# Patient Record
Sex: MALE | Race: WHITE | NOT HISPANIC OR LATINO | Employment: OTHER | URBAN - METROPOLITAN AREA
[De-identification: names, ages, dates, MRNs, and addresses within clinical notes are randomized per-mention and may not be internally consistent; named-entity substitution may affect disease eponyms.]

---

## 2017-06-13 ENCOUNTER — TELEPHONE (OUTPATIENT)
Dept: CARDIOLOGY | Facility: CLINIC | Age: 67
End: 2017-06-13

## 2017-06-13 NOTE — TELEPHONE ENCOUNTER
Called and left a message for patient, no need to hold Eliquis prior to dental procedure but could hold for one day if prefers to per Dr. Ariza's instruction/ CHAUNCEY

## 2017-06-13 NOTE — TELEPHONE ENCOUNTER
Patient is scheduled for a tooth extraction tomorrow.  He asks when he should hold Eliquis.  Please advise. / CHAUNCEY

## 2017-09-14 RX ORDER — APIXABAN 5 MG/1
TABLET, FILM COATED ORAL
Qty: 60 TABLET | Refills: 11 | Status: SHIPPED | OUTPATIENT
Start: 2017-09-14 | End: 2017-09-28 | Stop reason: SDUPTHER

## 2017-09-20 RX ORDER — DIGOXIN 250 MCG
TABLET ORAL
Qty: 30 TABLET | Refills: 0 | Status: SHIPPED | OUTPATIENT
Start: 2017-09-20 | End: 2017-09-28 | Stop reason: SDUPTHER

## 2017-09-20 RX ORDER — SOTALOL HYDROCHLORIDE 80 MG/1
TABLET ORAL
Qty: 60 TABLET | Refills: 0 | Status: SHIPPED | OUTPATIENT
Start: 2017-09-20 | End: 2017-10-02 | Stop reason: SINTOL

## 2017-09-20 RX ORDER — LISINOPRIL 2.5 MG/1
TABLET ORAL
Qty: 30 TABLET | Refills: 0 | Status: SHIPPED | OUTPATIENT
Start: 2017-09-20 | End: 2017-09-28 | Stop reason: SDUPTHER

## 2017-09-28 ENCOUNTER — OFFICE VISIT (OUTPATIENT)
Dept: CARDIOLOGY | Facility: CLINIC | Age: 67
End: 2017-09-28

## 2017-09-28 VITALS
WEIGHT: 198 LBS | SYSTOLIC BLOOD PRESSURE: 134 MMHG | HEART RATE: 86 BPM | DIASTOLIC BLOOD PRESSURE: 92 MMHG | BODY MASS INDEX: 30.01 KG/M2 | HEIGHT: 68 IN

## 2017-09-28 DIAGNOSIS — I48.20 CHRONIC ATRIAL FIBRILLATION (HCC): Primary | ICD-10-CM

## 2017-09-28 DIAGNOSIS — I42.9 CARDIOMYOPATHY (HCC): ICD-10-CM

## 2017-09-28 DIAGNOSIS — I10 HTN (HYPERTENSION) WITH GOAL TO BE DETERMINED: ICD-10-CM

## 2017-09-28 PROCEDURE — 99214 OFFICE O/P EST MOD 30 MIN: CPT | Performed by: INTERNAL MEDICINE

## 2017-09-28 PROCEDURE — 93000 ELECTROCARDIOGRAM COMPLETE: CPT | Performed by: INTERNAL MEDICINE

## 2017-09-28 RX ORDER — DIGOXIN 250 MCG
250 TABLET ORAL
Qty: 90 TABLET | Refills: 3 | Status: SHIPPED | OUTPATIENT
Start: 2017-09-28 | End: 2018-09-23 | Stop reason: SDUPTHER

## 2017-09-28 RX ORDER — SOTALOL HYDROCHLORIDE 80 MG/1
80 TABLET ORAL 2 TIMES DAILY
Qty: 180 TABLET | Refills: 3 | Status: CANCELLED | OUTPATIENT
Start: 2017-09-28

## 2017-09-28 RX ORDER — LISINOPRIL 2.5 MG/1
2.5 TABLET ORAL DAILY
Qty: 90 TABLET | Refills: 0 | Status: SHIPPED | OUTPATIENT
Start: 2017-09-28 | End: 2018-01-04 | Stop reason: SDUPTHER

## 2017-09-28 NOTE — PROGRESS NOTES
Date of Office Visit: 17  Encounter Provider: Monty Ariza MD  Place of Service: [facility] CARDIOLOGY  Patient Name: Jered Morejon  :1950      Chief Complaint   Patient presents with   • Cardiomyopathy   • Atrial Fibrillation     History of Present Illness  HPI Comments: Mr. Morejon is a 66-year-old gentleman with a history of atrial fibrillation.  He said he's had this off and on since he was age 20.  In  he presented to the hospital in atrial fibrillation with shortness of breath.  At that time he had cardiac catheterization that was unremarkable except had a left ventricular ejection fraction of 30%.  His coronaries were normal.  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  In regards to his atrial fibrillation he had a transesophageal echocardiogram and then had cardioversion.  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  He subsequently did well.    He now comes in for follow-up.  He denies chest pain and pressure.  Denies any shortness breath, orthopnea, or PND.  Denies any stroke type symptoms.  Denies any bleeding or blood in his stool or black tarry stools he does notice occasional heart fluttering.  No near-syncope or syncope.  And 40 still drinking 4-5 beers a day.    Cardiomyopathy   Pertinent negatives include no abdominal pain, congestion, diaphoresis, fever, headaches, joint swelling, myalgias, nausea, numbness, rash, vertigo, vomiting or weakness.   Atrial Fibrillation   Symptoms are negative for dizziness and weakness. Past medical history includes atrial fibrillation.         Past Medical History:   Diagnosis Date   • Atrial fibrillation    • Hypertension    • Renal infarct          Past Surgical History:   Procedure Laterality Date   • CARDIAC CATHETERIZATION     • CARDIOVERSION           Current Outpatient Prescriptions on File Prior to Visit   Medication Sig Dispense Refill   • buPROPion XL (WELLBUTRIN XL) 300 MG 24 hr tablet  Take 300 mg by mouth daily.       • fluticasone (FLONASE) 50 MCG/ACT nasal spray 1 spray into each nostril daily.       • sotalol (BETAPACE) 80 MG tablet TAKE ONE TABLET BY MOUTH TWICE DAILY 60 tablet 0   • [DISCONTINUED] digoxin (LANOXIN) 250 MCG tablet TAKE ONE TABLET BY MOUTH ONCE DAILY 30 tablet 0   • [DISCONTINUED] ELIQUIS 5 MG tablet tablet TAKE ONE TABLET BY MOUTH TWICE DAILY 60 tablet 11   • [DISCONTINUED] lisinopril (PRINIVIL,ZESTRIL) 2.5 MG tablet TAKE ONE TABLET BY MOUTH ONCE DAILY 30 tablet 0     No current facility-administered medications on file prior to visit.          Social History     Social History   • Marital status: Single     Spouse name: N/A   • Number of children: N/A   • Years of education: N/A     Occupational History   • Not on file.     Social History Main Topics   • Smoking status: Former Smoker     Packs/day: 2.00     Years: 45.00     Quit date: 1/19/2012   • Smokeless tobacco: Not on file   • Alcohol use Yes      Comment: 3 drinks a day   • Drug use: No   • Sexual activity: Not on file     Other Topics Concern   • Not on file     Social History Narrative       Family History   Problem Relation Age of Onset   • No Known Problems Mother    • No Known Problems Father    • No Known Problems Sister    • No Known Problems Brother    • No Known Problems Maternal Aunt    • No Known Problems Maternal Uncle    • No Known Problems Paternal Aunt    • No Known Problems Paternal Uncle    • No Known Problems Maternal Grandmother    • No Known Problems Maternal Grandfather    • No Known Problems Paternal Grandmother    • No Known Problems Paternal Grandfather          Review of Systems   Constitution: Negative for decreased appetite, diaphoresis, fever, weakness, malaise/fatigue, weight gain and weight loss.   HENT: Negative for congestion, hearing loss, nosebleeds and tinnitus.    Eyes: Negative for blurred vision, double vision, vision loss in left eye, vision loss in right eye and visual  "disturbance.   Cardiovascular:        As noted in HPI   Respiratory:        As noted HPI   Endocrine: Negative for cold intolerance and heat intolerance.   Hematologic/Lymphatic: Negative for bleeding problem. Does not bruise/bleed easily.   Skin: Negative for color change, flushing, itching and rash.   Musculoskeletal: Negative for arthritis, back pain, joint pain, joint swelling, muscle weakness and myalgias.   Gastrointestinal: Negative for bloating, abdominal pain, constipation, diarrhea, dysphagia, heartburn, hematemesis, hematochezia, melena, nausea and vomiting.   Genitourinary: Negative for bladder incontinence, dysuria, frequency, nocturia and urgency.   Neurological: Negative for dizziness, focal weakness, headaches, light-headedness, loss of balance, numbness, paresthesias and vertigo.   Psychiatric/Behavioral: Negative for depression, memory loss and substance abuse.       Procedures      ECG 12 Lead  Date/Time: 9/28/2017 3:30 PM  Performed by: LEISA PENG  Authorized by: LEISA PENG   Comparison: compared with previous ECG   Similar to previous ECG  Rhythm: atrial fibrillation  Rate: normal  Conduction: right bundle branch block  QRS axis: indeterminate                 Objective:    /92 (BP Location: Left arm)  Pulse 86  Ht 68\" (172.7 cm)  Wt 198 lb (89.8 kg)  BMI 30.11 kg/m2       Physical Exam  Physical Exam   Constitutional: He is oriented to person, place, and time. He appears well-developed and well-nourished. No distress.   HENT:   Head: Normocephalic.   Eyes: Conjunctivae are normal. Pupils are equal, round, and reactive to light. No scleral icterus.   Neck: Normal carotid pulses, no hepatojugular reflux and no JVD present. Carotid bruit is not present. No tracheal deviation, no edema and no erythema present. No thyromegaly present.   Cardiovascular: Normal rate, S1 normal, S2 normal, normal heart sounds and intact distal pulses.  An irregularly irregular rhythm present.  No " extrasystoles are present. PMI is not displaced.  Exam reveals no gallop, no distant heart sounds and no friction rub.    No murmur heard.  Pulses:       Carotid pulses are 2+ on the right side, and 2+ on the left side.       Radial pulses are 2+ on the right side, and 2+ on the left side.        Femoral pulses are 2+ on the right side, and 2+ on the left side.       Dorsalis pedis pulses are 2+ on the right side, and 2+ on the left side.        Posterior tibial pulses are 2+ on the right side, and 2+ on the left side.   Pulmonary/Chest: Effort normal and breath sounds normal. No respiratory distress. He has no decreased breath sounds. He has no wheezes. He has no rhonchi. He has no rales. He exhibits no tenderness.   Abdominal: Soft. Bowel sounds are normal. He exhibits no distension and no mass. There is no hepatosplenomegaly. There is no tenderness. There is no rebound and no guarding.   Musculoskeletal: He exhibits no edema, tenderness or deformity.   Neurological: He is alert and oriented to person, place, and time.   Skin: Skin is warm and dry. No rash noted. He is not diaphoretic. No cyanosis or erythema. No pallor. Nails show no clubbing.   Psychiatric: He has a normal mood and affect. His speech is normal and behavior is normal. Judgment and thought content normal.           Assessment:   1. 66-year-old gentleman with chronic atrial fibrillation.  Atrial Fibrillation and Atrial Flutter  Assessment  • The patient has permanent atrial fibrillation  • This is non-valvular in etiology  • The patient's CHADS2-VASc score is 2  • A [id number] score of 2 or more is considered a high risk for a thromboembolic event  • Apixaban prescribed    Plan  • Continue in atrial fibrillation with rate control  • Continue apixaban for antithrombotic therapy, bleeding issues discussed  • Continue beta blocker and digoxin for rate control  He is still drinking a lot which is a big concern We've asked him to cut that back or stop  altogether.  In addition his QTC is prolonged could be from the sotalol he's not staying in sinus rhythm were going to switch him to metoprolol.  We'll continue the same see me in follow-up in a year.     2. Cardiomyopathy. Follow-up echocardiogram in January 2016 left ventricular ejection fraction to return to normal at 57% no significant valvular disease. This most likely was arrhythmia induced. He is New York Heart Association class I continue the same.  3. Hypertension. Blood pressure adequately controlled.  4. Alcohol abuse is described.  4. Remote history of renal infarct he says after cardioversion           Plan:

## 2017-10-02 RX ORDER — SOTALOL HYDROCHLORIDE 80 MG/1
TABLET ORAL
Qty: 180 TABLET | Refills: 3 | OUTPATIENT
Start: 2017-10-02

## 2018-01-04 RX ORDER — LISINOPRIL 2.5 MG/1
2.5 TABLET ORAL DAILY
Qty: 90 TABLET | Refills: 1 | Status: SHIPPED | OUTPATIENT
Start: 2018-01-04 | End: 2018-01-28 | Stop reason: SDUPTHER

## 2018-01-29 RX ORDER — LISINOPRIL 2.5 MG/1
TABLET ORAL
Qty: 90 TABLET | Refills: 1 | Status: SHIPPED | OUTPATIENT
Start: 2018-01-29 | End: 2018-08-01 | Stop reason: SDUPTHER

## 2018-08-02 RX ORDER — LISINOPRIL 2.5 MG/1
TABLET ORAL
Qty: 90 TABLET | Refills: 1 | Status: SHIPPED | OUTPATIENT
Start: 2018-08-02 | End: 2019-03-01 | Stop reason: SDUPTHER

## 2018-09-23 ENCOUNTER — TELEPHONE (OUTPATIENT)
Dept: CARDIOLOGY | Facility: CLINIC | Age: 68
End: 2018-09-23

## 2018-09-23 DIAGNOSIS — I48.91 ATRIAL FIBRILLATION, UNSPECIFIED TYPE (HCC): Primary | ICD-10-CM

## 2018-09-24 RX ORDER — DIGOXIN 250 UG/1
TABLET ORAL
Qty: 90 TABLET | Refills: 0 | Status: SHIPPED | OUTPATIENT
Start: 2018-09-24 | End: 2018-09-24 | Stop reason: SDUPTHER

## 2018-09-24 RX ORDER — DIGOXIN 250 MCG
250 TABLET ORAL DAILY
Qty: 30 TABLET | Refills: 0 | Status: SHIPPED | OUTPATIENT
Start: 2018-09-24 | End: 2018-12-24 | Stop reason: SDUPTHER

## 2018-10-01 RX ORDER — APIXABAN 5 MG/1
TABLET, FILM COATED ORAL
Qty: 60 TABLET | Refills: 0 | Status: SHIPPED | OUTPATIENT
Start: 2018-10-01 | End: 2019-02-04 | Stop reason: HOSPADM

## 2018-10-09 ENCOUNTER — TELEPHONE (OUTPATIENT)
Dept: CARDIOLOGY | Facility: CLINIC | Age: 68
End: 2018-10-09

## 2018-10-23 ENCOUNTER — LAB (OUTPATIENT)
Dept: LAB | Facility: HOSPITAL | Age: 68
End: 2018-10-23

## 2018-10-23 DIAGNOSIS — I48.91 ATRIAL FIBRILLATION, UNSPECIFIED TYPE (HCC): ICD-10-CM

## 2018-10-23 LAB — DIGOXIN SERPL-MCNC: 1.2 NG/ML (ref 0.6–1.2)

## 2018-10-23 PROCEDURE — 80162 ASSAY OF DIGOXIN TOTAL: CPT

## 2018-10-23 PROCEDURE — 36415 COLL VENOUS BLD VENIPUNCTURE: CPT

## 2018-10-26 ENCOUNTER — OFFICE VISIT (OUTPATIENT)
Dept: CARDIOLOGY | Facility: CLINIC | Age: 68
End: 2018-10-26

## 2018-10-26 VITALS
BODY MASS INDEX: 30.31 KG/M2 | WEIGHT: 200 LBS | SYSTOLIC BLOOD PRESSURE: 132 MMHG | HEART RATE: 77 BPM | DIASTOLIC BLOOD PRESSURE: 76 MMHG | HEIGHT: 68 IN

## 2018-10-26 DIAGNOSIS — I42.8 OTHER CARDIOMYOPATHY (HCC): ICD-10-CM

## 2018-10-26 DIAGNOSIS — I10 HTN (HYPERTENSION) WITH GOAL TO BE DETERMINED: ICD-10-CM

## 2018-10-26 DIAGNOSIS — I48.20 CHRONIC ATRIAL FIBRILLATION (HCC): Primary | ICD-10-CM

## 2018-10-26 PROCEDURE — 99214 OFFICE O/P EST MOD 30 MIN: CPT | Performed by: INTERNAL MEDICINE

## 2018-10-26 PROCEDURE — 93000 ELECTROCARDIOGRAM COMPLETE: CPT | Performed by: INTERNAL MEDICINE

## 2018-10-26 NOTE — PROGRESS NOTES
Date of Office Visit: 10/26/18  Encounter Provider: Monty Ariza MD  Place of Service: [facility] CARDIOLOGY  Patient Name: Jered Morejon  :1950  Referral Provider:No ref. provider found      Chief Complaint   Patient presents with   • Atrial Fibrillation   • Hypertension     History of Present Illness  Mr. Morejon is a 67-year-old gentleman with a history of atrial fibrillation.  He said he's had this off and on since he was age 20.  In  he presented to the hospital in atrial fibrillation with shortness of breath.  At that time he had cardiac catheterization that was unremarkable except had a left ventricular ejection fraction of 30%.  His coronaries were normal.  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  In regards to his atrial fibrillation he had a transesophageal echocardiogram and then had cardioversion.  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  He subsequently did well.    He now comes in for follow-up. The patient denies chest pain, pressure and heaviness. No shortness of breath, othopnea or PND. No palpitations, near syncope or syncope. No stroke type symptoms like paralysis, paresthesia, abrupt vision loss and dysarthria. No bleeding like blood in the stool or dark stools.  Comes in for follow-up and unfortunately is been having this pain in his back.  He's really complaining of this muscle weakness in his legs and also some tingling in his legs and is convinced it's from the eliquis.  He still drinking about 6 beers a day.      Cardiomyopathy   Pertinent negatives include no abdominal pain, congestion, diaphoresis, fever, headaches, joint swelling, myalgias, nausea, numbness, rash, vertigo, vomiting or weakness.   Atrial Fibrillation   Symptoms are negative for dizziness and weakness. Past medical history includes atrial fibrillation.         Past Medical History:   Diagnosis Date   • Atrial fibrillation (CMS/HCC)    • Hypertension    •  Renal infarct (CMS/HCC)          Past Surgical History:   Procedure Laterality Date   • CARDIAC CATHETERIZATION     • CARDIOVERSION           Current Outpatient Prescriptions on File Prior to Visit   Medication Sig Dispense Refill   • buPROPion XL (WELLBUTRIN XL) 300 MG 24 hr tablet Take 300 mg by mouth daily.       • digoxin (DIGOX) 250 MCG tablet Take 1 tablet by mouth Daily. 30 tablet 0   • ELIQUIS 5 MG tablet tablet TAKE 1 TABLET BY MOUTH TWICE DAILY 60 tablet 0   • fluticasone (FLONASE) 50 MCG/ACT nasal spray 1 spray into each nostril daily.       • lisinopril (PRINIVIL,ZESTRIL) 2.5 MG tablet TAKE 1 TABLET BY MOUTH EVERY DAY 90 tablet 1   • metoprolol tartrate (LOPRESSOR) 25 MG tablet Take 1 tablet by mouth 2 (Two) Times a Day. 180 tablet 3     No current facility-administered medications on file prior to visit.          Social History     Social History   • Marital status: Single     Spouse name: N/A   • Number of children: N/A   • Years of education: N/A     Occupational History   • Not on file.     Social History Main Topics   • Smoking status: Former Smoker     Packs/day: 2.00     Years: 45.00     Quit date: 1/19/2012   • Smokeless tobacco: Not on file   • Alcohol use Yes      Comment: 3 drinks a day   • Drug use: No   • Sexual activity: Not on file     Other Topics Concern   • Not on file     Social History Narrative   • No narrative on file       Family History   Problem Relation Age of Onset   • No Known Problems Mother    • No Known Problems Father    • No Known Problems Sister    • No Known Problems Brother    • No Known Problems Maternal Aunt    • No Known Problems Maternal Uncle    • No Known Problems Paternal Aunt    • No Known Problems Paternal Uncle    • No Known Problems Maternal Grandmother    • No Known Problems Maternal Grandfather    • No Known Problems Paternal Grandmother    • No Known Problems Paternal Grandfather          Review of Systems   Constitution: Negative for decreased appetite,  "diaphoresis, fever, weakness, malaise/fatigue, weight gain and weight loss.   HENT: Negative for congestion, hearing loss, nosebleeds and tinnitus.    Eyes: Negative for blurred vision, double vision, vision loss in left eye, vision loss in right eye and visual disturbance.   Cardiovascular:        As noted in HPI   Respiratory:        As noted HPI   Endocrine: Negative for cold intolerance and heat intolerance.   Hematologic/Lymphatic: Negative for bleeding problem. Does not bruise/bleed easily.   Skin: Negative for color change, flushing, itching and rash.   Musculoskeletal: Positive for back pain and muscle weakness. Negative for arthritis, joint pain, joint swelling and myalgias.   Gastrointestinal: Negative for bloating, abdominal pain, constipation, diarrhea, dysphagia, heartburn, hematemesis, hematochezia, melena, nausea and vomiting.   Genitourinary: Negative for bladder incontinence, dysuria, frequency, nocturia and urgency.   Neurological: Positive for paresthesias. Negative for dizziness, focal weakness, headaches, light-headedness, loss of balance, numbness and vertigo.   Psychiatric/Behavioral: Negative for depression, memory loss and substance abuse.       Procedures      ECG 12 Lead  Date/Time: 10/26/2018 2:42 PM  Performed by: LEISA PENG  Authorized by: LEISA PENG   Comparison: compared with previous ECG   Similar to previous ECG  Rate: normal  Conduction: right bundle branch block  QRS axis: normal                  Objective:    /76 (BP Location: Right arm, Patient Position: Sitting)   Pulse 77   Ht 172.7 cm (68\")   Wt 90.7 kg (200 lb)   BMI 30.41 kg/m²        Physical Exam  Physical Exam   Constitutional: He is oriented to person, place, and time. He appears well-developed and well-nourished. No distress.   HENT:   Head: Normocephalic.   Eyes: Pupils are equal, round, and reactive to light. Conjunctivae are normal. No scleral icterus.   Neck: Normal carotid pulses, no " hepatojugular reflux and no JVD present. Carotid bruit is not present. No tracheal deviation, no edema and no erythema present. No thyromegaly present.   Cardiovascular: Normal rate, S1 normal, S2 normal, normal heart sounds and intact distal pulses.  An irregularly irregular rhythm present.  No extrasystoles are present. PMI is not displaced.  Exam reveals no gallop, no distant heart sounds and no friction rub.    No murmur heard.  Pulses:       Carotid pulses are 2+ on the right side, and 2+ on the left side.       Radial pulses are 2+ on the right side, and 2+ on the left side.        Femoral pulses are 2+ on the right side, and 2+ on the left side.       Dorsalis pedis pulses are 2+ on the right side, and 2+ on the left side.        Posterior tibial pulses are 2+ on the right side, and 2+ on the left side.   Pulmonary/Chest: Effort normal and breath sounds normal. No respiratory distress. He has no decreased breath sounds. He has no wheezes. He has no rhonchi. He has no rales. He exhibits no tenderness.   Abdominal: Soft. Bowel sounds are normal. He exhibits no distension and no mass. There is no hepatosplenomegaly. There is no tenderness. There is no rebound and no guarding.   Musculoskeletal: He exhibits no edema, tenderness or deformity.   Neurological: He is alert and oriented to person, place, and time.   Skin: Skin is warm and dry. No rash noted. He is not diaphoretic. No cyanosis or erythema. No pallor. Nails show no clubbing.   Psychiatric: He has a normal mood and affect. His speech is normal and behavior is normal. Judgment and thought content normal.           Assessment:   1. 67-year-old gentleman with chronic atrial fibrillation.  Atrial Fibrillation and Atrial Flutter  Assessment  • The patient has permanent atrial fibrillation  • This is non-valvular in etiology  • The patient's CHADS2-VASc score is 2  • A [id number] score of 2 or more is considered a high risk for a thromboembolic event  •  Apixaban prescribed    Plan  • Continue in atrial fibrillation with rate control  • Continue apixaban for antithrombotic therapy, bleeding issues discussed  • Continue beta blocker and digoxin for rate control  He's convinced his side effects are from eliquis I'm convinced there from his back.  He is going to stop the eliquis start Xarelto 20 mg a day on Sunday he's to call us in the next week or 2 let us know his symptoms are if they've not resolved he should go back on the eliquis.  There is a chance his symptoms are related to his alcohol.  He will see our nurse practitioner in 4 months we'll see me in a year and call if problems.     2. Cardiomyopathy. Follow-up echocardiogram in January 2016 left ventricular ejection fraction to return to normal at 57% no significant valvular disease. This most likely was arrhythmia induced. He is New York Heart Association class I continue the same.  3. Hypertension. Blood pressure adequately controlled.  4. Alcohol abuse as described.  4. 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

## 2018-10-29 RX ORDER — APIXABAN 5 MG/1
TABLET, FILM COATED ORAL
Qty: 60 TABLET | Refills: 0 | OUTPATIENT
Start: 2018-10-29

## 2018-12-12 ENCOUNTER — TELEPHONE (OUTPATIENT)
Dept: CARDIOLOGY | Facility: CLINIC | Age: 68
End: 2018-12-12

## 2018-12-24 RX ORDER — DIGOXIN 250 UG/1
TABLET ORAL
Qty: 90 TABLET | Refills: 0 | Status: SHIPPED | OUTPATIENT
Start: 2018-12-24 | End: 2019-04-01 | Stop reason: SDUPTHER

## 2019-03-01 ENCOUNTER — OFFICE VISIT (OUTPATIENT)
Dept: CARDIOLOGY | Facility: CLINIC | Age: 69
End: 2019-03-01

## 2019-03-01 VITALS
DIASTOLIC BLOOD PRESSURE: 80 MMHG | HEART RATE: 59 BPM | SYSTOLIC BLOOD PRESSURE: 140 MMHG | BODY MASS INDEX: 31.08 KG/M2 | HEIGHT: 67 IN | WEIGHT: 198 LBS

## 2019-03-01 DIAGNOSIS — N52.9 ERECTILE DYSFUNCTION, UNSPECIFIED ERECTILE DYSFUNCTION TYPE: ICD-10-CM

## 2019-03-01 DIAGNOSIS — Z86.79 HISTORY OF CARDIOMYOPATHY: ICD-10-CM

## 2019-03-01 DIAGNOSIS — Z87.891 HISTORY OF TOBACCO ABUSE: ICD-10-CM

## 2019-03-01 DIAGNOSIS — I42.8 OTHER CARDIOMYOPATHY (HCC): ICD-10-CM

## 2019-03-01 DIAGNOSIS — I10 HTN (HYPERTENSION) WITH GOAL TO BE DETERMINED: ICD-10-CM

## 2019-03-01 DIAGNOSIS — I48.20 CHRONIC ATRIAL FIBRILLATION (HCC): Primary | ICD-10-CM

## 2019-03-01 DIAGNOSIS — Z12.2 ENCOUNTER FOR SCREENING FOR LUNG CANCER: ICD-10-CM

## 2019-03-01 DIAGNOSIS — Z87.891 HISTORY OF NICOTINE DEPENDENCE: ICD-10-CM

## 2019-03-01 PROBLEM — I42.9 MYOCARDIOPATHY: Status: ACTIVE | Noted: 2019-03-01

## 2019-03-01 PROCEDURE — 93000 ELECTROCARDIOGRAM COMPLETE: CPT | Performed by: NURSE PRACTITIONER

## 2019-03-01 PROCEDURE — 99214 OFFICE O/P EST MOD 30 MIN: CPT | Performed by: NURSE PRACTITIONER

## 2019-03-01 RX ORDER — NEBIVOLOL 5 MG/1
5 TABLET ORAL DAILY
Qty: 30 TABLET | Refills: 11 | Status: SHIPPED | OUTPATIENT
Start: 2019-03-01 | End: 2020-03-23

## 2019-03-01 RX ORDER — LISINOPRIL 2.5 MG/1
2.5 TABLET ORAL DAILY
Qty: 90 TABLET | Refills: 3 | Status: SHIPPED | OUTPATIENT
Start: 2019-03-01 | End: 2020-01-24

## 2019-03-01 NOTE — PROGRESS NOTES
Date of Office Visit: 2019  Encounter Provider: TALHA Garsia  Place of Service: [facility] CARDIOLOGY  Patient Name: Jered Morejon  :1950    Chief Complaint   Patient presents with   • Atrial Fibrillation   • Follow-up   :     HPI: Jered Morejon is a 68 y.o. male is a patient of Dr. Ariza.  I am seeing him today for the first time and have reviewed his record.    He has a history of atrial fibrillation.  He has had irregular heart rhythm since the age of 20. He quit smoking in approximately .    In , he presented in atrial fibrillation with shortness of breath.  He had a cardiac catheterization which was unremarkable despite decreased left ventricular systolic function with an ejection fraction of 30%.  The coronary arteries were normal.  There was some question at the cardiomyopathy could be alcohol induced.  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   Follow-up echocardiogram in 2016 showed normal left ventricular systolic function.  The cardiomyopathy was likely arrhythmia induced.  In 2017 he was changed from sotalol to metoprolol tartrate due to QTC prolongation and recurrent A. fib.  He was treated with Eliquis and then complained of muscle weakness and some tingling in the legs which he was convinced was from that.  He was switched to Xarelto 20 mg as he continued to be in atrial fibrillation.    Patient presents today for four-month follow-up.  He believes that the muscle weakness has improved since being on Xarelto.  His main complaint today is erectile dysfunction and having decreased stamina with sexual activity.  He questions if his medication could be contributing to this.  He denies chest pain tightness pressure, palpitation, shortness of breath, near-syncope, or syncope.  He checks his blood pressure at home sparingly which is usually  "130s systolic.  He does have occasional 140 values.  He does not perform any structured exercise.  He continues to drink 3 drinks daily.  He tells me that he did not have the erectile dysfunction when he was on sotalol question can go back to that.  He has never had abdominal aortic aneurysm screening or lung cancer screening with history of tobacco use.      No Known Allergies    Past Medical History:   Diagnosis Date   • Atrial fibrillation (CMS/HCC)    • Hypertension    • Renal infarct (CMS/HCC)        Past Surgical History:   Procedure Laterality Date   • CARDIAC CATHETERIZATION     • CARDIOVERSION           Family and social history reviewed.     Review of Systems   Musculoskeletal: Positive for muscle weakness.   Genitourinary: Positive for decreased libido.        Erectile dysfunction     All other systems were reviewed and are negative          Objective:     Vitals:    03/01/19 1421   BP: 140/80   BP Location: Left arm   Patient Position: Sitting   Pulse: 59   Weight: 89.8 kg (198 lb)   Height: 170.2 cm (67\")     Body mass index is 31.01 kg/m².    PHYSICAL EXAM:  Physical Exam   Constitutional: He is oriented to person, place, and time. He appears well-developed and well-nourished. No distress.   HENT:   Head: Normocephalic.   Eyes: Conjunctivae are normal.   Neck: Normal range of motion. No JVD present.   Cardiovascular: Normal rate, normal heart sounds and intact distal pulses. An irregular rhythm present.   No murmur heard.  Pulses:       Carotid pulses are 2+ on the right side, and 2+ on the left side.       Radial pulses are 2+ on the right side, and 2+ on the left side.        Posterior tibial pulses are 2+ on the right side, and 2+ on the left side.   Pulmonary/Chest: Effort normal and breath sounds normal. No respiratory distress. He has no wheezes. He has no rhonchi. He has no rales. He exhibits no tenderness.   Abdominal: Soft. Bowel sounds are normal. He exhibits no distension.   Musculoskeletal: " Normal range of motion. He exhibits no edema.   Neurological: He is alert and oriented to person, place, and time.   Skin: Skin is warm, dry and intact. No rash noted. He is not diaphoretic. No cyanosis.   Psychiatric: He has a normal mood and affect. His behavior is normal. Judgment and thought content normal.         ECG 12 Lead  Date/Time: 3/1/2019 2:32 PM  Performed by: Carlie Smith APRN  Authorized by: Carlie Smith APRN   Comparison: compared with previous ECG from 10/26/2018  Rhythm: atrial fibrillation  Conduction: right bundle branch block  QRS axis: left    Clinical impression: abnormal EKG            Current Outpatient Medications   Medication Sig Dispense Refill   • buPROPion XL (WELLBUTRIN XL) 300 MG 24 hr tablet Take 300 mg by mouth daily.       • DIGOX 250 MCG tablet TAKE 1 TABLET BY MOUTH EVERY DAY 90 tablet 0   • fluticasone (FLONASE) 50 MCG/ACT nasal spray 1 spray into each nostril daily.       • lisinopril (PRINIVIL,ZESTRIL) 2.5 MG tablet Take 1 tablet by mouth Daily. 90 tablet 3   • rivaroxaban (XARELTO) 20 MG tablet Take 1 tablet by mouth Daily. 30 tablet 3   • nebivolol (BYSTOLIC) 5 MG tablet Take 1 tablet by mouth Daily. 30 tablet 11     No current facility-administered medications for this visit.      Assessment:       Diagnosis Plan   1. Chronic atrial fibrillation (CMS/HCC)  ECG 12 Lead   2. HTN (hypertension) with goal to be determined     3. History of cardiomyopathy     4. Other cardiomyopathy (CMS/HCC)     5. Erectile dysfunction, unspecified erectile dysfunction type     6. History of tobacco abuse  US AAA Screen Limited    CT chest low dose wo   7. Encounter for screening for lung cancer     8. History of nicotine dependence          Orders Placed This Encounter   Procedures   • US AAA Screen Limited     Standing Status:   Future     Standing Expiration Date:   3/1/2020     Order Specific Question:   Is the patient a male between 65-75 years old and have smoked at least 100  cigarettes in their lifetime OR a male or female Medicare patient who has a family history of abdominal aoritc aneurysm?     Answer:   Yes     Order Specific Question:   Reason for Exam:     Answer:   history of smoking. Quit in 2012   • CT chest low dose wo     Standing Status:   Future     Standing Expiration Date:   3/1/2020     Order Specific Question:   The patient is age 55-77:     Answer:   68     Order Specific Question:   The patient is a current smoker?     Answer:   No     Order Specific Question:   The patient is a former smoker who has quit within the last 15 years?     Answer:   Yes     Order Specific Question:   The number of years since quitting smoking.     Answer:   7     Order Specific Question:   The patient has a smoking history of 30 pack-years or greater:     Answer:   No     Order Specific Question:   Has the Patient had a Chest CT scan within the past 12 months?     Answer:   No     Order Specific Question:   Does the patient have any clinical signs/symptoms of lung cancer?     Answer:   No     Order Specific Question:   The patient was engaged in shared decision-making for this test:     Answer:   Yes   • ECG 12 Lead     This order was created via procedure documentation         Plan:         1.  Chronic atrial fibrillation with history of cardioversion intolerant to Eliquis doing better on Xarelto 20 mg.  He failed sotalol due to QTC prolongation and recurrent atrial fibrillation.  In September 2017 he was switched from sotalol to metoprolol tartrate.  His main complaint today is erectile dysfunction and he is adamant to come off any medication that could be contributing to that.  We agreed to start Bystolic 5 mg daily. He will follow up in 4 weeks with me.  Atrial Fibrillation and Atrial Flutter  Assessment  • The patient has permanent atrial fibrillation  • This is non-valvular in etiology  • The patient's CHADS2-VASc score is 2  • A ZIB5EN4-ECRb score of 2 or more is considered a high  risk for a thromboembolic event  • Apixaban prescribed    Plan  • Continue in atrial fibrillation with rate control  • Continue apixaban for antithrombotic therapy, bleeding issues discussed  • Continue beta blocker and digoxin for rate control  • Dig level normal in 10/2018      2.  History of cardiomyopathy with normalization of ejection fraction by January 2016 with no significant valvular disease.  Felt to be arrhythmia induced.  He is [place] heart association class I continue the same  3.  Hypertension slightly elevated today normally 130 s systolic. He is to follow more closely at home  4.  Alcohol abuse as described above  5.  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 Normal coronary arteries on cath in 2013   7.Erectile dysfunction will see if Bystolic in place of metoprolol tartrate improve him symptoms. Hopefully, we do not have issues with rapid a fib.  8. History of smoking he has never had a low dose CT screening and request that we do that. Will also do AAA one time screening.      Follow up in one month or call with concerns      It has been a pleasure to participate in this patient's care.      Thank you,  TALHA Garsia      **Toshia Disclaimer:**  Much of this encounter note is an electronic transcription/translation of spoken language to printed text. The electronic translation of spoken language may permit erroneous, or at times, nonsensical words or phrases to be inadvertently transcribed. Although I have reviewed the note for such errors, some may still exist.

## 2019-04-01 RX ORDER — DIGOXIN 250 MCG
250 TABLET ORAL DAILY
Qty: 90 TABLET | Refills: 0 | Status: SHIPPED | OUTPATIENT
Start: 2019-04-01 | End: 2019-05-11 | Stop reason: SDUPTHER

## 2019-04-05 ENCOUNTER — OFFICE VISIT (OUTPATIENT)
Dept: CARDIOLOGY | Facility: CLINIC | Age: 69
End: 2019-04-05

## 2019-04-05 VITALS
SYSTOLIC BLOOD PRESSURE: 132 MMHG | WEIGHT: 196 LBS | HEIGHT: 68 IN | DIASTOLIC BLOOD PRESSURE: 76 MMHG | BODY MASS INDEX: 29.7 KG/M2 | HEART RATE: 73 BPM

## 2019-04-05 DIAGNOSIS — I48.20 CHRONIC ATRIAL FIBRILLATION (HCC): Primary | ICD-10-CM

## 2019-04-05 DIAGNOSIS — N52.9 ERECTILE DYSFUNCTION, UNSPECIFIED ERECTILE DYSFUNCTION TYPE: ICD-10-CM

## 2019-04-05 DIAGNOSIS — Z12.2 ENCOUNTER FOR SCREENING FOR LUNG CANCER: ICD-10-CM

## 2019-04-05 DIAGNOSIS — Z09 FOLLOW-UP EXAMINATION: ICD-10-CM

## 2019-04-05 DIAGNOSIS — I10 HTN (HYPERTENSION) WITH GOAL TO BE DETERMINED: ICD-10-CM

## 2019-04-05 PROCEDURE — 93000 ELECTROCARDIOGRAM COMPLETE: CPT | Performed by: NURSE PRACTITIONER

## 2019-04-05 PROCEDURE — 99213 OFFICE O/P EST LOW 20 MIN: CPT | Performed by: NURSE PRACTITIONER

## 2019-04-05 NOTE — PROGRESS NOTES
Date of Office Visit: 2019  Encounter Provider: TALHA Garsia  Place of Service: [facility] CARDIOLOGY  Patient Name: Jered Morejon  :1950    Chief Complaint   Patient presents with   • Atrial Fibrillation   • Follow-up   :     HPI: Jered Morejon is a 68 y.o. male is a patient of Dr. Ariza.  I am seeing him today and have reviewed his record.    He has a history of atrial fibrillation.  He has had irregular heart rhythm since the age of 20. He quit smoking in approximately .     In , he presented in atrial fibrillation with shortness of breath.  He had a cardiac catheterization which was unremarkable despite decreased left ventricular systolic function with an ejection fraction of 30%.  The coronary arteries were normal.  There was some question at the cardiomyopathy could be alcohol induced.  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   Follow-up echocardiogram in 2016 showed normal left ventricular systolic function.  The cardiomyopathy was likely arrhythmia induced.  In 2017 he was changed from sotalol to metoprolol tartrate due to QTC prolongation and recurrent A. fib.  He was treated with Eliquis and then complained of muscle weakness and some tingling in the legs which he was convinced was from that.  He was switched to Xarelto 20 mg as he continued to be in atrial fibrillation.    In 2019 he presented for 4-month follow-up.  His muscle weakness had improved on Xarelto.  He complained of erectile dysfunction and decreased stamina so we took him off metoprolol and switch him to Bystolic 5 mg daily.    Patient presents today for one-month follow-up.  His heart rate is adequately controlled.  He believes that his erectile dysfunction is slightly improved.  He denies chest pain tightness pressure, palpitation, shortness of breath, edema,  "lightheadedness, fatigue.  He was to have a screening for abdominal aortic aneurysm as well as low-dose CT for history of smoking however when they called to schedule these it did not work with his schedule.      No Known Allergies    Past Medical History:   Diagnosis Date   • Atrial fibrillation (CMS/HCC)    • Hypertension    • Renal infarct (CMS/HCC)        Past Surgical History:   Procedure Laterality Date   • CARDIAC CATHETERIZATION     • CARDIOVERSION           Family and social history reviewed.     ROS  All other systems were reviewed and are negative          Objective:     Vitals:    04/05/19 1459   BP: 132/76   BP Location: Left arm   Patient Position: Sitting   Pulse: 73   Weight: 88.9 kg (196 lb)   Height: 172.7 cm (68\")     Body mass index is 29.8 kg/m².    PHYSICAL EXAM:  Physical Exam   Constitutional: He is oriented to person, place, and time. He appears well-developed and well-nourished. No distress.   HENT:   Head: Normocephalic.   Eyes: Conjunctivae are normal.   Neck: Normal range of motion. No JVD present.   Cardiovascular: Normal rate, normal heart sounds and intact distal pulses. An irregular rhythm present.   No murmur heard.  Pulses:       Carotid pulses are 2+ on the right side, and 2+ on the left side.       Radial pulses are 2+ on the right side, and 2+ on the left side.        Posterior tibial pulses are 2+ on the right side, and 2+ on the left side.   Pulmonary/Chest: Effort normal and breath sounds normal. No respiratory distress. He has no wheezes. He has no rhonchi. He has no rales. He exhibits no tenderness.   Abdominal: Soft. Bowel sounds are normal. He exhibits no distension.   Musculoskeletal: Normal range of motion. He exhibits no edema.   Neurological: He is alert and oriented to person, place, and time.   Skin: Skin is warm, dry and intact. No rash noted. He is not diaphoretic. No cyanosis.   Psychiatric: He has a normal mood and affect. His behavior is normal. Judgment and " thought content normal.         ECG 12 Lead  Date/Time: 4/5/2019 4:55 PM  Performed by: Carlie Smith APRN  Authorized by: Carlie Smith APRN   Comparison: compared with previous ECG from 3/1/2019  Similar to previous ECG  Rhythm: atrial fibrillation  Rate: normal  Conduction: right bundle branch block  QRS axis: left    Clinical impression: abnormal EKG            Current Outpatient Medications   Medication Sig Dispense Refill   • buPROPion XL (WELLBUTRIN XL) 300 MG 24 hr tablet Take 300 mg by mouth daily.       • digoxin (DIGOX) 250 MCG tablet Take 1 tablet by mouth Daily. 90 tablet 0   • fluticasone (FLONASE) 50 MCG/ACT nasal spray 1 spray into each nostril daily.       • lisinopril (PRINIVIL,ZESTRIL) 2.5 MG tablet Take 1 tablet by mouth Daily. 90 tablet 3   • nebivolol (BYSTOLIC) 5 MG tablet Take 1 tablet by mouth Daily. 30 tablet 11   • rivaroxaban (XARELTO) 20 MG tablet Take 1 tablet by mouth Daily. 30 tablet 3     No current facility-administered medications for this visit.      Assessment:       Diagnosis Plan   1. Chronic atrial fibrillation (CMS/HCC)     2. HTN (hypertension) with goal to be determined     3. Erectile dysfunction, unspecified erectile dysfunction type     4. Encounter for screening for lung cancer          No orders of the defined types were placed in this encounter.        Plan:         2.  History of cardiomyopathy with normalization of ejection fraction by January 2016 with no significant valvular disease.  Felt to be arrhythmia induced.  He is [place] heart association class I continue the same  3.  Hypertension stable today continue the same  4.  Alcohol abuse as described above  5.  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 Normal coronary arteries on cath in 2013   7.Erectile dysfunction-this has improved since stopping metoprolol tartrate and starting bystolic 5 mg daily  8. History of smoking he has never had a low dose CT screening and request that we do that.  Reminded him that he can call to have this scheduled as well as AAA one time screening.      Follow up in one year call with issues.             It has been a pleasure to participate in this patient's care.      Thank you,  TALHA Garsia      **Toshia Disclaimer:**  Much of this encounter note is an electronic transcription/translation of spoken language to printed text. The electronic translation of spoken language may permit erroneous, or at times, nonsensical words or phrases to be inadvertently transcribed. Although I have reviewed the note for such errors, some may still exist.

## 2019-04-19 ENCOUNTER — TELEPHONE (OUTPATIENT)
Dept: CARDIOLOGY | Facility: CLINIC | Age: 69
End: 2019-04-19

## 2019-05-13 RX ORDER — DIGOXIN 250 UG/1
TABLET ORAL
Qty: 90 TABLET | Refills: 3 | Status: SHIPPED | OUTPATIENT
Start: 2019-05-13 | End: 2020-07-21

## 2019-07-02 ENCOUNTER — TELEPHONE (OUTPATIENT)
Dept: CARDIOLOGY | Facility: CLINIC | Age: 69
End: 2019-07-02

## 2019-07-02 NOTE — TELEPHONE ENCOUNTER
Called and s/w pt. Re: overdue CT and AAA one-time screening and getting them done. He stated he had not done them yet, but wrote down the names and stated he would call office as soon as they were sx and/or completed.  /Cami

## 2019-08-29 ENCOUNTER — TELEPHONE (OUTPATIENT)
Dept: CARDIOLOGY | Facility: CLINIC | Age: 69
End: 2019-08-29

## 2019-11-27 ENCOUNTER — TELEPHONE (OUTPATIENT)
Dept: CARDIOLOGY | Facility: CLINIC | Age: 69
End: 2019-11-27

## 2019-11-27 NOTE — TELEPHONE ENCOUNTER
Lab slip signed by Dr Ariza.   Faxed to Dr Alston's office.   Copy of lab slip /fax confirmation will be scanned into pt's chart.   Anirudh MARLEY

## 2019-11-27 NOTE — TELEPHONE ENCOUNTER
11/27/19@ 1:18PM   Pt called wanting to go to change Lul back to Eliquis.   He states he has done this once before. ( noted clinic note 10/16/18)   He states he is having issues with his gums hurting.   Please Advise   Pt's call back # [phone]   Thanks Anirudh MARLEY

## 2019-11-27 NOTE — TELEPHONE ENCOUNTER
I spoke with pt let him know he needs labs to check renal function then once labs are reviewed he will decide if ok to switch back to Eliquis.   Pt request we send lab slip to his PCP to have them drawn there.   Juan Cyr MD PCP - General Family Medicine       Phone: [phone]; Fax: [phone]

## 2020-01-24 RX ORDER — LISINOPRIL 2.5 MG/1
2.5 TABLET ORAL DAILY
Qty: 90 TABLET | Refills: 0 | Status: SHIPPED | OUTPATIENT
Start: 2020-01-24 | End: 2020-04-22

## 2020-02-18 RX ORDER — RIVAROXABAN 20 MG/1
TABLET, FILM COATED ORAL
Qty: 30 TABLET | Refills: 1 | Status: SHIPPED | OUTPATIENT
Start: 2020-02-18 | End: 2020-04-17

## 2020-03-23 RX ORDER — NEBIVOLOL HYDROCHLORIDE 5 MG/1
TABLET ORAL
Qty: 30 TABLET | Refills: 11 | Status: SHIPPED | OUTPATIENT
Start: 2020-03-23 | End: 2021-03-29

## 2020-03-26 ENCOUNTER — TELEPHONE (OUTPATIENT)
Dept: CARDIOLOGY | Facility: CLINIC | Age: 70
End: 2020-03-26

## 2020-03-26 NOTE — TELEPHONE ENCOUNTER
S/w patient offered telehealth visit. He has been feeling well no new cardiac complaints. He prefers to be rescheduled in 3 months with MI or AL

## 2020-04-17 RX ORDER — RIVAROXABAN 20 MG/1
TABLET, FILM COATED ORAL
Qty: 30 TABLET | Refills: 1 | Status: SHIPPED | OUTPATIENT
Start: 2020-04-17 | End: 2020-06-26

## 2020-04-22 RX ORDER — LISINOPRIL 2.5 MG/1
2.5 TABLET ORAL DAILY
Qty: 90 TABLET | Refills: 0 | Status: SHIPPED | OUTPATIENT
Start: 2020-04-22 | End: 2020-07-21

## 2020-04-22 NOTE — TELEPHONE ENCOUNTER
Per the cancel notes for the last appt that was scheduled we are to schedule in 2-3 months. He is on the recall list.

## 2020-04-22 NOTE — TELEPHONE ENCOUNTER
I have approved refill. It's ok or him to be rescheduled within 3 months as I did not give any refills past 90 days

## 2020-06-26 RX ORDER — RIVAROXABAN 20 MG/1
TABLET, FILM COATED ORAL
Qty: 90 TABLET | Refills: 1 | Status: SHIPPED | OUTPATIENT
Start: 2020-06-26 | End: 2020-12-24

## 2020-07-21 RX ORDER — LISINOPRIL 2.5 MG/1
2.5 TABLET ORAL DAILY
Qty: 90 TABLET | Refills: 0 | Status: SHIPPED | OUTPATIENT
Start: 2020-07-21 | End: 2020-10-19

## 2020-07-21 RX ORDER — DIGOXIN 250 MCG
TABLET ORAL
Qty: 90 TABLET | Refills: 0 | Status: SHIPPED | OUTPATIENT
Start: 2020-07-21 | End: 2020-10-19

## 2020-10-19 RX ORDER — LISINOPRIL 2.5 MG/1
2.5 TABLET ORAL DAILY
Qty: 90 TABLET | Refills: 0 | Status: SHIPPED | OUTPATIENT
Start: 2020-10-19 | End: 2021-02-01

## 2020-10-19 RX ORDER — DIGOXIN 250 MCG
TABLET ORAL
Qty: 90 TABLET | Refills: 0 | Status: SHIPPED | OUTPATIENT
Start: 2020-10-19 | End: 2021-02-01

## 2020-12-24 RX ORDER — RIVAROXABAN 20 MG/1
TABLET, FILM COATED ORAL
Qty: 90 TABLET | Refills: 0 | Status: SHIPPED | OUTPATIENT
Start: 2020-12-24 | End: 2021-03-29

## 2021-02-01 RX ORDER — LISINOPRIL 2.5 MG/1
2.5 TABLET ORAL DAILY
Qty: 90 TABLET | Refills: 0 | Status: SHIPPED | OUTPATIENT
Start: 2021-02-01 | End: 2021-04-26

## 2021-02-01 RX ORDER — DIGOXIN 250 MCG
TABLET ORAL
Qty: 90 TABLET | Refills: 0 | Status: SHIPPED | OUTPATIENT
Start: 2021-02-01 | End: 2021-05-03

## 2021-03-29 RX ORDER — NEBIVOLOL HYDROCHLORIDE 5 MG/1
TABLET ORAL
Qty: 30 TABLET | Refills: 11 | Status: SHIPPED | OUTPATIENT
Start: 2021-03-29 | End: 2021-10-05 | Stop reason: SDUPTHER

## 2021-03-29 RX ORDER — RIVAROXABAN 20 MG/1
TABLET, FILM COATED ORAL
Qty: 90 TABLET | Refills: 2 | Status: SHIPPED | OUTPATIENT
Start: 2021-03-29 | End: 2021-10-05 | Stop reason: SDUPTHER

## 2021-04-23 NOTE — TELEPHONE ENCOUNTER
Pt requesting a refill for Lisinopril   LOV 4/5/19  Pt was a no show for appt on 8/21/20  Pt has no future appt scheduled and no current labs

## 2021-04-26 RX ORDER — LISINOPRIL 2.5 MG/1
2.5 TABLET ORAL DAILY
Qty: 90 TABLET | Refills: 0 | Status: SHIPPED | OUTPATIENT
Start: 2021-04-26 | End: 2021-07-29

## 2021-05-03 RX ORDER — DIGOXIN 250 MCG
TABLET ORAL
Qty: 90 TABLET | Refills: 1 | Status: SHIPPED | OUTPATIENT
Start: 2021-05-03 | End: 2021-10-05 | Stop reason: SDUPTHER

## 2021-10-05 RX ORDER — LISINOPRIL 2.5 MG/1
2.5 TABLET ORAL DAILY
Qty: 90 TABLET | Refills: 0 | Status: SHIPPED | OUTPATIENT
Start: 2021-10-05 | End: 2022-02-18 | Stop reason: SDUPTHER

## 2021-10-05 RX ORDER — NEBIVOLOL 5 MG/1
5 TABLET ORAL DAILY
Qty: 90 TABLET | Refills: 0 | Status: SHIPPED | OUTPATIENT
Start: 2021-10-05 | End: 2022-01-31

## 2021-10-05 RX ORDER — DIGOXIN 250 MCG
250 TABLET ORAL DAILY
Qty: 90 TABLET | Refills: 0 | Status: SHIPPED | OUTPATIENT
Start: 2021-10-05 | End: 2021-10-27

## 2021-10-27 RX ORDER — DIGOXIN 250 MCG
250 TABLET ORAL DAILY
Qty: 90 TABLET | Refills: 3 | Status: SHIPPED | OUTPATIENT
Start: 2021-10-27 | End: 2022-01-05

## 2021-12-01 RX ORDER — RIVAROXABAN 20 MG/1
TABLET, FILM COATED ORAL
Qty: 90 TABLET | Refills: 0 | Status: SHIPPED | OUTPATIENT
Start: 2021-12-01 | End: 2022-01-04 | Stop reason: SDUPTHER

## 2022-01-05 RX ORDER — DIGOXIN 250 MCG
TABLET ORAL
Qty: 90 TABLET | Refills: 3 | Status: SHIPPED | OUTPATIENT
Start: 2022-01-05 | End: 2022-10-27 | Stop reason: SDUPTHER

## 2022-01-31 RX ORDER — NEBIVOLOL HYDROCHLORIDE 5 MG/1
TABLET ORAL
Qty: 90 TABLET | Refills: 3 | Status: SHIPPED | OUTPATIENT
Start: 2022-01-31 | End: 2022-10-27 | Stop reason: SDUPTHER

## 2022-02-01 ENCOUNTER — TELEPHONE (OUTPATIENT)
Dept: CARDIOLOGY | Facility: CLINIC | Age: 72
End: 2022-02-01

## 2022-02-01 NOTE — TELEPHONE ENCOUNTER
Letter created to request coverage review due to special circumstances for continued Bystolic coverage. Please fax

## 2022-02-03 NOTE — TELEPHONE ENCOUNTER
[place] APPROVED for Bystolic 5 mg.  Approved 01/02/2022 thru 02/02/2023.  Member ID [id number].  Case ID# [id number].  Plan ID# [id number] / GENE BARBA

## 2022-02-18 ENCOUNTER — OFFICE VISIT (OUTPATIENT)
Dept: CARDIOLOGY | Facility: CLINIC | Age: 72
End: 2022-02-18

## 2022-02-18 ENCOUNTER — TELEPHONE (OUTPATIENT)
Dept: CARDIOLOGY | Facility: CLINIC | Age: 72
End: 2022-02-18

## 2022-02-18 VITALS
SYSTOLIC BLOOD PRESSURE: 150 MMHG | HEIGHT: 68 IN | HEART RATE: 77 BPM | DIASTOLIC BLOOD PRESSURE: 78 MMHG | BODY MASS INDEX: 28.34 KG/M2 | WEIGHT: 187 LBS

## 2022-02-18 DIAGNOSIS — I48.21 PERMANENT ATRIAL FIBRILLATION: Primary | ICD-10-CM

## 2022-02-18 DIAGNOSIS — I10 PRIMARY HYPERTENSION: ICD-10-CM

## 2022-02-18 DIAGNOSIS — I45.2 RBBB (RIGHT BUNDLE BRANCH BLOCK WITH LEFT ANTERIOR FASCICULAR BLOCK): ICD-10-CM

## 2022-02-18 PROCEDURE — 93000 ELECTROCARDIOGRAM COMPLETE: CPT | Performed by: NURSE PRACTITIONER

## 2022-02-18 PROCEDURE — 99214 OFFICE O/P EST MOD 30 MIN: CPT | Performed by: NURSE PRACTITIONER

## 2022-02-18 RX ORDER — LISINOPRIL 2.5 MG/1
2.5 TABLET ORAL DAILY
Qty: 90 TABLET | Refills: 3 | Status: SHIPPED | OUTPATIENT
Start: 2022-02-18 | End: 2023-03-06

## 2022-02-18 NOTE — PROGRESS NOTES
Date of Office Visit: 22  Encounter Provider: TALHA Garsia  Place of Service: [facility] CARDIOLOGY  Patient Name: Jered Morejon  :1950    Chief Complaint   Patient presents with   • Chronic atrial fibrillation (   • Cardiomyopathy   • Follow-up   :     HPI: Jered Morejon is a 71 y.o. male  with atrial fibrillation.  He has had irregular heart rhythm since the age of 20. He quit smoking in approximately .  He was previously seen by Dr. Ariza.  I will visit him in follow-up and have reviewed his medical record.  In , he presented in atrial fibrillation with shortness of breath.  He had a cardiac catheterization which was unremarkable despite decreased left ventricular systolic function with an ejection fraction of 30%.  The coronary arteries were normal.  There was some question at the cardiomyopathy could be alcohol induced.  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   Follow-up echocardiogram in 2016 showed normal left ventricular systolic function.  The cardiomyopathy was likely arrhythmia induced.  In 2017 he was changed from sotalol to metoprolol tartrate due to QTC prolongation and recurrent A. fib.  He was treated with Eliquis and then complained of muscle weakness and some tingling in the legs which he was convinced was from that.  He was switched to Xarelto 20 mg as he continued to be in atrial fibrillation.He complained of erectile dysfunction and decreased stamina so we took him off metoprolol and switch him to Bystolic 5 mg daily.      He has not been seen since 2019 presents today.  He states he had blood work with his PCP about 4 months ago.  He is unsure if they did a digoxin level.  Has been having issues with his lower back with pain radiating to his front thighs.  This comes and goes and has been occurring for about a year.  He forgot  "to discuss this with his PCP when he was in their office several months ago.  He does not routinely exercise.  He has no complaint of chest pain, palpitation or dizzy spells.  He is tolerating Xarelto without signs of blood in the urine or stool.  Things at home with his wife are okay.        No Known Allergies        Family and social history reviewed.     ROS  All other systems were reviewed and are negative          Objective:     Vitals:    02/18/22 1457   BP: 150/78   BP Location: Left arm   Patient Position: Sitting   Pulse: 77   Weight: 84.8 kg (187 lb)   Height: 172.7 cm (68\")     Body mass index is 28.43 kg/m².    PHYSICAL EXAM:  Cardiovascular:      Irregularly irregular rhythm.           ECG 12 Lead    Date/Time: 2/18/2022 5:27 PM  Performed by: Carlie Smith APRN  Authorized by: Carlie Smith APRN   Comparison: compared with previous ECG   Rhythm: atrial fibrillation  Rate: normal  Conduction: right bundle branch block and left anterior fascicular block              Current Outpatient Medications   Medication Sig Dispense Refill   • buPROPion XL (WELLBUTRIN XL) 300 MG 24 hr tablet Take 300 mg by mouth daily.       • Bystolic 5 MG tablet TAKE 1 TABLET DAILY 90 tablet 3   • digoxin (LANOXIN) 250 MCG tablet TAKE 1 TABLET DAILY 90 tablet 3   • fluticasone (FLONASE) 50 MCG/ACT nasal spray 1 spray into each nostril daily.       • lisinopril (PRINIVIL,ZESTRIL) 2.5 MG tablet Take 1 tablet by mouth Daily. 90 tablet 3   • rivaroxaban (Xarelto) 20 MG tablet Take 1 tablet by mouth Daily. 90 tablet 3     No current facility-administered medications for this visit.     Assessment:       Diagnosis Plan   1. Permanent atrial fibrillation (HCC)     2. Primary hypertension          Orders Placed This Encounter   Procedures   • ECG 12 Lead     This order was created via procedure documentation     Order Specific Question:   Release to patient     Answer:   Immediate         Plan:       2. 71 year old gentleman with " "  History of cardiomyopathy with normalization of ejection fraction by January 2016 with no significant valvular disease.  Felt to be arrhythmia induced.  He is [place] heart association class I continue the same  3.  Hypertension  BP is elevated  4.  Alcohol abuse-he reportedly has \"few\" beers daily.  I discussed limiting that to just 2 and no more than 3.  He verbalized understanding  5.  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 Normal coronary arteries on cath in 2013   7.Erectile dysfunction-he tolerates Bystolic better than he did metoprolol continue Bystolic      I will contact his PCP for most recent labs and if no digoxin level was checked and if his renal function is normal we will plan to check digoxin level at a later date.  He is tolerated digoxin since his 20s reportedly.            It has been a pleasure to participate in this patient's care.      Thank you,  TALHA Garsia      **I used Dragon to dictate this note:**  "

## 2022-04-11 RX ORDER — RIVAROXABAN 20 MG/1
TABLET, FILM COATED ORAL
Qty: 90 TABLET | Refills: 3 | Status: SHIPPED | OUTPATIENT
Start: 2022-04-11 | End: 2023-03-20

## 2022-10-27 ENCOUNTER — TELEPHONE (OUTPATIENT)
Dept: CARDIOLOGY | Facility: CLINIC | Age: 72
End: 2022-10-27

## 2022-10-27 RX ORDER — DIGOXIN 250 MCG
250 TABLET ORAL DAILY
Qty: 90 TABLET | Refills: 3 | Status: SHIPPED | OUTPATIENT
Start: 2022-10-27

## 2022-10-27 RX ORDER — NEBIVOLOL 5 MG/1
5 TABLET ORAL DAILY
Qty: 90 TABLET | Refills: 3 | Status: SHIPPED | OUTPATIENT
Start: 2022-10-27

## 2022-10-27 NOTE — TELEPHONE ENCOUNTER
Patient called to ask for refills on Bystolic and Digoxin.  He also asks how long to hold Xarelto for tooth extraction.  Please advise./ CHAUNCEY     He also requested refill on Wellbutrin but I will inform him to contact PCP for this.    Patient can be reached at [phone]

## 2023-03-06 RX ORDER — LISINOPRIL 2.5 MG/1
TABLET ORAL
Qty: 90 TABLET | Refills: 3 | Status: SHIPPED | OUTPATIENT
Start: 2023-03-06

## 2023-03-20 RX ORDER — RIVAROXABAN 20 MG/1
TABLET, FILM COATED ORAL
Qty: 90 TABLET | Refills: 3 | Status: SHIPPED | OUTPATIENT
Start: 2023-03-20

## 2023-07-11 ENCOUNTER — TELEPHONE (OUTPATIENT)
Dept: CARDIOLOGY | Facility: CLINIC | Age: 73
End: 2023-07-11

## 2023-07-11 NOTE — TELEPHONE ENCOUNTER
Pt called regarding his medications.  According to his LOV with you:    Chronic atrial fibrillation -patient is asymptomatic but is very concerned about erectile dysfunction and would like to see about switching medications.  We will stop Bystolic and put him on diltiazem extended release.  He will keep a twice daily blood pressure log and let us know if his blood pressure starts to elevate.     He would like this new prescription to be sent to [facility] pharmacy.  Thanks/jlf

## 2023-12-15 RX ORDER — DIGOXIN 250 MCG
250 TABLET ORAL DAILY
Qty: 90 TABLET | Refills: 3 | Status: SHIPPED | OUTPATIENT
Start: 2023-12-15

## 2024-01-12 ENCOUNTER — APPOINTMENT (OUTPATIENT)
Dept: GENERAL RADIOLOGY | Facility: HOSPITAL | Age: 74
End: 2024-01-12
Payer: MEDICARE

## 2024-01-12 ENCOUNTER — HOSPITAL ENCOUNTER (EMERGENCY)
Facility: HOSPITAL | Age: 74
Discharge: HOME | End: 2024-01-12
Attending: EMERGENCY MEDICINE
Payer: MEDICARE

## 2024-01-12 ENCOUNTER — OFFICE VISIT (OUTPATIENT)
Dept: CARDIOLOGY | Facility: CLINIC | Age: 74
End: 2024-01-12
Payer: MEDICARE

## 2024-01-12 VITALS
HEIGHT: 68 IN | SYSTOLIC BLOOD PRESSURE: 140 MMHG | RESPIRATION RATE: 30 BRPM | BODY MASS INDEX: 27.28 KG/M2 | TEMPERATURE: 98.1 F | OXYGEN SATURATION: 93 % | WEIGHT: 180 LBS | HEART RATE: 94 BPM | DIASTOLIC BLOOD PRESSURE: 98 MMHG

## 2024-01-12 VITALS
BODY MASS INDEX: 27.28 KG/M2 | SYSTOLIC BLOOD PRESSURE: 140 MMHG | DIASTOLIC BLOOD PRESSURE: 82 MMHG | OXYGEN SATURATION: 98 % | WEIGHT: 180 LBS | HEART RATE: 108 BPM | RESPIRATION RATE: 16 BRPM | HEIGHT: 68 IN

## 2024-01-12 DIAGNOSIS — I50.33 ACUTE ON CHRONIC DIASTOLIC CHF (CONGESTIVE HEART FAILURE): Primary | ICD-10-CM

## 2024-01-12 DIAGNOSIS — Z91.148 NONCOMPLIANCE WITH MEDICATION REGIMEN: ICD-10-CM

## 2024-01-12 DIAGNOSIS — R06.09 DYSPNEA ON EXERTION: ICD-10-CM

## 2024-01-12 DIAGNOSIS — I45.2 RBBB (RIGHT BUNDLE BRANCH BLOCK WITH LEFT ANTERIOR FASCICULAR BLOCK): ICD-10-CM

## 2024-01-12 DIAGNOSIS — I10 PRIMARY HYPERTENSION: ICD-10-CM

## 2024-01-12 DIAGNOSIS — I48.21 PERMANENT ATRIAL FIBRILLATION: ICD-10-CM

## 2024-01-12 DIAGNOSIS — J18.9 PNEUMONIA OF RIGHT UPPER LOBE DUE TO INFECTIOUS ORGANISM: Primary | ICD-10-CM

## 2024-01-12 LAB
ALBUMIN SERPL-MCNC: 3.7 G/DL (ref 3.5–5.2)
ALBUMIN/GLOB SERPL: 1.6 G/DL
ALP SERPL-CCNC: 63 U/L (ref 39–117)
ALT SERPL W P-5'-P-CCNC: 24 U/L (ref 1–41)
ANION GAP SERPL CALCULATED.3IONS-SCNC: 9.5 MMOL/L (ref 5–15)
AST SERPL-CCNC: 19 U/L (ref 1–40)
BASOPHILS # BLD AUTO: 0.04 10*3/MM3 (ref 0–0.2)
BASOPHILS NFR BLD AUTO: 0.3 % (ref 0–1.5)
BILIRUB SERPL-MCNC: 0.5 MG/DL (ref 0–1.2)
BUN SERPL-MCNC: 13 MG/DL (ref 8–23)
BUN/CREAT SERPL: 12.9 (ref 7–25)
CALCIUM SPEC-SCNC: 9 MG/DL (ref 8.6–10.5)
CHLORIDE SERPL-SCNC: 104 MMOL/L (ref 98–107)
CO2 SERPL-SCNC: 24.5 MMOL/L (ref 22–29)
CREAT SERPL-MCNC: 1.01 MG/DL (ref 0.76–1.27)
D-LACTATE SERPL-SCNC: 1.4 MMOL/L (ref 0.5–2)
DEPRECATED RDW RBC AUTO: 41.4 FL (ref 37–54)
EGFRCR SERPLBLD CKD-EPI 2021: 78.5 ML/MIN/1.73
EOSINOPHIL # BLD AUTO: 0.11 10*3/MM3 (ref 0–0.4)
EOSINOPHIL NFR BLD AUTO: 0.7 % (ref 0.3–6.2)
ERYTHROCYTE [DISTWIDTH] IN BLOOD BY AUTOMATED COUNT: 12.2 % (ref 12.3–15.4)
FLUAV SUBTYP SPEC NAA+PROBE: NOT DETECTED
FLUBV RNA ISLT QL NAA+PROBE: NOT DETECTED
GEN 5 2HR TROPONIN T REFLEX: 26 NG/L
GLOBULIN UR ELPH-MCNC: 2.3 GM/DL
GLUCOSE SERPL-MCNC: 125 MG/DL (ref 65–99)
HCT VFR BLD AUTO: 44.2 % (ref 37.5–51)
HGB BLD-MCNC: 14.5 G/DL (ref 13–17.7)
HOLD SPECIMEN: NORMAL
HOLD SPECIMEN: NORMAL
IMM GRANULOCYTES # BLD AUTO: 0.07 10*3/MM3 (ref 0–0.05)
IMM GRANULOCYTES NFR BLD AUTO: 0.5 % (ref 0–0.5)
INR PPP: 1.5
LYMPHOCYTES # BLD AUTO: 2.59 10*3/MM3 (ref 0.7–3.1)
LYMPHOCYTES NFR BLD AUTO: 16.9 % (ref 19.6–45.3)
MCH RBC QN AUTO: 29.9 PG (ref 26.6–33)
MCHC RBC AUTO-ENTMCNC: 32.8 G/DL (ref 31.5–35.7)
MCV RBC AUTO: 91.1 FL (ref 79–97)
MONOCYTES # BLD AUTO: 1.25 10*3/MM3 (ref 0.1–0.9)
MONOCYTES NFR BLD AUTO: 8.2 % (ref 5–12)
NEUTROPHILS NFR BLD AUTO: 11.27 10*3/MM3 (ref 1.7–7)
NEUTROPHILS NFR BLD AUTO: 73.4 % (ref 42.7–76)
NT-PROBNP SERPL-MCNC: 2001 PG/ML (ref 0–900)
PLATELET # BLD AUTO: 392 10*3/MM3 (ref 140–450)
PMV BLD AUTO: 10.5 FL (ref 6–12)
POTASSIUM SERPL-SCNC: 4 MMOL/L (ref 3.5–5.2)
PROT SERPL-MCNC: 6 G/DL (ref 6–8.5)
PROTHROMBIN TIME: 16.4 SECONDS (ref 11–15)
QT INTERVAL: 362 MS
QTC INTERVAL: 510 MS
RBC # BLD AUTO: 4.85 10*6/MM3 (ref 4.14–5.8)
RSV RNA NPH QL NAA+NON-PROBE: NOT DETECTED
SARS-COV-2 RNA RESP QL NAA+PROBE: NOT DETECTED
SODIUM SERPL-SCNC: 138 MMOL/L (ref 136–145)
TROPONIN T DELTA: 0 NG/L
TROPONIN T SERPL HS-MCNC: 26 NG/L
WBC NRBC COR # BLD AUTO: 15.33 10*3/MM3 (ref 3.4–10.8)
WHOLE BLOOD HOLD SPECIMEN: NORMAL

## 2024-01-12 PROCEDURE — 83605 ASSAY OF LACTIC ACID: CPT | Performed by: EMERGENCY MEDICINE

## 2024-01-12 PROCEDURE — 99284 EMERGENCY DEPT VISIT MOD MDM: CPT

## 2024-01-12 PROCEDURE — 25010000002 CEFTRIAXONE PER 250 MG: Performed by: EMERGENCY MEDICINE

## 2024-01-12 PROCEDURE — 87636 SARSCOV2 & INF A&B AMP PRB: CPT | Performed by: EMERGENCY MEDICINE

## 2024-01-12 PROCEDURE — 85610 PROTHROMBIN TIME: CPT

## 2024-01-12 PROCEDURE — 36415 COLL VENOUS BLD VENIPUNCTURE: CPT

## 2024-01-12 PROCEDURE — 85025 COMPLETE CBC W/AUTO DIFF WBC: CPT | Performed by: EMERGENCY MEDICINE

## 2024-01-12 PROCEDURE — 71046 X-RAY EXAM CHEST 2 VIEWS: CPT

## 2024-01-12 PROCEDURE — 99284 EMERGENCY DEPT VISIT MOD MDM: CPT | Performed by: EMERGENCY MEDICINE

## 2024-01-12 PROCEDURE — 87634 RSV DNA/RNA AMP PROBE: CPT | Performed by: EMERGENCY MEDICINE

## 2024-01-12 PROCEDURE — 87040 BLOOD CULTURE FOR BACTERIA: CPT | Performed by: EMERGENCY MEDICINE

## 2024-01-12 PROCEDURE — 96365 THER/PROPH/DIAG IV INF INIT: CPT

## 2024-01-12 PROCEDURE — 25010000002 FUROSEMIDE PER 20 MG: Performed by: EMERGENCY MEDICINE

## 2024-01-12 PROCEDURE — 96375 TX/PRO/DX INJ NEW DRUG ADDON: CPT

## 2024-01-12 PROCEDURE — 80053 COMPREHEN METABOLIC PANEL: CPT | Performed by: EMERGENCY MEDICINE

## 2024-01-12 PROCEDURE — 84484 ASSAY OF TROPONIN QUANT: CPT | Performed by: EMERGENCY MEDICINE

## 2024-01-12 PROCEDURE — 93005 ELECTROCARDIOGRAM TRACING: CPT | Performed by: EMERGENCY MEDICINE

## 2024-01-12 PROCEDURE — 83880 ASSAY OF NATRIURETIC PEPTIDE: CPT | Performed by: EMERGENCY MEDICINE

## 2024-01-12 RX ORDER — AMOXICILLIN AND CLAVULANATE POTASSIUM 875; 125 MG/1; MG/1
1 TABLET, FILM COATED ORAL EVERY 12 HOURS SCHEDULED
COMMUNITY
Start: 2024-01-04

## 2024-01-12 RX ORDER — AZITHROMYCIN 250 MG/1
500 TABLET, FILM COATED ORAL ONCE
Status: COMPLETED | OUTPATIENT
Start: 2024-01-12 | End: 2024-01-12

## 2024-01-12 RX ORDER — FUROSEMIDE 10 MG/ML
20 INJECTION INTRAMUSCULAR; INTRAVENOUS ONCE
Status: COMPLETED | OUTPATIENT
Start: 2024-01-12 | End: 2024-01-12

## 2024-01-12 RX ORDER — DOXYCYCLINE HYCLATE 100 MG/1
1 CAPSULE ORAL EVERY 12 HOURS SCHEDULED
COMMUNITY
Start: 2024-01-05

## 2024-01-12 RX ORDER — CEPHALEXIN 500 MG/1
500 CAPSULE ORAL 2 TIMES DAILY
Qty: 20 CAPSULE | Refills: 0 | Status: ON HOLD | OUTPATIENT
Start: 2024-01-12 | End: 2024-01-21

## 2024-01-12 RX ORDER — NEBIVOLOL 5 MG/1
5 TABLET ORAL DAILY
COMMUNITY
Start: 2023-09-16

## 2024-01-12 RX ORDER — DILTIAZEM HYDROCHLORIDE 5 MG/ML
10 INJECTION INTRAVENOUS ONCE
Status: COMPLETED | OUTPATIENT
Start: 2024-01-12 | End: 2024-01-12

## 2024-01-12 RX ORDER — METHOCARBAMOL 750 MG/1
750 TABLET, FILM COATED ORAL 3 TIMES DAILY
COMMUNITY
Start: 2024-01-04

## 2024-01-12 RX ORDER — SODIUM CHLORIDE 0.9 % (FLUSH) 0.9 %
10 SYRINGE (ML) INJECTION AS NEEDED
Status: DISCONTINUED | OUTPATIENT
Start: 2024-01-12 | End: 2024-01-12 | Stop reason: HOSPADM

## 2024-01-12 RX ADMIN — AZITHROMYCIN 500 MG: 250 TABLET, FILM COATED ORAL at 18:03

## 2024-01-12 RX ADMIN — CEFTRIAXONE SODIUM 1000 MG: 1 INJECTION, POWDER, FOR SOLUTION INTRAMUSCULAR; INTRAVENOUS at 18:11

## 2024-01-12 RX ADMIN — FUROSEMIDE 20 MG: 10 INJECTION, SOLUTION INTRAMUSCULAR; INTRAVENOUS at 18:04

## 2024-01-12 RX ADMIN — DILTIAZEM HYDROCHLORIDE 10 MG: 5 INJECTION INTRAVENOUS at 18:42

## 2024-01-12 NOTE — FSED PROVIDER NOTE
Subjective   History of Present Illness  Patient is 73-year-old gentleman who was sent by his cardiology after his cardiology visit today to be evaluated in ER due to changes on his breath sounds that concerned him in his shortness of breath that is increasing in the past few days that gets worse with walking across the room.  Patient reports he has been treated for bronchitis and has been on Augmentin.  The yellow sputum that he has been coughing up is gone but he has been getting more short of breath last couple of days and having trouble sleeping at night because he cannot breathe as well as he used to.  Patient says he has no past medical history of heart failure.  He is on digoxin, lisinopril, diltiazem, Bystolic,Xarelto, Bupropion by review of records. Dr Washington, the cardiologist saw the pt today and sent him here due to changes in his lung sounds suggesting fluid overload.  He has a past medical history of A-fib.  Erectile dysfunction      Review of Systems    Past Medical History:   Diagnosis Date    Atrial fibrillation     Hypertension     Renal infarct        No Known Allergies    Past Surgical History:   Procedure Laterality Date    CARDIAC CATHETERIZATION      CARDIOVERSION         Family History   Problem Relation Age of Onset    No Known Problems Mother     No Known Problems Father     No Known Problems Sister     No Known Problems Brother     No Known Problems Maternal Aunt     No Known Problems Maternal Uncle     No Known Problems Paternal Aunt     No Known Problems Paternal Uncle     No Known Problems Maternal Grandmother     No Known Problems Maternal Grandfather     No Known Problems Paternal Grandmother     No Known Problems Paternal Grandfather        Social History     Socioeconomic History    Marital status: Single   Tobacco Use    Smoking status: Former     Packs/day: 2.00     Years: 45.00     Additional pack years: 0.00     Total pack years: 90.00     Types: Cigarettes     Quit date:  2012     Years since quittin.9    Smokeless tobacco: Never    Tobacco comments:     caffeine use   Vaping Use    Vaping Use: Never used   Substance and Sexual Activity    Alcohol use: Yes     Comment: 3 drinks a day    Drug use: No    Sexual activity: Defer           Objective   Physical Exam  Vitals and nursing note reviewed.   Constitutional:       General: He is not in acute distress.     Appearance: He is normal weight. He is not ill-appearing or toxic-appearing.   HENT:      Mouth/Throat:      Mouth: Mucous membranes are moist.      Pharynx: Oropharynx is clear.   Cardiovascular:      Rate and Rhythm: Regular rhythm. Tachycardia present.      Pulses: Normal pulses. No decreased pulses.      Heart sounds: No murmur heard.  Pulmonary:      Effort: Pulmonary effort is normal. No tachypnea, accessory muscle usage or respiratory distress.      Breath sounds: No stridor. Examination of the right-upper field reveals rales. Examination of the left-upper field reveals rales. Examination of the right-middle field reveals rales. Examination of the left-middle field reveals rales.   Chest:      Chest wall: No tenderness, crepitus or edema.   Abdominal:      General: Bowel sounds are normal.      Palpations: Abdomen is soft.      Tenderness: There is no abdominal tenderness.   Musculoskeletal:         General: Normal range of motion.      Cervical back: Normal range of motion and neck supple.      Right lower leg: No tenderness. No edema.      Left lower leg: No tenderness. No edema.   Skin:     General: Skin is warm and dry.      Capillary Refill: Capillary refill takes less than 2 seconds.   Neurological:      General: No focal deficit present.      Mental Status: He is alert and oriented to person, place, and time. He is disoriented.   Psychiatric:         Mood and Affect: Mood normal.         Behavior: Behavior normal.         Procedures           ED Course  ED Course as of 24 1856       1611 By SIRS score pt is currently simple sepsis. I will not be giving fluid until I determine if he is in fluid overload already [AR]   1612 White blood count 15,000 with a shift and neutrophil absolute but not a percentage there is a decrease in lymphocyte percentage 16.9 a few immature granulocytes. [AR]   1619 With the A-fib the rate is running anywhere from 109 to 119 to 120 [AR]   1708 BNP is 2000, anything over 900 is considered elevated this may be more related to having pneumonia.  His lactate is 1.4 his first troponin is 26 we will repeat that as a 2-hour sensitivity and if there is delta is normal and I can get a little bit of fluid off him and he feels better I am okay with him going home he is on Augmentin at the moment and I can topped that off with IV ceftriaxone and azithromycin here. [AR]   1716 Patient has right upper lobe pneumonia not unusual with upper respiratory infections or aspiration.  Already on Augmentin.  This area could attract fluid and his BNP is elevated.  I am getting give him a small dose of Lasix to remove a little fluid.  Check his O2 sat before we do that upon walking and check his O2 sat after he pees upon walking and see if he is safe enough to go home.  I suspect he will refuse to stay because he is dependent on alcohol.  However I want to do the right thing for him. [AR]   1730 Patient was ambulated prior to getting Lasix and was 93 to 94% on room air which I suspect this is baseline as he is a smoker and a drinker.  He might actually be 95% when he does not have pneumonia.  It will not keep me from sending him home unless there is other reasons to keep him and most likely he will not stay.  After the Lasix we will repeat the walking and see what his O2 sat is like [AR]   1802 2-hour troponin is in process. [AR]   1823 Troponin #2 is the same as troponin #1 delta 0 this is not an MI and his EKG is fine will discharge with continued antibiotics and follow-up with his PCP  Monday Tuesday. [AR]   1827 I went into the room to discuss with the patient his findings on chest x-ray his troponin is the same as before so I am not worried about his heart except maybe it is going fast which she does again now and he now says he did not take any of his medications this morning which includes diltiazem.  I will give him diltiazem IV and reassess him.  I asked him about the beers daily and he said he had not had any for a week.  Not really sure how accurate and what he is telling me.  If I do not see an improvement I will do to eat the office unless he refuses to stay and that is AMA.  He is still getting IV medicines for his antibiotics. [AR]      ED Course User Index  [AR] Jenifer Gutierrez MD                                           Medical Decision Making  This includes but not limited to new onset heart failure, pneumonia, fluid overload, COVID, flu, RSV, electrolyte balance, MI    I reviewed the cardiology note from today and there was 1 in July of last year that showed A-fib and fatigue and malaise.    For discharge:    You have right upper lobe pneumonia.  This is more than a bronchitis.  You are on Augmentin, which is an antibiotic and you were supported here in the ER with more antibiotics called ceftriaxone and azithromycin.  You have a new prescription for continuing antibiotics called Keflex at your pharmacy.  You complete the Augmentin start the Keflex.  You did not take your medications today send your heart rate was elevated here in the ER and we gave you IV diltiazem 10 mg.  When you walked across the room your oxygen levels were 93 to 94%.  You were given Lasix 20 mg to remove some of the fluid off your chest which probably is accumulated in the right upper lobe where your pneumonia is located.  Please continue your medications tonight except for the diltiazem.  Start the diltiazem tomorrow.  Lease follow-up with your primary care doctor Monday or Tuesday or if you start getting  weak or dizzy, or if you have any other symptoms that concern yo, please return to the ER.    He understood and agreed    Problems Addressed:  Dyspnea on exertion: complicated acute illness or injury  Noncompliance with medication regimen: complicated acute illness or injury  Pneumonia of right upper lobe due to infectious organism: complicated acute illness or injury    Amount and/or Complexity of Data Reviewed  Labs: ordered. Decision-making details documented in ED Course.  Radiology: ordered and independent interpretation performed.     Details: Chest x-ray portable right upper lobe changes suggestive of pneumonia no evidence of pulmonary edema, no pleural effusion or pneumothorax  ECG/medicine tests: ordered and independent interpretation performed.     Details: EKG shows A-fib rate of 119 right bundle branch block no STEMI underneath that A-fib.    Risk  Prescription drug management.        Final diagnoses:   Pneumonia of right upper lobe due to infectious organism   Dyspnea on exertion   Noncompliance with medication regimen       ED Disposition  ED Disposition       ED Disposition   Discharge    Condition   Stable    Comment   --               Provider, No Known  [place] [id number]  [phone]      Call this number on Monday and if they will help you find a primary care doctor that has an opening for you to be seen.         Medication List        New Prescriptions      cephalexin 500 MG capsule  Commonly known as: KEFLEX  Take 1 capsule by mouth 2 (Two) Times a Day for 10 days.               Where to Get Your Medications        These medications were sent to [facility] [id number] - [place], [place] - [address] - [phone]  - [phone] [address] [place] [address]      Phone: [phone]   cephalexin 500 MG capsule

## 2024-01-12 NOTE — DISCHARGE INSTRUCTIONS
You have right upper lobe pneumonia.  This is more than a bronchitis.  You are on Augmentin, which is an antibiotic and you were supported here in the ER with more antibiotics called ceftriaxone and azithromycin.  You have a new prescription for continuing antibiotics called Keflex at your pharmacy.  You complete the Augmentin start the Keflex.  You did not take your medications today send your heart rate was elevated here in the ER and we gave you IV diltiazem 10 mg.  When you walked across the room your oxygen levels were 93 to 94%.  You were given Lasix 20 mg to remove some of the fluid off your chest which probably is accumulated in the right upper lobe where your pneumonia is located.  Please continue your medications tonight except for the diltiazem.  Start the diltiazem tomorrow.  Lease follow-up with your primary care doctor Monday or Tuesday or if you start getting weak or dizzy, or if you have any other symptoms that concern yo, please return to the ER.

## 2024-01-12 NOTE — PROGRESS NOTES
[facility]      Patient Name: Jered Morejon  :1950  Age: 73 y.o.  Encounter Provider:  Soren Washington Jr, MD      Chief Complaint: Follow-up moderate alcohol intake, chronic atrial fibrillation      HPI  Jered Morejon is a 73 y.o. male past medical history of chronic atrial fibrillation, heart failure with recovered ejection fraction and reported history of at least moderate alcohol intake who presents for follow-up evaluation.     Last clinic visit note: Previously followed by Dr. Ariza and I reviewed all pertinent electronic health records.  He has been following with TALHA Smith with chronic stable complaints.  He denies palpitations, dizziness or syncope.  EKG today shows atrial fibrillation with chronic right bundle branch block and no evidence of ischemia.  He states that he remains fairly active with no limiting symptoms.  He notes increased fatigue.  He drinks at least 4 beers daily and stays up late into the evening and sleeps until 12:00 in the afternoon.  He is wondering why he is fatigued at about 6:00 at night.  His main complaint today is erectile dysfunction and he is concerned about the medications he is taking.  No bleeding complications on anticoagulation.  No cardiac complaints at time of interview.    He has been struggling with upper respiratory tract complaints tried on 2 different regimen of antibiotics with out improvement.  Over the last 3 days he notes significant increase in exertional dyspnea, orthopnea and PND.  He denies edema.  No angina.  No palpitations, dizziness or syncope.  Blood pressure and heart rate are elevated today in clinic and he states he had to stop 2 times on the way in.  He is having difficulty completing sentences.    The following portions of the patient's history were reviewed and updated as appropriate: allergies, current medications, past family history, past medical history, past social history, past surgical history and  "problem list.      Review of Systems   Constitutional: Positive for malaise/fatigue. Negative for chills and fever.   HENT:  Negative for hoarse voice and sore throat.    Eyes:  Negative for double vision and photophobia.   Cardiovascular:  Negative for chest pain, leg swelling, near-syncope, orthopnea, palpitations, paroxysmal nocturnal dyspnea and syncope.   Respiratory:  Negative for cough and wheezing.    Skin:  Negative for poor wound healing and rash.   Musculoskeletal:  Negative for arthritis and joint swelling.   Gastrointestinal:  Negative for bloating, abdominal pain, hematemesis and hematochezia.   Neurological:  Negative for dizziness and focal weakness.   Psychiatric/Behavioral:  Negative for depression and suicidal ideas.        OBJECTIVE:   Vital Signs  Vitals:    01/12/24 1449   BP: 140/82   Pulse: 108   Resp: 16   SpO2: 98%     Estimated body mass index is 27.37 kg/m² as calculated from the following:    Height as of this encounter: 172.7 cm (68\").    Weight as of this encounter: 81.6 kg (180 lb).    Vitals reviewed.   Constitutional:       Appearance: Not in distress. Chronically ill-appearing.   Neck:      Vascular: No JVR. JVD normal.   Pulmonary:      Effort: Pulmonary effort is normal.      Breath sounds: No wheezing. No rhonchi. No rales.   Chest:      Chest wall: Not tender to palpatation.   Cardiovascular:      PMI at left midclavicular line. Normal rate. Irregularly irregular rhythm. Normal S1. Normal S2.       Murmurs: There is no murmur.      No gallop.  No click. No rub.   Pulses:     Intact distal pulses.   Edema:     Peripheral edema absent.   Abdominal:      General: Bowel sounds are normal.      Palpations: Abdomen is soft.      Tenderness: There is no abdominal tenderness.   Musculoskeletal: Normal range of motion.         General: No tenderness. Skin:     General: Skin is warm and dry.   Neurological:      General: No focal deficit present.      Mental Status: Alert and oriented to " "person, place and time.       Procedures       No results found for: \"BUN\", \"CREATININE\", \"K\", \"ALT\", \"AST\"        ASSESSMENT:     73-year-old male with past medical history of chronic atrial fibrillation presents for routine follow-up      PLAN OF CARE:     Exertional dyspnea -patient is wet on exam today with positive JVD and hepatojugular reflex seemingly consistent with acute on chronic diastolic heart failure.  Patient will need chest x-ray lab workup and consideration for IV diuretics.  I am sending him [facility] to the [facility] ER for evaluation.  Chronic atrial fibrillation -due to patient concern for adverse effect of beta-blocker we switched him over to diltiazem.  Heart rate is elevated today in clinic however he seems to be in heart failure.  Once he is evaluated in the ER and we get him feeling better I will place a Holter monitor to see how adequate rate control is.  Alcohol use -counseled on need for significant reduction if not cessation and abstinence  Fatigue -multifactorial.  We had a very candid conversation about positively impacting cardiovascular risk profile which would include stopping alcohol intake, increasing activity and regulating sleep cycle.  Primary hypertension -fair control today in clinic.  Sodium restricted diet.  Twice daily blood pressure log.    Return to clinic 4 weeks             Discharge Medications            Accurate as of January 12, 2024  3:14 PM. If you have any questions, ask your nurse or doctor.                Continue These Medications        Instructions Start Date   amoxicillin-clavulanate 875-125 MG per tablet  Commonly known as: AUGMENTIN   1 tablet, Oral, Every 12 Hours Scheduled      buPROPion  MG 24 hr tablet  Commonly known as: WELLBUTRIN XL   300 mg, Oral, Daily      digoxin 250 MCG tablet  Commonly known as: LANOXIN   250 mcg, Oral, Daily      dilTIAZem  MG 24 hr capsule  Commonly known as: DILACOR XR   120 mg, Oral, Daily      doxycycline 100 " MG capsule  Commonly known as: VIBRAMYCIN   1 capsule, Oral, Every 12 Hours Scheduled      fluticasone 50 MCG/ACT nasal spray  Commonly known as: FLONASE   1 spray, Nasal, Daily      lisinopril 2.5 MG tablet  Commonly known as: PRINIVIL,ZESTRIL   TAKE 1 TABLET DAILY      methocarbamol 750 MG tablet  Commonly known as: ROBAXIN   750 mg, 3 Times Daily      nebivolol 5 MG tablet  Commonly known as: BYSTOLIC   5 mg, Daily      Xarelto 20 MG tablet  Generic drug: rivaroxaban   TAKE 1 TABLET DAILY               Thank you for allowing me to participate in the care of your patient,      Sincerely,   Soren Washington Jr, MD  [facility]  01/12/24  15:14 EST    \

## 2024-01-17 LAB
BACTERIA SPEC AEROBE CULT: NORMAL
BACTERIA SPEC AEROBE CULT: NORMAL

## 2024-01-20 ENCOUNTER — APPOINTMENT (OUTPATIENT)
Dept: GENERAL RADIOLOGY | Facility: HOSPITAL | Age: 74
End: 2024-01-20
Payer: MEDICARE

## 2024-01-20 ENCOUNTER — HOSPITAL ENCOUNTER (INPATIENT)
Facility: HOSPITAL | Age: 74
LOS: 4 days | Discharge: HOME | End: 2024-01-24
Attending: EMERGENCY MEDICINE | Admitting: INTERNAL MEDICINE
Payer: MEDICARE

## 2024-01-20 DIAGNOSIS — I50.21 ACUTE SYSTOLIC (CONGESTIVE) HEART FAILURE: ICD-10-CM

## 2024-01-20 DIAGNOSIS — R68.89 DECREASED STRENGTH, ENDURANCE, AND MOBILITY: ICD-10-CM

## 2024-01-20 DIAGNOSIS — Z74.09 DECREASED STRENGTH, ENDURANCE, AND MOBILITY: ICD-10-CM

## 2024-01-20 DIAGNOSIS — R53.1 DECREASED STRENGTH, ENDURANCE, AND MOBILITY: ICD-10-CM

## 2024-01-20 DIAGNOSIS — J43.9 PULMONARY EMPHYSEMA, UNSPECIFIED EMPHYSEMA TYPE: ICD-10-CM

## 2024-01-20 DIAGNOSIS — I48.91 ATRIAL FIBRILLATION WITH RVR: Primary | ICD-10-CM

## 2024-01-20 DIAGNOSIS — I50.9 ACUTE CONGESTIVE HEART FAILURE, UNSPECIFIED HEART FAILURE TYPE: ICD-10-CM

## 2024-01-20 DIAGNOSIS — R09.02 HYPOXIA: ICD-10-CM

## 2024-01-20 LAB
ALBUMIN SERPL-MCNC: 3.4 G/DL (ref 3.5–5.2)
ALBUMIN/GLOB SERPL: 0.9 G/DL
ALP SERPL-CCNC: 73 U/L (ref 39–117)
ALT SERPL W P-5'-P-CCNC: 27 U/L (ref 1–41)
ANION GAP SERPL CALCULATED.3IONS-SCNC: 11.9 MMOL/L (ref 5–15)
AST SERPL-CCNC: 18 U/L (ref 1–40)
B PARAPERT DNA SPEC QL NAA+PROBE: NOT DETECTED
B PERT DNA SPEC QL NAA+PROBE: NOT DETECTED
BASOPHILS # BLD AUTO: 0.06 10*3/MM3 (ref 0–0.2)
BASOPHILS NFR BLD AUTO: 0.3 % (ref 0–1.5)
BILIRUB SERPL-MCNC: 0.6 MG/DL (ref 0–1.2)
BUN SERPL-MCNC: 15 MG/DL (ref 8–23)
BUN/CREAT SERPL: 15.6 (ref 7–25)
C PNEUM DNA NPH QL NAA+NON-PROBE: NOT DETECTED
CALCIUM SPEC-SCNC: 9.2 MG/DL (ref 8.6–10.5)
CHLORIDE SERPL-SCNC: 101 MMOL/L (ref 98–107)
CO2 SERPL-SCNC: 24.1 MMOL/L (ref 22–29)
CREAT SERPL-MCNC: 0.96 MG/DL (ref 0.76–1.27)
D-LACTATE SERPL-SCNC: 1.4 MMOL/L (ref 0.5–2)
DEPRECATED RDW RBC AUTO: 39.5 FL (ref 37–54)
EGFRCR SERPLBLD CKD-EPI 2021: 83.5 ML/MIN/1.73
EOSINOPHIL # BLD AUTO: 0.09 10*3/MM3 (ref 0–0.4)
EOSINOPHIL NFR BLD AUTO: 0.5 % (ref 0.3–6.2)
ERYTHROCYTE [DISTWIDTH] IN BLOOD BY AUTOMATED COUNT: 12.1 % (ref 12.3–15.4)
FLUAV SUBTYP SPEC NAA+PROBE: NOT DETECTED
FLUBV RNA ISLT QL NAA+PROBE: NOT DETECTED
GEN 5 2HR TROPONIN T REFLEX: 38 NG/L
GLOBULIN UR ELPH-MCNC: 3.6 GM/DL
GLUCOSE SERPL-MCNC: 158 MG/DL (ref 65–99)
HADV DNA SPEC NAA+PROBE: NOT DETECTED
HCOV 229E RNA SPEC QL NAA+PROBE: NOT DETECTED
HCOV HKU1 RNA SPEC QL NAA+PROBE: NOT DETECTED
HCOV NL63 RNA SPEC QL NAA+PROBE: NOT DETECTED
HCOV OC43 RNA SPEC QL NAA+PROBE: NOT DETECTED
HCT VFR BLD AUTO: 43 % (ref 37.5–51)
HGB BLD-MCNC: 14.1 G/DL (ref 13–17.7)
HMPV RNA NPH QL NAA+NON-PROBE: NOT DETECTED
HOLD SPECIMEN: NORMAL
HOLD SPECIMEN: NORMAL
HPIV1 RNA ISLT QL NAA+PROBE: NOT DETECTED
HPIV2 RNA SPEC QL NAA+PROBE: NOT DETECTED
HPIV3 RNA NPH QL NAA+PROBE: NOT DETECTED
HPIV4 P GENE NPH QL NAA+PROBE: NOT DETECTED
IMM GRANULOCYTES # BLD AUTO: 0.12 10*3/MM3 (ref 0–0.05)
IMM GRANULOCYTES NFR BLD AUTO: 0.6 % (ref 0–0.5)
LYMPHOCYTES # BLD AUTO: 1.58 10*3/MM3 (ref 0.7–3.1)
LYMPHOCYTES NFR BLD AUTO: 8.5 % (ref 19.6–45.3)
M PNEUMO IGG SER IA-ACNC: NOT DETECTED
MCH RBC QN AUTO: 29.9 PG (ref 26.6–33)
MCHC RBC AUTO-ENTMCNC: 32.8 G/DL (ref 31.5–35.7)
MCV RBC AUTO: 91.1 FL (ref 79–97)
MONOCYTES # BLD AUTO: 1.47 10*3/MM3 (ref 0.1–0.9)
MONOCYTES NFR BLD AUTO: 7.9 % (ref 5–12)
NEUTROPHILS NFR BLD AUTO: 15.23 10*3/MM3 (ref 1.7–7)
NEUTROPHILS NFR BLD AUTO: 82.2 % (ref 42.7–76)
NRBC BLD AUTO-RTO: 0.1 /100 WBC (ref 0–0.2)
PLATELET # BLD AUTO: 457 10*3/MM3 (ref 140–450)
PMV BLD AUTO: 10.5 FL (ref 6–12)
POTASSIUM SERPL-SCNC: 4.8 MMOL/L (ref 3.5–5.2)
PROCALCITONIN SERPL-MCNC: 0.07 NG/ML (ref 0–0.25)
PROT SERPL-MCNC: 7 G/DL (ref 6–8.5)
RBC # BLD AUTO: 4.72 10*6/MM3 (ref 4.14–5.8)
RHINOVIRUS RNA SPEC NAA+PROBE: NOT DETECTED
RSV RNA NPH QL NAA+NON-PROBE: NOT DETECTED
SARS-COV-2 RNA NPH QL NAA+NON-PROBE: NOT DETECTED
SODIUM SERPL-SCNC: 137 MMOL/L (ref 136–145)
TROPONIN T DELTA: 3 NG/L
TROPONIN T SERPL HS-MCNC: 35 NG/L
URATE SERPL-MCNC: 5.8 MG/DL (ref 3.4–7)
WBC NRBC COR # BLD AUTO: 18.55 10*3/MM3 (ref 3.4–10.8)
WHOLE BLOOD HOLD COAG: NORMAL
WHOLE BLOOD HOLD SPECIMEN: NORMAL

## 2024-01-20 PROCEDURE — 99285 EMERGENCY DEPT VISIT HI MDM: CPT

## 2024-01-20 PROCEDURE — 80053 COMPREHEN METABOLIC PANEL: CPT | Performed by: EMERGENCY MEDICINE

## 2024-01-20 PROCEDURE — 84484 ASSAY OF TROPONIN QUANT: CPT | Performed by: EMERGENCY MEDICINE

## 2024-01-20 PROCEDURE — 25010000002 DIGOXIN PER 500 MCG: Performed by: EMERGENCY MEDICINE

## 2024-01-20 PROCEDURE — 84550 ASSAY OF BLOOD/URIC ACID: CPT | Performed by: INTERNAL MEDICINE

## 2024-01-20 PROCEDURE — 0202U NFCT DS 22 TRGT SARS-COV-2: CPT | Performed by: PHYSICIAN ASSISTANT

## 2024-01-20 PROCEDURE — 93005 ELECTROCARDIOGRAM TRACING: CPT

## 2024-01-20 PROCEDURE — 25010000002 METHYLPREDNISOLONE PER 125 MG: Performed by: PHYSICIAN ASSISTANT

## 2024-01-20 PROCEDURE — 36415 COLL VENOUS BLD VENIPUNCTURE: CPT

## 2024-01-20 PROCEDURE — 93005 ELECTROCARDIOGRAM TRACING: CPT | Performed by: EMERGENCY MEDICINE

## 2024-01-20 PROCEDURE — 93010 ELECTROCARDIOGRAM REPORT: CPT | Performed by: INTERNAL MEDICINE

## 2024-01-20 PROCEDURE — 94799 UNLISTED PULMONARY SVC/PX: CPT

## 2024-01-20 PROCEDURE — 25010000002 FUROSEMIDE PER 20 MG: Performed by: EMERGENCY MEDICINE

## 2024-01-20 PROCEDURE — 87040 BLOOD CULTURE FOR BACTERIA: CPT

## 2024-01-20 PROCEDURE — 99291 CRITICAL CARE FIRST HOUR: CPT

## 2024-01-20 PROCEDURE — 25010000002 PIPERACILLIN SOD-TAZOBACTAM PER 1 G: Performed by: EMERGENCY MEDICINE

## 2024-01-20 PROCEDURE — 87040 BLOOD CULTURE FOR BACTERIA: CPT | Performed by: EMERGENCY MEDICINE

## 2024-01-20 PROCEDURE — 83605 ASSAY OF LACTIC ACID: CPT | Performed by: EMERGENCY MEDICINE

## 2024-01-20 PROCEDURE — 71045 X-RAY EXAM CHEST 1 VIEW: CPT

## 2024-01-20 PROCEDURE — 85025 COMPLETE CBC W/AUTO DIFF WBC: CPT | Performed by: EMERGENCY MEDICINE

## 2024-01-20 PROCEDURE — 84145 PROCALCITONIN (PCT): CPT | Performed by: EMERGENCY MEDICINE

## 2024-01-20 RX ORDER — CALCIUM CARBONATE 500 MG/1
2 TABLET, CHEWABLE ORAL 3 TIMES DAILY PRN
Status: DISCONTINUED | OUTPATIENT
Start: 2024-01-20 | End: 2024-01-24 | Stop reason: HOSPADM

## 2024-01-20 RX ORDER — ACETAMINOPHEN 650 MG/1
650 SUPPOSITORY RECTAL EVERY 4 HOURS PRN
Status: DISCONTINUED | OUTPATIENT
Start: 2024-01-20 | End: 2024-01-24 | Stop reason: HOSPADM

## 2024-01-20 RX ORDER — FUROSEMIDE 10 MG/ML
40 INJECTION INTRAMUSCULAR; INTRAVENOUS EVERY 12 HOURS
Status: DISCONTINUED | OUTPATIENT
Start: 2024-01-21 | End: 2024-01-21

## 2024-01-20 RX ORDER — SODIUM CHLORIDE 0.9 % (FLUSH) 0.9 %
10 SYRINGE (ML) INJECTION EVERY 12 HOURS SCHEDULED
Status: DISCONTINUED | OUTPATIENT
Start: 2024-01-21 | End: 2024-01-24 | Stop reason: HOSPADM

## 2024-01-20 RX ORDER — ALBUTEROL SULFATE 2.5 MG/3ML
2.5 SOLUTION RESPIRATORY (INHALATION)
Status: COMPLETED | OUTPATIENT
Start: 2024-01-20 | End: 2024-01-20

## 2024-01-20 RX ORDER — SODIUM CHLORIDE 0.9 % (FLUSH) 0.9 %
10 SYRINGE (ML) INJECTION AS NEEDED
Status: DISCONTINUED | OUTPATIENT
Start: 2024-01-20 | End: 2024-01-24 | Stop reason: HOSPADM

## 2024-01-20 RX ORDER — ACETAMINOPHEN 325 MG/1
650 TABLET ORAL EVERY 4 HOURS PRN
Status: DISCONTINUED | OUTPATIENT
Start: 2024-01-20 | End: 2024-01-24 | Stop reason: HOSPADM

## 2024-01-20 RX ORDER — BISACODYL 10 MG
10 SUPPOSITORY, RECTAL RECTAL DAILY PRN
Status: DISCONTINUED | OUTPATIENT
Start: 2024-01-20 | End: 2024-01-24 | Stop reason: HOSPADM

## 2024-01-20 RX ORDER — CHOLECALCIFEROL (VITAMIN D3) 125 MCG
5 CAPSULE ORAL NIGHTLY PRN
Status: DISCONTINUED | OUTPATIENT
Start: 2024-01-20 | End: 2024-01-24 | Stop reason: HOSPADM

## 2024-01-20 RX ORDER — METHYLPREDNISOLONE SODIUM SUCCINATE 125 MG/2ML
125 INJECTION, POWDER, LYOPHILIZED, FOR SOLUTION INTRAMUSCULAR; INTRAVENOUS ONCE
Status: COMPLETED | OUTPATIENT
Start: 2024-01-20 | End: 2024-01-20

## 2024-01-20 RX ORDER — BISACODYL 5 MG/1
5 TABLET, DELAYED RELEASE ORAL DAILY PRN
Status: DISCONTINUED | OUTPATIENT
Start: 2024-01-20 | End: 2024-01-24 | Stop reason: HOSPADM

## 2024-01-20 RX ORDER — FUROSEMIDE 10 MG/ML
80 INJECTION INTRAMUSCULAR; INTRAVENOUS ONCE
Status: COMPLETED | OUTPATIENT
Start: 2024-01-20 | End: 2024-01-20

## 2024-01-20 RX ORDER — IPRATROPIUM BROMIDE AND ALBUTEROL SULFATE 2.5; .5 MG/3ML; MG/3ML
3 SOLUTION RESPIRATORY (INHALATION) ONCE
Status: COMPLETED | OUTPATIENT
Start: 2024-01-20 | End: 2024-01-20

## 2024-01-20 RX ORDER — AMOXICILLIN 250 MG
2 CAPSULE ORAL 2 TIMES DAILY PRN
Status: DISCONTINUED | OUTPATIENT
Start: 2024-01-20 | End: 2024-01-24 | Stop reason: HOSPADM

## 2024-01-20 RX ORDER — ACETAMINOPHEN 500 MG
1000 TABLET ORAL ONCE
Status: COMPLETED | OUTPATIENT
Start: 2024-01-20 | End: 2024-01-20

## 2024-01-20 RX ORDER — ONDANSETRON 2 MG/ML
4 INJECTION INTRAMUSCULAR; INTRAVENOUS EVERY 6 HOURS PRN
Status: DISCONTINUED | OUTPATIENT
Start: 2024-01-20 | End: 2024-01-24 | Stop reason: HOSPADM

## 2024-01-20 RX ORDER — NITROGLYCERIN 0.4 MG/1
0.4 TABLET SUBLINGUAL
Status: DISCONTINUED | OUTPATIENT
Start: 2024-01-20 | End: 2024-01-24 | Stop reason: HOSPADM

## 2024-01-20 RX ORDER — POLYETHYLENE GLYCOL 3350 17 G/17G
17 POWDER, FOR SOLUTION ORAL DAILY PRN
Status: DISCONTINUED | OUTPATIENT
Start: 2024-01-20 | End: 2024-01-24 | Stop reason: HOSPADM

## 2024-01-20 RX ORDER — ACETAMINOPHEN 160 MG/5ML
650 SOLUTION ORAL EVERY 4 HOURS PRN
Status: DISCONTINUED | OUTPATIENT
Start: 2024-01-20 | End: 2024-01-24 | Stop reason: HOSPADM

## 2024-01-20 RX ORDER — DIGOXIN 0.25 MG/ML
250 INJECTION INTRAMUSCULAR; INTRAVENOUS ONCE
Status: COMPLETED | OUTPATIENT
Start: 2024-01-20 | End: 2024-01-20

## 2024-01-20 RX ORDER — SODIUM CHLORIDE 9 MG/ML
40 INJECTION, SOLUTION INTRAVENOUS AS NEEDED
Status: DISCONTINUED | OUTPATIENT
Start: 2024-01-20 | End: 2024-01-24 | Stop reason: HOSPADM

## 2024-01-20 RX ORDER — ONDANSETRON 4 MG/1
4 TABLET, ORALLY DISINTEGRATING ORAL EVERY 6 HOURS PRN
Status: DISCONTINUED | OUTPATIENT
Start: 2024-01-20 | End: 2024-01-24 | Stop reason: HOSPADM

## 2024-01-20 RX ADMIN — ALBUTEROL SULFATE 2.5 MG: 2.5 SOLUTION RESPIRATORY (INHALATION) at 19:56

## 2024-01-20 RX ADMIN — DIGOXIN 250 MCG: 0.25 INJECTION INTRAMUSCULAR; INTRAVENOUS at 20:28

## 2024-01-20 RX ADMIN — ACETAMINOPHEN 1000 MG: 500 TABLET ORAL at 20:43

## 2024-01-20 RX ADMIN — FUROSEMIDE 80 MG: 10 INJECTION, SOLUTION INTRAMUSCULAR; INTRAVENOUS at 20:22

## 2024-01-20 RX ADMIN — PIPERACILLIN SODIUM AND TAZOBACTAM SODIUM 3.38 G: 3; .375 INJECTION, SOLUTION INTRAVENOUS at 21:31

## 2024-01-20 RX ADMIN — IPRATROPIUM BROMIDE AND ALBUTEROL SULFATE 3 ML: 2.5; .5 SOLUTION RESPIRATORY (INHALATION) at 19:43

## 2024-01-20 RX ADMIN — ALBUTEROL SULFATE 2.5 MG: 2.5 SOLUTION RESPIRATORY (INHALATION) at 19:43

## 2024-01-20 RX ADMIN — METHYLPREDNISOLONE SODIUM SUCCINATE 125 MG: 125 INJECTION, POWDER, FOR SOLUTION INTRAMUSCULAR; INTRAVENOUS at 20:17

## 2024-01-21 ENCOUNTER — APPOINTMENT (OUTPATIENT)
Dept: CARDIOLOGY | Facility: HOSPITAL | Age: 74
End: 2024-01-21
Payer: MEDICARE

## 2024-01-21 PROBLEM — D72.829 LEUKOCYTOSIS: Status: ACTIVE | Noted: 2024-01-21

## 2024-01-21 PROBLEM — J43.9 PULMONARY EMPHYSEMA: Status: ACTIVE | Noted: 2024-01-21

## 2024-01-21 PROBLEM — I10 ESSENTIAL HYPERTENSION: Status: ACTIVE | Noted: 2024-01-21

## 2024-01-21 PROBLEM — F10.90 ALCOHOL USE: Status: ACTIVE | Noted: 2024-01-21

## 2024-01-21 PROBLEM — Z78.9 ALCOHOL USE: Status: ACTIVE | Noted: 2024-01-21

## 2024-01-21 PROBLEM — I50.21 ACUTE SYSTOLIC (CONGESTIVE) HEART FAILURE: Status: ACTIVE | Noted: 2024-01-21

## 2024-01-21 PROBLEM — I48.11 LONGSTANDING PERSISTENT ATRIAL FIBRILLATION: Status: ACTIVE | Noted: 2024-01-21

## 2024-01-21 PROBLEM — I50.31 ACUTE DIASTOLIC (CONGESTIVE) HEART FAILURE: Status: ACTIVE | Noted: 2024-01-21

## 2024-01-21 LAB
ANION GAP SERPL CALCULATED.3IONS-SCNC: 14.6 MMOL/L (ref 5–15)
AORTIC DIMENSIONLESS INDEX: 0.6 (DI)
ASCENDING AORTA: 3.1 CM
BASOPHILS # BLD AUTO: 0.01 10*3/MM3 (ref 0–0.2)
BASOPHILS NFR BLD AUTO: 0.1 % (ref 0–1.5)
BH CV ECHO LEFT VENTRICLE GLOBAL LONGITUDINAL STRAIN: -7.7 %
BH CV ECHO MEAS - ACS: 1.78 CM
BH CV ECHO MEAS - AO MAX PG: 9.4 MMHG
BH CV ECHO MEAS - AO MEAN PG: 5.2 MMHG
BH CV ECHO MEAS - AO ROOT DIAM: 3.1 CM
BH CV ECHO MEAS - AO V2 MAX: 153 CM/SEC
BH CV ECHO MEAS - AO V2 VTI: 23.8 CM
BH CV ECHO MEAS - AVA(I,D): 1.62 CM2
BH CV ECHO MEAS - EDV(CUBED): 189.8 ML
BH CV ECHO MEAS - EDV(MOD-SP2): 181 ML
BH CV ECHO MEAS - EDV(MOD-SP4): 125 ML
BH CV ECHO MEAS - EF(MOD-BP): 32.1 %
BH CV ECHO MEAS - EF(MOD-SP2): 28.2 %
BH CV ECHO MEAS - EF(MOD-SP4): 35.2 %
BH CV ECHO MEAS - ESV(CUBED): 93.8 ML
BH CV ECHO MEAS - ESV(MOD-SP2): 130 ML
BH CV ECHO MEAS - ESV(MOD-SP4): 81 ML
BH CV ECHO MEAS - FS: 20.9 %
BH CV ECHO MEAS - IVS/LVPW: 1.12 CM
BH CV ECHO MEAS - IVSD: 1.15 CM
BH CV ECHO MEAS - LAT PEAK E' VEL: 6.7 CM/SEC
BH CV ECHO MEAS - LV DIASTOLIC VOL/BSA (35-75): 65.3 CM2
BH CV ECHO MEAS - LV MASS(C)D: 257.5 GRAMS
BH CV ECHO MEAS - LV MAX PG: 3.1 MMHG
BH CV ECHO MEAS - LV MEAN PG: 1.44 MMHG
BH CV ECHO MEAS - LV SYSTOLIC VOL/BSA (12-30): 42.3 CM2
BH CV ECHO MEAS - LV V1 MAX: 88.6 CM/SEC
BH CV ECHO MEAS - LV V1 VTI: 13.4 CM
BH CV ECHO MEAS - LVIDD: 5.7 CM
BH CV ECHO MEAS - LVIDS: 4.5 CM
BH CV ECHO MEAS - LVOT AREA: 2.9 CM2
BH CV ECHO MEAS - LVOT DIAM: 1.91 CM
BH CV ECHO MEAS - LVPWD: 1.03 CM
BH CV ECHO MEAS - MED PEAK E' VEL: 4.3 CM/SEC
BH CV ECHO MEAS - MR MAX PG: 52.2 MMHG
BH CV ECHO MEAS - MR MAX VEL: 361.3 CM/SEC
BH CV ECHO MEAS - MV DEC SLOPE: 625.3 CM/SEC2
BH CV ECHO MEAS - MV DEC TIME: 0.18 SEC
BH CV ECHO MEAS - MV E MAX VEL: 116 CM/SEC
BH CV ECHO MEAS - MV MAX PG: 5.8 MMHG
BH CV ECHO MEAS - MV MEAN PG: 1.72 MMHG
BH CV ECHO MEAS - MV P1/2T: 60.8 MSEC
BH CV ECHO MEAS - MV V2 VTI: 26 CM
BH CV ECHO MEAS - MVA(P1/2T): 3.6 CM2
BH CV ECHO MEAS - MVA(VTI): 1.48 CM2
BH CV ECHO MEAS - PA ACC TIME: 0.09 SEC
BH CV ECHO MEAS - PA V2 MAX: 94.5 CM/SEC
BH CV ECHO MEAS - PI END-D VEL: 105.7 CM/SEC
BH CV ECHO MEAS - PULM DIAS VEL: 58.5 CM/SEC
BH CV ECHO MEAS - PULM S/D: 0.69
BH CV ECHO MEAS - PULM SYS VEL: 40.5 CM/SEC
BH CV ECHO MEAS - RAP SYSTOLE: 3 MMHG
BH CV ECHO MEAS - RV MAX PG: 1.94 MMHG
BH CV ECHO MEAS - RV V1 MAX: 69.7 CM/SEC
BH CV ECHO MEAS - RV V1 VTI: 12.4 CM
BH CV ECHO MEAS - RVSP: 31 MMHG
BH CV ECHO MEAS - SI(MOD-SP2): 26.6 ML/M2
BH CV ECHO MEAS - SI(MOD-SP4): 23 ML/M2
BH CV ECHO MEAS - SV(LVOT): 38.4 ML
BH CV ECHO MEAS - SV(MOD-SP2): 51 ML
BH CV ECHO MEAS - SV(MOD-SP4): 44 ML
BH CV ECHO MEAS - TR MAX PG: 28 MMHG
BH CV ECHO MEAS - TR MAX VEL: 264.7 CM/SEC
BH CV ECHO MEASUREMENTS AVERAGE E/E' RATIO: 21.09
BH CV ECHO SHUNT ASSESSMENT PERFORMED (HIDDEN SCRIPTING): 1
BH CV XLRA - RV BASE: 4.3 CM
BH CV XLRA - RV LENGTH: 7.5 CM
BH CV XLRA - RV MID: 3.1 CM
BH CV XLRA - TDI S': 7.7 CM/SEC
BUN SERPL-MCNC: 17 MG/DL (ref 8–23)
BUN/CREAT SERPL: 16.8 (ref 7–25)
CALCIUM SPEC-SCNC: 9.1 MG/DL (ref 8.6–10.5)
CHLORIDE SERPL-SCNC: 98 MMOL/L (ref 98–107)
CO2 SERPL-SCNC: 26.4 MMOL/L (ref 22–29)
CREAT SERPL-MCNC: 1.01 MG/DL (ref 0.76–1.27)
DEPRECATED RDW RBC AUTO: 37.5 FL (ref 37–54)
DIGOXIN SERPL-MCNC: 1.6 NG/ML (ref 0.6–1.2)
EGFRCR SERPLBLD CKD-EPI 2021: 78.5 ML/MIN/1.73
EOSINOPHIL # BLD AUTO: 0.01 10*3/MM3 (ref 0–0.4)
EOSINOPHIL NFR BLD AUTO: 0.1 % (ref 0.3–6.2)
ERYTHROCYTE [DISTWIDTH] IN BLOOD BY AUTOMATED COUNT: 11.7 % (ref 12.3–15.4)
FOLATE SERPL-MCNC: >20 NG/ML (ref 4.78–24.2)
GLUCOSE SERPL-MCNC: 236 MG/DL (ref 65–99)
HBA1C MFR BLD: 6.1 % (ref 4.8–5.6)
HCT VFR BLD AUTO: 43 % (ref 37.5–51)
HGB BLD-MCNC: 14.5 G/DL (ref 13–17.7)
IMM GRANULOCYTES # BLD AUTO: 0.07 10*3/MM3 (ref 0–0.05)
IMM GRANULOCYTES NFR BLD AUTO: 0.5 % (ref 0–0.5)
LEFT ATRIUM VOLUME INDEX: 40.5 ML/M2
LYMPHOCYTES # BLD AUTO: 0.54 10*3/MM3 (ref 0.7–3.1)
LYMPHOCYTES NFR BLD AUTO: 3.9 % (ref 19.6–45.3)
MCH RBC QN AUTO: 29.8 PG (ref 26.6–33)
MCHC RBC AUTO-ENTMCNC: 33.7 G/DL (ref 31.5–35.7)
MCV RBC AUTO: 88.5 FL (ref 79–97)
MONOCYTES # BLD AUTO: 0.2 10*3/MM3 (ref 0.1–0.9)
MONOCYTES NFR BLD AUTO: 1.4 % (ref 5–12)
NEUTROPHILS NFR BLD AUTO: 13.04 10*3/MM3 (ref 1.7–7)
NEUTROPHILS NFR BLD AUTO: 94 % (ref 42.7–76)
NRBC BLD AUTO-RTO: 0 /100 WBC (ref 0–0.2)
NT-PROBNP SERPL-MCNC: 3524 PG/ML (ref 0–900)
PLATELET # BLD AUTO: 405 10*3/MM3 (ref 140–450)
PMV BLD AUTO: 10.9 FL (ref 6–12)
POTASSIUM SERPL-SCNC: 4.1 MMOL/L (ref 3.5–5.2)
QT INTERVAL: 340 MS
QTC INTERVAL: 487 MS
RBC # BLD AUTO: 4.86 10*6/MM3 (ref 4.14–5.8)
SINUS: 3.1 CM
SODIUM SERPL-SCNC: 139 MMOL/L (ref 136–145)
STJ: 2.7 CM
URATE SERPL-MCNC: 5.8 MG/DL (ref 3.4–7)
WBC NRBC COR # BLD AUTO: 13.87 10*3/MM3 (ref 3.4–10.8)

## 2024-01-21 PROCEDURE — 80162 ASSAY OF DIGOXIN TOTAL: CPT | Performed by: INTERNAL MEDICINE

## 2024-01-21 PROCEDURE — 94761 N-INVAS EAR/PLS OXIMETRY MLT: CPT

## 2024-01-21 PROCEDURE — 83036 HEMOGLOBIN GLYCOSYLATED A1C: CPT | Performed by: INTERNAL MEDICINE

## 2024-01-21 PROCEDURE — 93306 TTE W/DOPPLER COMPLETE: CPT | Performed by: INTERNAL MEDICINE

## 2024-01-21 PROCEDURE — 84550 ASSAY OF BLOOD/URIC ACID: CPT | Performed by: INTERNAL MEDICINE

## 2024-01-21 PROCEDURE — 93356 MYOCRD STRAIN IMG SPCKL TRCK: CPT

## 2024-01-21 PROCEDURE — 25510000001 PERFLUTREN (DEFINITY) 8.476 MG IN SODIUM CHLORIDE (PF) 0.9 % 10 ML INJECTION: Performed by: INTERNAL MEDICINE

## 2024-01-21 PROCEDURE — 25010000002 FUROSEMIDE PER 20 MG: Performed by: INTERNAL MEDICINE

## 2024-01-21 PROCEDURE — 93356 MYOCRD STRAIN IMG SPCKL TRCK: CPT | Performed by: INTERNAL MEDICINE

## 2024-01-21 PROCEDURE — 82746 ASSAY OF FOLIC ACID SERUM: CPT | Performed by: INTERNAL MEDICINE

## 2024-01-21 PROCEDURE — 94799 UNLISTED PULMONARY SVC/PX: CPT

## 2024-01-21 PROCEDURE — 99221 1ST HOSP IP/OBS SF/LOW 40: CPT | Performed by: INTERNAL MEDICINE

## 2024-01-21 PROCEDURE — 80048 BASIC METABOLIC PNL TOTAL CA: CPT | Performed by: INTERNAL MEDICINE

## 2024-01-21 PROCEDURE — 93306 TTE W/DOPPLER COMPLETE: CPT

## 2024-01-21 PROCEDURE — 83880 ASSAY OF NATRIURETIC PEPTIDE: CPT | Performed by: INTERNAL MEDICINE

## 2024-01-21 PROCEDURE — 94760 N-INVAS EAR/PLS OXIMETRY 1: CPT

## 2024-01-21 PROCEDURE — 85025 COMPLETE CBC W/AUTO DIFF WBC: CPT | Performed by: INTERNAL MEDICINE

## 2024-01-21 PROCEDURE — 94640 AIRWAY INHALATION TREATMENT: CPT

## 2024-01-21 RX ORDER — METHOCARBAMOL 750 MG/1
750 TABLET, FILM COATED ORAL 3 TIMES DAILY
Status: DISCONTINUED | OUTPATIENT
Start: 2024-01-21 | End: 2024-01-24 | Stop reason: HOSPADM

## 2024-01-21 RX ORDER — BUDESONIDE AND FORMOTEROL FUMARATE DIHYDRATE 160; 4.5 UG/1; UG/1
2 AEROSOL RESPIRATORY (INHALATION)
Status: DISCONTINUED | OUTPATIENT
Start: 2024-01-21 | End: 2024-01-22

## 2024-01-21 RX ORDER — BUPROPION HYDROCHLORIDE 300 MG/1
300 TABLET ORAL DAILY
Status: DISCONTINUED | OUTPATIENT
Start: 2024-01-21 | End: 2024-01-24 | Stop reason: HOSPADM

## 2024-01-21 RX ORDER — LISINOPRIL 5 MG/1
5 TABLET ORAL
Status: DISCONTINUED | OUTPATIENT
Start: 2024-01-21 | End: 2024-01-22

## 2024-01-21 RX ORDER — IPRATROPIUM BROMIDE AND ALBUTEROL SULFATE 2.5; .5 MG/3ML; MG/3ML
3 SOLUTION RESPIRATORY (INHALATION) EVERY 6 HOURS PRN
Status: DISCONTINUED | OUTPATIENT
Start: 2024-01-21 | End: 2024-01-24 | Stop reason: HOSPADM

## 2024-01-21 RX ORDER — LORAZEPAM 1 MG/1
1 TABLET ORAL EVERY 6 HOURS PRN
Status: DISCONTINUED | OUTPATIENT
Start: 2024-01-21 | End: 2024-01-24 | Stop reason: HOSPADM

## 2024-01-21 RX ORDER — DIGOXIN 250 MCG
250 TABLET ORAL DAILY
Status: DISCONTINUED | OUTPATIENT
Start: 2024-01-21 | End: 2024-01-22

## 2024-01-21 RX ORDER — FUROSEMIDE 40 MG/1
40 TABLET ORAL
Status: DISCONTINUED | OUTPATIENT
Start: 2024-01-21 | End: 2024-01-22

## 2024-01-21 RX ORDER — CARVEDILOL 6.25 MG/1
6.25 TABLET ORAL 2 TIMES DAILY WITH MEALS
Status: DISCONTINUED | OUTPATIENT
Start: 2024-01-21 | End: 2024-01-24 | Stop reason: HOSPADM

## 2024-01-21 RX ORDER — DIPHENOXYLATE HYDROCHLORIDE AND ATROPINE SULFATE 2.5; .025 MG/1; MG/1
1 TABLET ORAL DAILY
Status: DISCONTINUED | OUTPATIENT
Start: 2024-01-21 | End: 2024-01-24 | Stop reason: HOSPADM

## 2024-01-21 RX ORDER — DILTIAZEM HYDROCHLORIDE 120 MG/1
120 CAPSULE, COATED, EXTENDED RELEASE ORAL
Status: DISCONTINUED | OUTPATIENT
Start: 2024-01-21 | End: 2024-01-21

## 2024-01-21 RX ORDER — FLUTICASONE PROPIONATE 50 MCG
1 SPRAY, SUSPENSION (ML) NASAL DAILY
Status: DISCONTINUED | OUTPATIENT
Start: 2024-01-21 | End: 2024-01-24 | Stop reason: HOSPADM

## 2024-01-21 RX ADMIN — PERFLUTREN 2 ML: 6.52 INJECTION, SUSPENSION INTRAVENOUS at 08:58

## 2024-01-21 RX ADMIN — RIVAROXABAN 20 MG: 20 TABLET, FILM COATED ORAL at 09:19

## 2024-01-21 RX ADMIN — Medication 10 ML: at 00:02

## 2024-01-21 RX ADMIN — BUPROPION HYDROCHLORIDE 300 MG: 300 TABLET, EXTENDED RELEASE ORAL at 09:18

## 2024-01-21 RX ADMIN — METHOCARBAMOL TABLETS 750 MG: 750 TABLET, COATED ORAL at 21:07

## 2024-01-21 RX ADMIN — NITROGLYCERIN 1 INCH: 20 OINTMENT TOPICAL at 14:25

## 2024-01-21 RX ADMIN — METHOCARBAMOL TABLETS 750 MG: 750 TABLET, COATED ORAL at 10:23

## 2024-01-21 RX ADMIN — FUROSEMIDE 40 MG: 40 TABLET ORAL at 18:08

## 2024-01-21 RX ADMIN — DIGOXIN 250 MCG: 250 TABLET ORAL at 09:18

## 2024-01-21 RX ADMIN — Medication 5 MG: at 00:03

## 2024-01-21 RX ADMIN — Medication 10 ML: at 21:07

## 2024-01-21 RX ADMIN — Medication 1 TABLET: at 13:39

## 2024-01-21 RX ADMIN — FLUTICASONE PROPIONATE 1 SPRAY: 50 SPRAY, METERED NASAL at 09:18

## 2024-01-21 RX ADMIN — METHOCARBAMOL TABLETS 750 MG: 750 TABLET, COATED ORAL at 16:08

## 2024-01-21 RX ADMIN — FUROSEMIDE 40 MG: 10 INJECTION, SOLUTION INTRAMUSCULAR; INTRAVENOUS at 09:18

## 2024-01-21 RX ADMIN — LISINOPRIL 5 MG: 5 TABLET ORAL at 12:45

## 2024-01-21 RX ADMIN — DILTIAZEM HYDROCHLORIDE 120 MG: 120 CAPSULE, EXTENDED RELEASE ORAL at 10:46

## 2024-01-21 RX ADMIN — Medication 10 ML: at 09:18

## 2024-01-21 RX ADMIN — CARVEDILOL 6.25 MG: 6.25 TABLET, FILM COATED ORAL at 18:08

## 2024-01-21 RX ADMIN — Medication 100 MG: at 13:39

## 2024-01-21 RX ADMIN — BUDESONIDE AND FORMOTEROL FUMARATE DIHYDRATE 2 PUFF: 160; 4.5 AEROSOL RESPIRATORY (INHALATION) at 20:01

## 2024-01-21 NOTE — ED PROVIDER NOTES
EMERGENCY DEPARTMENT ENCOUNTER    Room Number:  E454/1  PCP: Provider, No Known    HPI:  Chief Complaint: Shortness of breath     A complete HPI/ROS/PMH/PSH/SH/FH are unobtainable due to: Nothing  Context: Jered Morejon is a 73 y.o. male with a significant past medical history of alcohol abuse, CHF, A-fib who presents to the ED c/o acute shortness of breath that has been ongoing for the past 8-10 days and progressively getting worse.  Patient states he was recently seen at a [facility] facility and diagnosed with pneumonia.  Patient received IV antibiotics IV diltiazem.  Patient states symptoms have only gotten worse since his visit.  He states his symptoms are exacerbated when lying down and with slight exertion.  He states he has had a cough and has had viral-like symptoms a month prior to the onset of shortness of breath.  He denies nausea, vomiting, diarrhea, fever, chills, chest pain.  Patient is on digoxin and Xarelto chronically.  He is here for further evaluation      Reviewing the patient's chart he was seen by Dr. Soren Gomez with local cardiology on 1/12/2024.  This is apparently a follow-up for chronic A-fib and moderate alcohol intake.  Patient was noted to have positive JVD and hepatojugular reflex consistent with acute on chronic diastolic heart failure.  Chest x-ray was recommended and IV diuretics.  Patient was sent to the [facility] ED for further evaluation.  And discharged with Keflex twice daily    PAST MEDICAL HISTORY  Active Ambulatory Problems     Diagnosis Date Noted    Myocardiopathy 03/01/2019     Resolved Ambulatory Problems     Diagnosis Date Noted    No Resolved Ambulatory Problems     Past Medical History:   Diagnosis Date    Atrial fibrillation     Hypertension     Renal infarct          PAST SURGICAL HISTORY  Past Surgical History:   Procedure Laterality Date    CARDIAC CATHETERIZATION      CARDIOVERSION           FAMILY HISTORY  Family History   Problem  Relation Age of Onset    No Known Problems Mother     No Known Problems Father     No Known Problems Sister     No Known Problems Brother     No Known Problems Maternal Aunt     No Known Problems Maternal Uncle     No Known Problems Paternal Aunt     No Known Problems Paternal Uncle     No Known Problems Maternal Grandmother     No Known Problems Maternal Grandfather     No Known Problems Paternal Grandmother     No Known Problems Paternal Grandfather          SOCIAL HISTORY  Social History     Socioeconomic History    Marital status: Single   Tobacco Use    Smoking status: Former     Packs/day: 2.00     Years: 45.00     Additional pack years: 0.00     Total pack years: 90.00     Types: Cigarettes     Quit date: 2012     Years since quittin.0    Smokeless tobacco: Never    Tobacco comments:     caffeine use   Vaping Use    Vaping Use: Never used   Substance and Sexual Activity    Alcohol use: Yes     Comment: 3 drinks a day    Drug use: No    Sexual activity: Defer         ALLERGIES  Patient has no known allergies.        REVIEW OF SYSTEMS  Review of Systems     Included in HPI  All systems reviewed and negative except for those discussed in HPI.       PHYSICAL EXAM  ED Triage Vitals   Temp Heart Rate Resp BP SpO2   24 1900 24 1900 24 1900 24 1900 24 1900   98.1 °F (36.7 °C) (!) 130 24 138/88 97 %      Temp src Heart Rate Source Patient Position BP Location FiO2 (%)   -- 24 -- -- --    Monitor          Physical Exam      GENERAL: Chronically ill appearance, mild distress   HENT: nares patent  EYES: no scleral icterus  CV: regular rhythm, normal rate, JVD noted, bilateral pedal edema noted  RESPIRATORY: Patient is slightly tachypneic requiring 2 L O2 NC  ABDOMEN: soft nontender, hepatojugular reflex present.  MUSCULOSKELETAL: no deformity  NEURO: alert, moves all extremities, follows commands  PSYCH:  calm, cooperative  SKIN: warm, dry    Vital signs and nursing  notes reviewed.          LAB RESULTS  Recent Results (from the past 24 hour(s))   ECG 12 Lead Dyspnea    Collection Time: 01/20/24  7:19 PM   Result Value Ref Range    QT Interval 340 ms    QTC Interval 487 ms   Comprehensive Metabolic Panel    Collection Time: 01/20/24  7:34 PM    Specimen: Blood   Result Value Ref Range    Glucose 158 (H) 65 - 99 mg/dL    BUN 15 8 - 23 mg/dL    Creatinine 0.96 0.76 - 1.27 mg/dL    Sodium 137 136 - 145 mmol/L    Potassium 4.8 3.5 - 5.2 mmol/L    Chloride 101 98 - 107 mmol/L    CO2 24.1 22.0 - 29.0 mmol/L    Calcium 9.2 8.6 - 10.5 mg/dL    Total Protein 7.0 6.0 - 8.5 g/dL    Albumin 3.4 (L) 3.5 - 5.2 g/dL    ALT (SGPT) 27 1 - 41 U/L    AST (SGOT) 18 1 - 40 U/L    Alkaline Phosphatase 73 39 - 117 U/L    Total Bilirubin 0.6 0.0 - 1.2 mg/dL    Globulin 3.6 gm/dL    A/G Ratio 0.9 g/dL    BUN/Creatinine Ratio 15.6 7.0 - 25.0    Anion Gap 11.9 5.0 - 15.0 mmol/L    eGFR 83.5 >60.0 mL/min/1.73   Lactic Acid, Plasma    Collection Time: 01/20/24  7:34 PM    Specimen: Blood   Result Value Ref Range    Lactate 1.4 0.5 - 2.0 mmol/L   High Sensitivity Troponin T    Collection Time: 01/20/24  7:34 PM    Specimen: Blood   Result Value Ref Range    HS Troponin T 35 (H) <22 ng/L   CBC Auto Differential    Collection Time: 01/20/24  7:34 PM    Specimen: Blood   Result Value Ref Range    WBC 18.55 (H) 3.40 - 10.80 10*3/mm3    RBC 4.72 4.14 - 5.80 10*6/mm3    Hemoglobin 14.1 13.0 - 17.7 g/dL    Hematocrit 43.0 37.5 - 51.0 %    MCV 91.1 79.0 - 97.0 fL    MCH 29.9 26.6 - 33.0 pg    MCHC 32.8 31.5 - 35.7 g/dL    RDW 12.1 (L) 12.3 - 15.4 %    RDW-SD 39.5 37.0 - 54.0 fl    MPV 10.5 6.0 - 12.0 fL    Platelets 457 (H) 140 - 450 10*3/mm3    Neutrophil % 82.2 (H) 42.7 - 76.0 %    Lymphocyte % 8.5 (L) 19.6 - 45.3 %    Monocyte % 7.9 5.0 - 12.0 %    Eosinophil % 0.5 0.3 - 6.2 %    Basophil % 0.3 0.0 - 1.5 %    Immature Grans % 0.6 (H) 0.0 - 0.5 %    Neutrophils, Absolute 15.23 (H) 1.70 - 7.00 10*3/mm3     Lymphocytes, Absolute 1.58 0.70 - 3.10 10*3/mm3    Monocytes, Absolute 1.47 (H) 0.10 - 0.90 10*3/mm3    Eosinophils, Absolute 0.09 0.00 - 0.40 10*3/mm3    Basophils, Absolute 0.06 0.00 - 0.20 10*3/mm3    Immature Grans, Absolute 0.12 (H) 0.00 - 0.05 10*3/mm3    nRBC 0.1 0.0 - 0.2 /100 WBC   Green Top (Gel)    Collection Time: 01/20/24  7:34 PM   Result Value Ref Range    Extra Tube Hold for add-ons.    Lavender Top    Collection Time: 01/20/24  7:34 PM   Result Value Ref Range    Extra Tube hold for add-on    Gold Top - SST    Collection Time: 01/20/24  7:34 PM   Result Value Ref Range    Extra Tube Hold for add-ons.    Light Blue Top    Collection Time: 01/20/24  7:34 PM   Result Value Ref Range    Extra Tube Hold for add-ons.    Procalcitonin    Collection Time: 01/20/24  7:34 PM    Specimen: Blood   Result Value Ref Range    Procalcitonin 0.07 0.00 - 0.25 ng/mL   Respiratory Panel PCR w/COVID-19(SARS-CoV-2) GERONIMO/TRA/MU/PAD/COR/MEGAN In-House, NP Swab in [facility], 2 HR TAT - Swab, Nasopharynx    Collection Time: 01/20/24  7:48 PM    Specimen: Nasopharynx; Swab   Result Value Ref Range    ADENOVIRUS, PCR Not Detected Not Detected    Coronavirus 229E Not Detected Not Detected    Coronavirus HKU1 Not Detected Not Detected    Coronavirus NL63 Not Detected Not Detected    Coronavirus OC43 Not Detected Not Detected    COVID19 Not Detected Not Detected - Ref. Range    Human Metapneumovirus Not Detected Not Detected    Human Rhinovirus/Enterovirus Not Detected Not Detected    Influenza A PCR Not Detected Not Detected    Influenza B PCR Not Detected Not Detected    Parainfluenza Virus 1 Not Detected Not Detected    Parainfluenza Virus 2 Not Detected Not Detected    Parainfluenza Virus 3 Not Detected Not Detected    Parainfluenza Virus 4 Not Detected Not Detected    RSV, PCR Not Detected Not Detected    Bordetella pertussis pcr Not Detected Not Detected    Bordetella parapertussis PCR Not Detected Not Detected    Chlamydophila  pneumoniae PCR Not Detected Not Detected    Mycoplasma pneumo by PCR Not Detected Not Detected   High Sensitivity Troponin T 2Hr    Collection Time: 01/20/24  9:37 PM    Specimen: Blood   Result Value Ref Range    HS Troponin T 38 (H) <22 ng/L    Troponin T Delta 3 >=-4 - <+4 ng/L   Uric Acid    Collection Time: 01/20/24  9:37 PM    Specimen: Blood   Result Value Ref Range    Uric Acid 5.8 3.4 - 7.0 mg/dL       Ordered the above labs and reviewed the results.        RADIOLOGY  XR Chest 1 View    Result Date: 1/20/2024  SINGLE VIEW OF THE CHEST  HISTORY: Shortness of breath  COMPARISON: January 12, 2024  FINDINGS: The heart is enlarged. There are bilateral pulmonary opacities. These have worsened when compared to prior study. No pneumothorax is seen. No large effusion is identified. There is calcification of the aorta.      Worsening bilateral pulmonary opacities.  This report was finalized on 1/20/2024 8:35 PM by Dr. Courtney Gold M.D on Workstation: [id number]       Ordered the above noted radiological studies. Reviewed by me in PACS.        MEDICATIONS GIVEN IN ER  Medications   sodium chloride 0.9 % flush 10 mL (has no administration in time range)   sodium chloride 0.9 % flush 10 mL (10 mL Intravenous Given 1/21/24 0002)   sodium chloride 0.9 % flush 10 mL (has no administration in time range)   sodium chloride 0.9 % infusion 40 mL (has no administration in time range)   sennosides-docusate (PERICOLACE) 8.6-50 MG per tablet 2 tablet (has no administration in time range)     And   polyethylene glycol (MIRALAX) packet 17 g (has no administration in time range)     And   bisacodyl (DULCOLAX) EC tablet 5 mg (has no administration in time range)     And   bisacodyl (DULCOLAX) suppository 10 mg (has no administration in time range)   nitroglycerin (NITROSTAT) SL tablet 0.4 mg (has no administration in time range)   acetaminophen (TYLENOL) tablet 650 mg (has no administration in time range)     Or   acetaminophen  (TYLENOL) 160 MG/5ML oral solution 650 mg (has no administration in time range)     Or   acetaminophen (TYLENOL) suppository 650 mg (has no administration in time range)   melatonin tablet 5 mg (5 mg Oral Given 1/21/24 0003)   ondansetron ODT (ZOFRAN-ODT) disintegrating tablet 4 mg (has no administration in time range)     Or   ondansetron (ZOFRAN) injection 4 mg (has no administration in time range)   calcium carbonate (TUMS) chewable tablet 500 mg (200 mg elemental) (has no administration in time range)   furosemide (LASIX) injection 40 mg (has no administration in time range)   buPROPion XL (WELLBUTRIN XL) 24 hr tablet 300 mg (has no administration in time range)   digoxin (LANOXIN) tablet 250 mcg (has no administration in time range)   dilTIAZem CD (CARDIZEM CD) 24 hr capsule 120 mg (has no administration in time range)   fluticasone (FLONASE) 50 MCG/ACT nasal spray 1 spray (has no administration in time range)   rivaroxaban (XARELTO) tablet 20 mg (has no administration in time range)   methocarbamol (ROBAXIN) tablet 750 mg (has no administration in time range)   ipratropium-albuterol (DUO-NEB) nebulizer solution 3 mL (3 mL Nebulization Given 1/20/24 1943)   albuterol (PROVENTIL) nebulizer solution 0.083% 2.5 mg/3mL (2.5 mg Nebulization Given 1/20/24 1956)   methylPREDNISolone sodium succinate (SOLU-Medrol) injection 125 mg (125 mg Intravenous Given 1/20/24 2017)   furosemide (LASIX) injection 80 mg (80 mg Intravenous Given 1/20/24 2022)   digoxin (LANOXIN) injection 250 mcg (250 mcg Intravenous Given 1/20/24 2028)   acetaminophen (TYLENOL) tablet 1,000 mg (1,000 mg Oral Given 1/20/24 2043)   piperacillin-tazobactam (ZOSYN) 3.375 g in iso-osmotic dextrose 50 ml (premix) (3.375 g Intravenous New Bag 1/20/24 2131)         ORDERS PLACED DURING THIS VISIT:  Orders Placed This Encounter   Procedures    Blood Culture - Blood,    Blood Culture - Blood,    Respiratory Panel PCR w/COVID-19(SARS-CoV-2)  GERONIMO/TRA/MU/PAD/COR/MEGAN In-House, NP Swab in UTM/VTM, 2 HR TAT - Swab, Nasopharynx    XR Chest 1 View    Comprehensive Metabolic Panel    Lactic Acid, Plasma    Point Clear Draw    High Sensitivity Troponin T    CBC Auto Differential    High Sensitivity Troponin T 2Hr    Procalcitonin    Basic Metabolic Panel    CBC Auto Differential    Uric Acid    Uric Acid    Diet: Cardiac Diets; Healthy Heart (2-3 Na+); Texture: Regular Texture (IDDSI 7); Fluid Consistency: Thin (IDDSI 0)    Undress & Gown    Continuous Pulse Oximetry    Vital Signs    Vital Signs    Intake & Output    Weigh Patient    Oral Care    Telemetry - Maintain IV Access    Telemetry - Place Orders & Notify Provider of Results When Patient Experiences Acute Chest Pain, Dysrhythmia or Respiratory Distress    May Be Off Telemetry for Tests    Up With Assistance    Notify Provider (With Default Parameters)    Strict Intake & Output    Daily Weights    Code Status and Medical Interventions:    LHA (on-call MD unless specified) Details    Inpatient Case Management  Consult    OT Consult: Eval & Treat    PT Consult: Eval & Treat As Tolerated; Functional Mobility Below Baseline    Oxygen Therapy- Nasal Cannula; Titrate 1-6 LPM Per SpO2; 90 - 95%    Incentive Spirometry    ECG 12 Lead Dyspnea    Adult Transthoracic Echo Complete W/ Cont if Necessary Per Protocol    Insert Peripheral IV    Insert Peripheral IV    Inpatient Admission    CBC & Differential    Green Top (Gel)    Lavender Top    Gold Top - SST    Light Blue Top    CBC & Differential         OUTPATIENT MEDICATION MANAGEMENT:  Current Facility-Administered Medications Ordered in Epic   Medication Dose Route Frequency Provider Last Rate Last Admin    acetaminophen (TYLENOL) tablet 650 mg  650 mg Oral Q4H PRN Steven Pinzon MD        Or    acetaminophen (TYLENOL) 160 MG/5ML oral solution 650 mg  650 mg Oral Q4H PRN Steven Pinzon MD        Or    acetaminophen (TYLENOL) suppository 650  mg  650 mg Rectal Q4H PRN Steven Pinzon MD        sennosides-docusate (PERICOLACE) 8.6-50 MG per tablet 2 tablet  2 tablet Oral BID PRN Steven Pinzon MD        And    polyethylene glycol (MIRALAX) packet 17 g  17 g Oral Daily PRN Steven Pinzon MD        And    bisacodyl (DULCOLAX) EC tablet 5 mg  5 mg Oral Daily PRN Steven Pinzon MD        And    bisacodyl (DULCOLAX) suppository 10 mg  10 mg Rectal Daily PRN Steven Pinzon MD        buPROPion XL (WELLBUTRIN XL) 24 hr tablet 300 mg  300 mg Oral Daily Steven Pinzon MD        calcium carbonate (TUMS) chewable tablet 500 mg (200 mg elemental)  2 tablet Oral TID PRN Steven Pinzon MD        digoxin (LANOXIN) tablet 250 mcg  250 mcg Oral Daily Steven Pinzon MD        dilTIAZem CD (CARDIZEM CD) 24 hr capsule 120 mg  120 mg Oral Q24H Steven Pinzon MD        fluticasone (FLONASE) 50 MCG/ACT nasal spray 1 spray  1 spray Nasal Daily Steven Pinzon MD        furosemide (LASIX) injection 40 mg  40 mg Intravenous Q12H Steven Pinzon MD        melatonin tablet 5 mg  5 mg Oral Nightly PRN Steven Pinzon MD   5 mg at 01/21/24 0003    methocarbamol (ROBAXIN) tablet 750 mg  750 mg Oral TID Steven Pinzon MD        nitroglycerin (NITROSTAT) SL tablet 0.4 mg  0.4 mg Sublingual Q5 Min PRN Steven Pinzon MD        ondansetron ODT (ZOFRAN-ODT) disintegrating tablet 4 mg  4 mg Oral Q6H PRN Steven Pinzon MD        Or    ondansetron (ZOFRAN) injection 4 mg  4 mg Intravenous Q6H PRN Steven Pinzon MD        rivaroxaban (XARELTO) tablet 20 mg  20 mg Oral Daily Steven Pinzon MD        sodium chloride 0.9 % flush 10 mL  10 mL Intravenous PRN Niranjan Sousa MD        sodium chloride 0.9 % flush 10 mL  10 mL Intravenous Q12H Steven Pinzon MD   10 mL at 01/21/24 0002    sodium chloride 0.9 % flush 10 mL  10 mL Intravenous PRN Steven Pinzon MD        sodium chloride 0.9 % infusion 40 mL  40 mL Intravenous PRN  Steven Pinzon MD         No current [facility]-ordered outpatient medications on file.       PROCEDURES  Procedures          MEDICAL DECISION MAKING, PROGRESS, and CONSULTS    Discussion below represents my analysis of pertinent findings related to patient's condition, differential diagnosis, treatment plan and final disposition.      Additional sources:  - Discussed/obtained information from independent historians: None available  Additional information was obtained to confirm the patient's history.    - External (non-ED) record review: As above    Prescription drug monitoring program review:           Chronic or social conditions impacting care: Patient's poor story        Differential diagnosis:    CHF, acquired PNA, aspiration PNA, COVID-19, other viral bronchitis, ACS.               Independent interpretation of labs, radiology studies, and discussions with consultants:  ED Course as of 01/21/24 0410   Sat Jan 20, 2024 1927 First Look: Patient presents via EMS for shortness of breath.  States he was diagnosed with pneumonia a week ago Friday at the [facility] ED.  He has been progressively getting more short of breath, much worse today, having to sleep upright.  Stopped smoking 12 years ago.  Denies any history of CHF or any known history of COPD or asthma. He denies chest pain. He has wheezes and rhonchi bilaterally. [KA]   2036 XR Chest 1 View  My independent interpretation of the chest x-ray is volume overload, patchy infiltrates in the upper and lower lobes [TR]   2036 EKG          EKG time: 1919  Rhythm/Rate: A-fib with RVR, rate 123  P waves and VT: Absent  QRS, axis: Right bundle branch block  ST and T waves: No acute    Independently Interpreted by me  Not significantly changed compared to prior 1/12/2024   [TR]   2050 Heart Rate: 109 [TR]   2108 WBC(!): 18.55 [TR]   2108 Lactate: 1.4 [TR]   2114 Discussing with Dr. Pinzon with [facility].  He agrees to admit.  The patient does have an elevated  white blood cell count and infiltrates however I think in the end this is going to be related to steroids and volume overload rather than infectious cause.  It is difficult to discern at this time.  He is an alcoholic and so is at risk of atypical infection.  Plan to give him a dose of Zosyn until we can determine whether this is infectious versus volume. [TR]   2115 The patient is requiring supplemental oxygen which is new. [TR]      ED Course User Index  [KA] Krista Schmidt PA-C  [TR] Niranjan Sousa MD                 DIAGNOSIS  Final diagnoses:   Atrial fibrillation with RVR   Acute congestive heart failure, unspecified heart failure type   Hypoxia         DISPOSITION  Admission      Latest Documented Vital Signs:  As of 04:10 EST  BP- 136/65 HR- 84 Temp- 97.5 °F (36.4 °C) (Oral) O2 sat- 95%      --    Please note that portions of this were completed with a voice recognition program.       Note Disclaimer: At [facility], we believe that sharing information builds trust and better relationships. You are receiving this note because you are receiving care at [facility] or recently visited. It is possible you will see health information before a provider has talked with you about it. This kind of information can be easy to misunderstand. To help you fully understand what it means for your health, we urge you to discuss this note with your provider.         Butch Carranza III, PA  01/21/24 3085

## 2024-01-21 NOTE — H&P
Patient Name:  Jered Morejon  YOB: 1950  MRN:  [id number]  Admit Date:  2024  Patient Care Team:  Provider, No Known as PCP - General      Subjective   History Present Illness     Chief Complaint   Patient presents with    Shortness of Breath       Mr. Morejon is a 73 y.o. former smoker with a history of HTN, cardiomyopathy with recovered ejection fraction, longstanding persistent afib, COPD, and alcohol use that presents to [facility] complaining of worsening exertional dyspnea and orthopnea for the past few weeks. He has additionally had a minimally productive cough. He visited his cardiologist on 24 with these complaints and he was thought to have been volume overloaded. He was sent to [facility] and they thought he had pneumonia and discharged him on antibiotics. His symptoms continued to worsen which prompted this visit.     Review of Systems   All other systems reviewed and are negative.       Personal History     Past Medical History:   Diagnosis Date    Atrial fibrillation     Hypertension     Renal infarct      Past Surgical History:   Procedure Laterality Date    CARDIAC CATHETERIZATION      CARDIOVERSION       Family History   Problem Relation Age of Onset    No Known Problems Mother     No Known Problems Father     No Known Problems Sister     No Known Problems Brother     No Known Problems Maternal Aunt     No Known Problems Maternal Uncle     No Known Problems Paternal Aunt     No Known Problems Paternal Uncle     No Known Problems Maternal Grandmother     No Known Problems Maternal Grandfather     No Known Problems Paternal Grandmother     No Known Problems Paternal Grandfather      Social History     Tobacco Use    Smoking status: Former     Packs/day: 2.00     Years: 45.00     Additional pack years: 0.00     Total pack years: 90.00     Types: Cigarettes     Quit date: 2012     Years since quittin.0    Smokeless tobacco: Never     Tobacco comments:     caffeine use   Vaping Use    Vaping Use: Never used   Substance Use Topics    Alcohol use: Yes     Comment: 3 drinks a day    Drug use: No     No current facility-administered medications on file prior to encounter.     Current Outpatient Medications on File Prior to Encounter   Medication Sig Dispense Refill    buPROPion XL (WELLBUTRIN XL) 300 MG 24 hr tablet Take 1 tablet by mouth Daily.      cephalexin (KEFLEX) 500 MG capsule Take 1 capsule by mouth 2 (Two) Times a Day for 10 days. 20 capsule 0    digoxin (LANOXIN) 250 MCG tablet TAKE 1 TABLET DAILY 90 tablet 3    dilTIAZem XR (DILACOR XR) 120 MG 24 hr capsule Take 1 capsule by mouth Daily. 90 capsule 3    fluticasone (FLONASE) 50 MCG/ACT nasal spray 1 spray into the nostril(s) as directed by provider Daily.      lisinopril (PRINIVIL,ZESTRIL) 2.5 MG tablet TAKE 1 TABLET DAILY 90 tablet 3    methocarbamol (ROBAXIN) 750 MG tablet Take 1 tablet by mouth 3 (Three) Times a Day.      Xarelto 20 MG tablet TAKE 1 TABLET DAILY 90 tablet 3    amoxicillin-clavulanate (AUGMENTIN) 875-125 MG per tablet Take 1 tablet by mouth Every 12 (Twelve) Hours. (Patient not taking: Reported on 1/20/2024)      doxycycline (VIBRAMYCIN) 100 MG capsule Take 1 capsule by mouth Every 12 (Twelve) Hours. (Patient not taking: Reported on 1/20/2024)      nebivolol (BYSTOLIC) 5 MG tablet Take 1 tablet by mouth Daily. (Patient not taking: Reported on 1/20/2024)       No Known Allergies    Objective    Objective     Vital Signs  Temp:  [98.1 °F (36.7 °C)] 98.1 °F (36.7 °C)  Heart Rate:  [] 58  Resp:  [20-24] 20  BP: (138-157)/(88-99) 157/88  SpO2:  [95 %-99 %] 98 %  on  Flow (L/min):  [2] 2;   Device (Oxygen Therapy): nasal cannula  Body mass index is 27.44 kg/m².    Physical Exam  Vitals and nursing note reviewed.   Constitutional:       General: He is not in acute distress.     Appearance: He is not toxic-appearing or diaphoretic.   HENT:      Head: Normocephalic and  atraumatic.      Nose: Nose normal.      Mouth/Throat:      Mouth: Mucous membranes are moist.      Pharynx: Oropharynx is clear.   Eyes:      Conjunctiva/sclera: Conjunctivae normal.      Pupils: Pupils are equal, round, and reactive to light.   Cardiovascular:      Rate and Rhythm: Normal rate. Rhythm irregular.      Pulses: Normal pulses.   Pulmonary:      Effort: Pulmonary effort is normal.      Breath sounds: Rales present.   Abdominal:      General: Bowel sounds are normal.      Palpations: Abdomen is soft.      Tenderness: There is no abdominal tenderness.   Musculoskeletal:         General: Swelling (2+ BLE) present. No tenderness.      Cervical back: Neck supple.   Skin:     General: Skin is warm and dry.      Capillary Refill: Capillary refill takes less than 2 seconds.   Neurological:      General: No focal deficit present.      Mental Status: He is alert and oriented to person, place, and time.   Psychiatric:         Mood and Affect: Mood normal.         Behavior: Behavior normal.       Results Review:  I reviewed the patient's new clinical results.  I reviewed the patient's new imaging results and agree with the interpretation.  I reviewed the patient's other test results and agree with the interpretation  I personally viewed and interpreted the patient's EKG/Telemetry data  Discussed with ED provider.    Lab Results (last 24 hours)       Procedure Component Value Units Date/Time    CBC & Differential [[id number]]  (Abnormal) Collected: 01/20/24 1934    Specimen: Blood Updated: 01/20/24 1947    Narrative:      The following orders were created for panel order CBC & Differential.  Procedure                               Abnormality         Status                     ---------                               -----------         ------                     CBC Auto Differential[[id number]]        Abnormal            Final result                 Please view results for these tests on the individual orders.     Comprehensive Metabolic Panel [[id number]]  (Abnormal) Collected: 01/20/24 1934    Specimen: Blood Updated: 01/20/24 2007     Glucose 158 mg/dL      BUN 15 mg/dL      Creatinine 0.96 mg/dL      Sodium 137 mmol/L      Potassium 4.8 mmol/L      Chloride 101 mmol/L      CO2 24.1 mmol/L      Calcium 9.2 mg/dL      Total Protein 7.0 g/dL      Albumin 3.4 g/dL      ALT (SGPT) 27 U/L      AST (SGOT) 18 U/L      Alkaline Phosphatase 73 U/L      Total Bilirubin 0.6 mg/dL      Globulin 3.6 gm/dL      A/G Ratio 0.9 g/dL      BUN/Creatinine Ratio 15.6     Anion Gap 11.9 mmol/L      eGFR 83.5 mL/min/1.73     Narrative:      GFR Normal >60  Chronic Kidney Disease <60  Kidney Failure <15    The GFR formula is only valid for adults with stable renal function between ages 18 and 70.    Lactic Acid, Plasma [[id number]]  (Normal) Collected: 01/20/24 1934    Specimen: Blood Updated: 01/20/24 2003     Lactate 1.4 mmol/L     Blood Culture - Blood, Arm, Left [[id number]] Collected: 01/20/24 1934    Specimen: Blood from Arm, Left Updated: 01/20/24 1941    High Sensitivity Troponin T [[id number]]  (Abnormal) Collected: 01/20/24 1934    Specimen: Blood Updated: 01/20/24 2007     HS Troponin T 35 ng/L     Narrative:      High Sensitive Troponin T Reference Range:  <14.0 ng/L- Negative Female for AMI  <22.0 ng/L- Negative Male for AMI  >=14 - Abnormal Female indicating possible myocardial injury.  >=22 - Abnormal Male indicating possible myocardial injury.   Clinicians would have to utilize clinical acumen, EKG, Troponin, and serial changes to determine if it is an Acute Myocardial Infarction or myocardial injury due to an underlying chronic condition.         CBC Auto Differential [[id number]]  (Abnormal) Collected: 01/20/24 1934    Specimen: Blood Updated: 01/20/24 1947     WBC 18.55 10*3/mm3      RBC 4.72 10*6/mm3      Hemoglobin 14.1 g/dL      Hematocrit 43.0 %      MCV 91.1 fL      MCH 29.9 pg      MCHC 32.8 g/dL      RDW 12.1 %      RDW-SD  "39.5 fl      MPV 10.5 fL      Platelets 457 10*3/mm3      Neutrophil % 82.2 %      Lymphocyte % 8.5 %      Monocyte % 7.9 %      Eosinophil % 0.5 %      Basophil % 0.3 %      Immature Grans % 0.6 %      Neutrophils, Absolute 15.23 10*3/mm3      Lymphocytes, Absolute 1.58 10*3/mm3      Monocytes, Absolute 1.47 10*3/mm3      Eosinophils, Absolute 0.09 10*3/mm3      Basophils, Absolute 0.06 10*3/mm3      Immature Grans, Absolute 0.12 10*3/mm3      nRBC 0.1 /100 WBC     Procalcitonin [[id number]]  (Normal) Collected: 01/20/24 1934    Specimen: Blood Updated: 01/20/24 2152     Procalcitonin 0.07 ng/mL     Narrative:      As a Marker for Sepsis (Non-Neonates):    1. <0.5 ng/mL represents a low risk of severe sepsis and/or septic shock.  2. >2 ng/mL represents a high risk of severe sepsis and/or septic shock.    As a Marker for Lower Respiratory Tract Infections that require antibiotic therapy:    PCT on Admission    Antibiotic Therapy       6-12 Hrs later    >0.5                Strongly Recommended  >0.25 - <0.5        Recommended   0.1 - 0.25          Discouraged              Remeasure/reassess PCT  <0.1                Strongly Discouraged     Remeasure/reassess PCT    As 28 day mortality risk marker: \"Change in Procalcitonin Result\" (>80% or <=80%) if Day 0 (or Day 1) and Day 4 values are available. Refer to http://[email or web address]-pct-calculator.com    Change in PCT <=80%  A decrease of PCT levels below or equal to 80% defines a positive change in PCT test result representing a higher risk for 28-day all-cause mortality of patients diagnosed with severe sepsis for septic shock.    Change in PCT >80%  A decrease of PCT levels of more than 80% defines a negative change in PCT result representing a lower risk for 28-day all-cause mortality of patients diagnosed with severe sepsis or septic shock.       Respiratory Panel PCR w/COVID-19(SARS-CoV-2) GERONIMO/TRA/MU/PAD/[facility]/MEGAN In-House, NP Swab in UTM/VTM, 2 HR TAT - Swab, Nasopharynx " [[id number]]  (Normal) Collected: 01/20/24 1948    Specimen: Swab from Nasopharynx Updated: 01/20/24 2100     ADENOVIRUS, PCR Not Detected     Coronavirus 229E Not Detected     Coronavirus HKU1 Not Detected     Coronavirus NL63 Not Detected     Coronavirus OC43 Not Detected     COVID19 Not Detected     Human Metapneumovirus Not Detected     Human Rhinovirus/Enterovirus Not Detected     Influenza A PCR Not Detected     Influenza B PCR Not Detected     Parainfluenza Virus 1 Not Detected     Parainfluenza Virus 2 Not Detected     Parainfluenza Virus 3 Not Detected     Parainfluenza Virus 4 Not Detected     RSV, PCR Not Detected     Bordetella pertussis pcr Not Detected     Bordetella parapertussis PCR Not Detected     Chlamydophila pneumoniae PCR Not Detected     Mycoplasma pneumo by PCR Not Detected    Narrative:      In the setting of a positive respiratory panel with a viral infection PLUS a negative procalcitonin without other underlying concern for bacterial infection, consider observing off antibiotics or discontinuation of antibiotics and continue supportive care. If the respiratory panel is positive for atypical bacterial infection (Bordetella pertussis, Chlamydophila pneumoniae, or Mycoplasma pneumoniae), consider antibiotic de-escalation to target atypical bacterial infection.    Blood Culture - Blood, Blood, Venous [[id number]] Collected: 01/20/24 2012    Specimen: Blood, Venous Updated: 01/20/24 2207    High Sensitivity Troponin T 2Hr [[id number]]  (Abnormal) Collected: 01/20/24 2137    Specimen: Blood Updated: 01/20/24 2213     HS Troponin T 38 ng/L      Troponin T Delta 3 ng/L     Narrative:      High Sensitive Troponin T Reference Range:  <14.0 ng/L- Negative Female for AMI  <22.0 ng/L- Negative Male for AMI  >=14 - Abnormal Female indicating possible myocardial injury.  >=22 - Abnormal Male indicating possible myocardial injury.   Clinicians would have to utilize clinical acumen, EKG, Troponin, and serial  changes to determine if it is an Acute Myocardial Infarction or myocardial injury due to an underlying chronic condition.                 Imaging Results (Last 24 Hours)       Procedure Component Value Units Date/Time    XR Chest 1 View [[id number]] Collected: 01/20/24 2034     Updated: 01/20/24 2038    Narrative:      SINGLE VIEW OF THE CHEST     HISTORY: Shortness of breath     COMPARISON: January 12, 2024     FINDINGS:  The heart is enlarged. There are bilateral pulmonary opacities. These  have worsened when compared to prior study. No pneumothorax is seen. No  large effusion is identified. There is calcification of the aorta.       Impression:      Worsening bilateral pulmonary opacities.     This report was finalized on 1/20/2024 8:35 PM by Dr. Courtney Gold M.D on Workstation: [id number]               Results for orders placed in visit on 01/26/16    SCANNED - ECHOCARDIOGRAM      ECG 12 Lead Dyspnea   Preliminary Result   HEART RATE= 123  bpm   RR Interval= 488  ms   WI Interval=   ms   P Horizontal Axis=   deg   P Front Axis=   deg   QRSD Interval= 152  ms   QT Interval= 340  ms   QTcB= 487  ms   QRS Axis= 125  deg   T Wave Axis= -36  deg   - ABNORMAL ECG -   Atrial fibrillation   Right bundle branch block   Lateral infarct, recent   Baseline wander in lead(s) II,III,aVF   Electronically Signed By:    Date and Time of Study: 2024-01-20 19:19:06           Assessment/Plan     Active Hospital Problems    Diagnosis  POA    **Acute diastolic (congestive) heart failure [I50.31]  Yes    Pulmonary emphysema [J43.9]  Yes    Essential hypertension [I10]  Yes    Leukocytosis [D72.829]  Yes    Alcohol use [Z78.9]  Yes    Longstanding persistent atrial fibrillation [I48.11]  Yes    Atrial fibrillation with RVR [I48.91]  Yes      Resolved Hospital Problems   No resolved problems to display.   Acute Diastolic CHF  - CXR noted, procalcitonin is low no evidence of pneumonia, no further abx planned  - start on IV lasix  40 mg Q12H  - monitor ins, outs, daily weights, and chemistries  - check echocardiogram  - consult cardiology    Afib/RVR  - likely related to above  - had a dose of digoxin in the ER with improvement, continue oral dosing  - resume diltiazem    EtOH Use  - cover with PRN ativan per [facility] protocol  - vitamin replacement    Leukocytosis/Thrombocytosis  - I suspect this is reactive, no evidence of infection  - monitor    I discussed the patient's findings and my recommendations with patient, nursing staff, and ED provider.    VTE Prophylaxis - Xarelto (home med).  Code Status - Full code.       Steven Pinzon MD  [facility]  01/20/24  23:06 EST

## 2024-01-21 NOTE — PAYOR COMM NOTE
"Katrina Morejon (73 y.o. Male)     ATTN: NURSE REVIEWER  RE: INITIAL INPT AUTH CLINICALS   REF# [id number]   PLS REPLY TO BECKY LINCOLN [phone] OR FAX# [phone]      Date of Birth   1950    Social Security Number       Address   [address] [place] [address] [address] [place] [address]    Home Phone   [phone]    MRN   [id number]       Yarsani   Confucianist    Marital Status   Single                            Admission Date   1/20/24    Admission Type   Emergency    Admitting Provider   Steven Pinzon MD    Attending Provider   Kim Givens MD    Department, Room/Bed   [address], E[address]/1       Discharge Date       Discharge Disposition       Discharge Destination                                 Attending Provider: Kim Givens MD    Allergies: No Known Allergies    Isolation: Enh Drop/Con   Infection: COVID (rule out) (01/20/24)   Code Status: CPR    Ht: 172.7 cm (67.99\")   Wt: 80.1 kg (176 lb 8 oz)    Admission Cmt: None   Principal Problem: None                  Active Insurance as of 1/20/2024       Primary Coverage       Payor Plan Insurance Group Employer/Plan Group    ANTHEM MEDICARE REPLACEMENT ANTHEM MEDICARE ADVANTAGE [id number]       Payor Plan Address Payor Plan Phone Number Payor Plan Fax Number Effective Dates    [address] [phone]  1/1/2024 - None Entered    [facility] [id number]         Subscriber Name Subscriber Birth Date Member ID       KATRINA MOREJON 1950 [id number]                     Emergency Contacts        (Rel.) Home Phone Work Phone Mobile Phone    Levi Somers (Spouse) [phone] -- [phone]                 History & Physical        Steven Pinzon MD at 01/20/24 8954              Patient Name:  Katrina Morejon  YOB: 1950  MRN:  [id number]  Admit Date:  1/20/2024  Patient Care Team:  Provider, No Known as PCP - General      Subjective  History Present Illness     Chief Complaint "   Patient presents with    Shortness of Breath       Mr. Morejon is a 73 y.o. former smoker with a history of HTN, cardiomyopathy with recovered ejection fraction, longstanding persistent afib, COPD, and alcohol use that presents to [facility] complaining of worsening exertional dyspnea and orthopnea for the past few weeks. He has additionally had a minimally productive cough. He visited his cardiologist on 24 with these complaints and he was thought to have been volume overloaded. He was sent to [facility] and they thought he had pneumonia and discharged him on antibiotics. His symptoms continued to worsen which prompted this visit.     Review of Systems   All other systems reviewed and are negative.       Personal History     Past Medical History:   Diagnosis Date    Atrial fibrillation     Hypertension     Renal infarct      Past Surgical History:   Procedure Laterality Date    CARDIAC CATHETERIZATION      CARDIOVERSION       Family History   Problem Relation Age of Onset    No Known Problems Mother     No Known Problems Father     No Known Problems Sister     No Known Problems Brother     No Known Problems Maternal Aunt     No Known Problems Maternal Uncle     No Known Problems Paternal Aunt     No Known Problems Paternal Uncle     No Known Problems Maternal Grandmother     No Known Problems Maternal Grandfather     No Known Problems Paternal Grandmother     No Known Problems Paternal Grandfather      Social History     Tobacco Use    Smoking status: Former     Packs/day: 2.00     Years: 45.00     Additional pack years: 0.00     Total pack years: 90.00     Types: Cigarettes     Quit date: 2012     Years since quittin.0    Smokeless tobacco: Never    Tobacco comments:     caffeine use   Vaping Use    Vaping Use: Never used   Substance Use Topics    Alcohol use: Yes     Comment: 3 drinks a day    Drug use: No     No current facility-administered medications on file  prior to encounter.     Current Outpatient Medications on File Prior to Encounter   Medication Sig Dispense Refill    buPROPion XL (WELLBUTRIN XL) 300 MG 24 hr tablet Take 1 tablet by mouth Daily.      cephalexin (KEFLEX) 500 MG capsule Take 1 capsule by mouth 2 (Two) Times a Day for 10 days. 20 capsule 0    digoxin (LANOXIN) 250 MCG tablet TAKE 1 TABLET DAILY 90 tablet 3    dilTIAZem XR (DILACOR XR) 120 MG 24 hr capsule Take 1 capsule by mouth Daily. 90 capsule 3    fluticasone (FLONASE) 50 MCG/ACT nasal spray 1 spray into the nostril(s) as directed by provider Daily.      lisinopril (PRINIVIL,ZESTRIL) 2.5 MG tablet TAKE 1 TABLET DAILY 90 tablet 3    methocarbamol (ROBAXIN) 750 MG tablet Take 1 tablet by mouth 3 (Three) Times a Day.      Xarelto 20 MG tablet TAKE 1 TABLET DAILY 90 tablet 3    amoxicillin-clavulanate (AUGMENTIN) 875-125 MG per tablet Take 1 tablet by mouth Every 12 (Twelve) Hours. (Patient not taking: Reported on 1/20/2024)      doxycycline (VIBRAMYCIN) 100 MG capsule Take 1 capsule by mouth Every 12 (Twelve) Hours. (Patient not taking: Reported on 1/20/2024)      nebivolol (BYSTOLIC) 5 MG tablet Take 1 tablet by mouth Daily. (Patient not taking: Reported on 1/20/2024)       No Known Allergies    Objective   Objective     Vital Signs  Temp:  [98.1 °F (36.7 °C)] 98.1 °F (36.7 °C)  Heart Rate:  [] 58  Resp:  [20-24] 20  BP: (138-157)/(88-99) 157/88  SpO2:  [95 %-99 %] 98 %  on  Flow (L/min):  [2] 2;   Device (Oxygen Therapy): nasal cannula  Body mass index is 27.44 kg/m².    Physical Exam  Vitals and nursing note reviewed.   Constitutional:       General: He is not in acute distress.     Appearance: He is not toxic-appearing or diaphoretic.   HENT:      Head: Normocephalic and atraumatic.      Nose: Nose normal.      Mouth/Throat:      Mouth: Mucous membranes are moist.      Pharynx: Oropharynx is clear.   Eyes:      Conjunctiva/sclera: Conjunctivae normal.      Pupils: Pupils are equal, round,  and reactive to light.   Cardiovascular:      Rate and Rhythm: Normal rate. Rhythm irregular.      Pulses: Normal pulses.   Pulmonary:      Effort: Pulmonary effort is normal.      Breath sounds: Rales present.   Abdominal:      General: Bowel sounds are normal.      Palpations: Abdomen is soft.      Tenderness: There is no abdominal tenderness.   Musculoskeletal:         General: Swelling (2+ BLE) present. No tenderness.      Cervical back: Neck supple.   Skin:     General: Skin is warm and dry.      Capillary Refill: Capillary refill takes less than 2 seconds.   Neurological:      General: No focal deficit present.      Mental Status: He is alert and oriented to person, place, and time.   Psychiatric:         Mood and Affect: Mood normal.         Behavior: Behavior normal.       Results Review:  I reviewed the patient's new clinical results.  I reviewed the patient's new imaging results and agree with the interpretation.  I reviewed the patient's other test results and agree with the interpretation  I personally viewed and interpreted the patient's EKG/Telemetry data  Discussed with ED provider.    Lab Results (last 24 hours)       Procedure Component Value Units Date/Time    CBC & Differential [[id number]]  (Abnormal) Collected: 01/20/24 1934    Specimen: Blood Updated: 01/20/24 1947    Narrative:      The following orders were created for panel order CBC & Differential.  Procedure                               Abnormality         Status                     ---------                               -----------         ------                     CBC Auto Differential[[id number]]        Abnormal            Final result                 Please view results for these tests on the individual orders.    Comprehensive Metabolic Panel [[id number]]  (Abnormal) Collected: 01/20/24 1934    Specimen: Blood Updated: 01/20/24 2007     Glucose 158 mg/dL      BUN 15 mg/dL      Creatinine 0.96 mg/dL      Sodium 137 mmol/L       Potassium 4.8 mmol/L      Chloride 101 mmol/L      CO2 24.1 mmol/L      Calcium 9.2 mg/dL      Total Protein 7.0 g/dL      Albumin 3.4 g/dL      ALT (SGPT) 27 U/L      AST (SGOT) 18 U/L      Alkaline Phosphatase 73 U/L      Total Bilirubin 0.6 mg/dL      Globulin 3.6 gm/dL      A/G Ratio 0.9 g/dL      BUN/Creatinine Ratio 15.6     Anion Gap 11.9 mmol/L      eGFR 83.5 mL/min/1.73     Narrative:      GFR Normal >60  Chronic Kidney Disease <60  Kidney Failure <15    The GFR formula is only valid for adults with stable renal function between ages 18 and 70.    Lactic Acid, Plasma [[id number]]  (Normal) Collected: 01/20/24 1934    Specimen: Blood Updated: 01/20/24 2003     Lactate 1.4 mmol/L     Blood Culture - Blood, Arm, Left [[id number]] Collected: 01/20/24 1934    Specimen: Blood from Arm, Left Updated: 01/20/24 1941    High Sensitivity Troponin T [[id number]]  (Abnormal) Collected: 01/20/24 1934    Specimen: Blood Updated: 01/20/24 2007     HS Troponin T 35 ng/L     Narrative:      High Sensitive Troponin T Reference Range:  <14.0 ng/L- Negative Female for AMI  <22.0 ng/L- Negative Male for AMI  >=14 - Abnormal Female indicating possible myocardial injury.  >=22 - Abnormal Male indicating possible myocardial injury.   Clinicians would have to utilize clinical acumen, EKG, Troponin, and serial changes to determine if it is an Acute Myocardial Infarction or myocardial injury due to an underlying chronic condition.         CBC Auto Differential [[id number]]  (Abnormal) Collected: 01/20/24 1934    Specimen: Blood Updated: 01/20/24 1947     WBC 18.55 10*3/mm3      RBC 4.72 10*6/mm3      Hemoglobin 14.1 g/dL      Hematocrit 43.0 %      MCV 91.1 fL      MCH 29.9 pg      MCHC 32.8 g/dL      RDW 12.1 %      RDW-SD 39.5 fl      MPV 10.5 fL      Platelets 457 10*3/mm3      Neutrophil % 82.2 %      Lymphocyte % 8.5 %      Monocyte % 7.9 %      Eosinophil % 0.5 %      Basophil % 0.3 %      Immature Grans % 0.6 %      Neutrophils,  "Absolute 15.23 10*3/mm3      Lymphocytes, Absolute 1.58 10*3/mm3      Monocytes, Absolute 1.47 10*3/mm3      Eosinophils, Absolute 0.09 10*3/mm3      Basophils, Absolute 0.06 10*3/mm3      Immature Grans, Absolute 0.12 10*3/mm3      nRBC 0.1 /100 WBC     Procalcitonin [[id number]]  (Normal) Collected: 01/20/24 1934    Specimen: Blood Updated: 01/20/24 2152     Procalcitonin 0.07 ng/mL     Narrative:      As a Marker for Sepsis (Non-Neonates):    1. <0.5 ng/mL represents a low risk of severe sepsis and/or septic shock.  2. >2 ng/mL represents a high risk of severe sepsis and/or septic shock.    As a Marker for Lower Respiratory Tract Infections that require antibiotic therapy:    PCT on Admission    Antibiotic Therapy       6-12 Hrs later    >0.5                Strongly Recommended  >0.25 - <0.5        Recommended   0.1 - 0.25          Discouraged              Remeasure/reassess PCT  <0.1                Strongly Discouraged     Remeasure/reassess PCT    As 28 day mortality risk marker: \"Change in Procalcitonin Result\" (>80% or <=80%) if Day 0 (or Day 1) and Day 4 values are available. Refer to http://www.[facility]-pct-calculator.com    Change in PCT <=80%  A decrease of PCT levels below or equal to 80% defines a positive change in PCT test result representing a higher risk for 28-day all-cause mortality of patients diagnosed with severe sepsis for septic shock.    Change in PCT >80%  A decrease of PCT levels of more than 80% defines a negative change in PCT result representing a lower risk for 28-day all-cause mortality of patients diagnosed with severe sepsis or septic shock.       Respiratory Panel PCR w/COVID-19(SARS-CoV-2) GERONIMO/TRA/MU/PAD/COR/MEGAN In-House, NP Swab in [facility]3], 2 HR TAT - Swab, Nasopharynx [[id number]]  (Normal) Collected: 01/20/24 1948    Specimen: Swab from Nasopharynx Updated: 01/20/24 2100     ADENOVIRUS, PCR Not Detected     Coronavirus 229E Not Detected     Coronavirus HKU1 Not Detected     " Coronavirus NL63 Not Detected     Coronavirus OC43 Not Detected     COVID19 Not Detected     Human Metapneumovirus Not Detected     Human Rhinovirus/Enterovirus Not Detected     Influenza A PCR Not Detected     Influenza B PCR Not Detected     Parainfluenza Virus 1 Not Detected     Parainfluenza Virus 2 Not Detected     Parainfluenza Virus 3 Not Detected     Parainfluenza Virus 4 Not Detected     RSV, PCR Not Detected     Bordetella pertussis pcr Not Detected     Bordetella parapertussis PCR Not Detected     Chlamydophila pneumoniae PCR Not Detected     Mycoplasma pneumo by PCR Not Detected    Narrative:      In the setting of a positive respiratory panel with a viral infection PLUS a negative procalcitonin without other underlying concern for bacterial infection, consider observing off antibiotics or discontinuation of antibiotics and continue supportive care. If the respiratory panel is positive for atypical bacterial infection (Bordetella pertussis, Chlamydophila pneumoniae, or Mycoplasma pneumoniae), consider antibiotic de-escalation to target atypical bacterial infection.    Blood Culture - Blood, Blood, Venous [[id number]] Collected: 01/20/24 2012    Specimen: Blood, Venous Updated: 01/20/24 2207    High Sensitivity Troponin T 2Hr [[id number]]  (Abnormal) Collected: 01/20/24 2137    Specimen: Blood Updated: 01/20/24 2213     HS Troponin T 38 ng/L      Troponin T Delta 3 ng/L     Narrative:      High Sensitive Troponin T Reference Range:  <14.0 ng/L- Negative Female for AMI  <22.0 ng/L- Negative Male for AMI  >=14 - Abnormal Female indicating possible myocardial injury.  >=22 - Abnormal Male indicating possible myocardial injury.   Clinicians would have to utilize clinical acumen, EKG, Troponin, and serial changes to determine if it is an Acute Myocardial Infarction or myocardial injury due to an underlying chronic condition.                 Imaging Results (Last 24 Hours)       Procedure Component Value Units  Date/Time    XR Chest 1 View [[id number]] Collected: 01/20/24 2034     Updated: 01/20/24 2038    Narrative:      SINGLE VIEW OF THE CHEST     HISTORY: Shortness of breath     COMPARISON: January 12, 2024     FINDINGS:  The heart is enlarged. There are bilateral pulmonary opacities. These  have worsened when compared to prior study. No pneumothorax is seen. No  large effusion is identified. There is calcification of the aorta.       Impression:      Worsening bilateral pulmonary opacities.     This report was finalized on 1/20/2024 8:35 PM by Dr. Courtney Gold M.D on Workstation: [id number]               Results for orders placed in visit on 01/26/16    SCANNED - ECHOCARDIOGRAM      ECG 12 Lead Dyspnea   Preliminary Result   HEART RATE= 123  bpm   RR Interval= 488  ms   IA Interval=   ms   P Horizontal Axis=   deg   P Front Axis=   deg   QRSD Interval= 152  ms   QT Interval= 340  ms   QTcB= 487  ms   QRS Axis= 125  deg   T Wave Axis= -36  deg   - ABNORMAL ECG -   Atrial fibrillation   Right bundle branch block   Lateral infarct, recent   Baseline wander in lead(s) II,III,aVF   Electronically Signed By:    Date and Time of Study: 2024-01-20 19:19:06           Assessment/Plan     Active Hospital Problems    Diagnosis  POA    **Acute diastolic (congestive) heart failure [I50.31]  Yes    Pulmonary emphysema [J43.9]  Yes    Essential hypertension [I10]  Yes    Leukocytosis [D72.829]  Yes    Alcohol use [Z78.9]  Yes    Longstanding persistent atrial fibrillation [I48.11]  Yes    Atrial fibrillation with RVR [I48.91]  Yes      Resolved Hospital Problems   No resolved problems to display.   Acute Diastolic CHF  - CXR noted, procalcitonin is low no evidence of pneumonia, no further abx planned  - start on IV lasix 40 mg Q12H  - monitor ins, outs, daily weights, and chemistries  - check echocardiogram  - consult cardiology    Afib/RVR  - likely related to above  - had a dose of digoxin in the ER with improvement,  continue oral dosing  - resume diltiazem    EtOH Use  - cover with PRN ativan per [facility] protocol  - vitamin replacement    Leukocytosis/Thrombocytosis  - I suspect this is reactive, no evidence of infection  - monitor    I discussed the patient's findings and my recommendations with patient, nursing staff, and ED provider.    VTE Prophylaxis - Xarelto (home med).  Code Status - Full code.       Steven Pinzon MD  [facility]  01/20/24  23:06 EST    Electronically signed by Steven Pinzon MD at 01/21/24 0150       Facility-Administered Medications as of 1/21/2024   Medication Dose Route Frequency Provider Last Rate Last Admin    acetaminophen (TYLENOL) tablet 650 mg  650 mg Oral Q4H PRN Steven Pinzon MD        Or    acetaminophen (TYLENOL) 160 MG/5ML oral solution 650 mg  650 mg Oral Q4H PRN Steven Pinzon MD        Or    acetaminophen (TYLENOL) suppository 650 mg  650 mg Rectal Q4H PRN Steven Pinozn MD        [COMPLETED] acetaminophen (TYLENOL) tablet 1,000 mg  1,000 mg Oral Once Butch Carranza III, PA   1,000 mg at 01/20/24 2043    [COMPLETED] albuterol (PROVENTIL) nebulizer solution 0.083% 2.5 mg/3mL  2.5 mg Nebulization Q15 Min Krista Schmidt PA-C   2.5 mg at 01/20/24 1956    sennosides-docusate (PERICOLACE) 8.6-50 MG per tablet 2 tablet  2 tablet Oral BID PRN Steven Pinzon MD        And    polyethylene glycol (MIRALAX) packet 17 g  17 g Oral Daily PRN Steven Pinzon MD        And    bisacodyl (DULCOLAX) EC tablet 5 mg  5 mg Oral Daily PRN Steven Pinzon MD        And    bisacodyl (DULCOLAX) suppository 10 mg  10 mg Rectal Daily PRN Steven Pinzon MD        budesonide-formoterol (SYMBICORT) 160-4.5 MCG/ACT inhaler 2 puff  2 puff Inhalation BID - RT Kim Givens MD        buPROPion XL (WELLBUTRIN XL) 24 hr tablet 300 mg  300 mg Oral Daily Steven Pinzon MD   300 mg at 01/21/24 0918    calcium carbonate (TUMS) chewable tablet 500 mg (200  mg elemental)  2 tablet Oral TID PRN Steven Pinzon MD        Calcium Replacement - Follow Nurse / BPA Driven Protocol   Does not apply PRN Kim Givens MD        carvedilol (COREG) tablet 6.25 mg  6.25 mg Oral BID With Meals Kim Givens MD        [COMPLETED] digoxin (LANOXIN) injection 250 mcg  250 mcg Intravenous Once Niranjan Sousa MD   250 mcg at 01/20/24 2028    digoxin (LANOXIN) tablet 250 mcg  250 mcg Oral Daily Steven Pinzon MD   250 mcg at 01/21/24 0918    fluticasone (FLONASE) 50 MCG/ACT nasal spray 1 spray  1 spray Nasal Daily Steven Pinzon MD   1 spray at 01/21/24 0918    [COMPLETED] furosemide (LASIX) injection 80 mg  80 mg Intravenous Once Niranjan Sousa MD   80 mg at 01/20/24 2022    furosemide (LASIX) tablet 40 mg  40 mg Oral BID Betsy Cohn MD        [COMPLETED] ipratropium-albuterol (DUO-NEB) nebulizer solution 3 mL  3 mL Nebulization Once Krista Schmidt PA-C   3 mL at 01/20/24 1943    ipratropium-albuterol (DUO-NEB) nebulizer solution 3 mL  3 mL Nebulization Q6H PRN Kim Givens MD        lisinopril (PRINIVIL,ZESTRIL) tablet 5 mg  5 mg Oral Q24H Kim Givens MD   5 mg at 01/21/24 1245    LORazepam (ATIVAN) tablet 1 mg  1 mg Oral Q6H PRN Kim Givens MD        Magnesium Cardiology Dose Replacement - Follow Nurse / BPA Driven Protocol   Does not apply PRN Kim Givens MD        melatonin tablet 5 mg  5 mg Oral Nightly PRN Steven Pinzon MD   5 mg at 01/21/24 0003    methocarbamol (ROBAXIN) tablet 750 mg  750 mg Oral TID Steven Pinzon MD   750 mg at 01/21/24 1023    [COMPLETED] methylPREDNISolone sodium succinate (SOLU-Medrol) injection 125 mg  125 mg Intravenous Once Krista Schmidt PA-C   125 mg at 01/20/24 2017    multivitamin (THERAGRAN) tablet 1 tablet  1 tablet Oral Daily Kim Givens MD   1 tablet at 01/21/24 7537    nitroglycerin (NITROSTAT) ointment 1 inch  1 inch Topical TID - Nitrates Betsy Cohn MD         nitroglycerin (NITROSTAT) SL tablet 0.4 mg  0.4 mg Sublingual Q5 Min PRN Steven Pinzon MD        ondansetron ODT (ZOFRAN-ODT) disintegrating tablet 4 mg  4 mg Oral Q6H PRN Steven Pinzon MD        Or    ondansetron (ZOFRAN) injection 4 mg  4 mg Intravenous Q6H PRN Steven Pinzon MD        [COMPLETED] perflutren (DEFINITY) 8.476 mg in Sodium Chloride (PF) 0.9 % 10 mL injection  2 mL Intravenous Once in imaging Steven Pinzon MD   2 mL at 01/21/24 0858    Phosphorus Replacement - Follow Nurse / BPA Driven Protocol   Does not apply PRN Kim Givens MD        [COMPLETED] piperacillin-tazobactam (ZOSYN) 3.375 g in iso-osmotic dextrose 50 ml (premix)  3.375 g Intravenous Once Niranjan Sousa MD   3.375 g at 01/20/24 2131    Potassium Replacement - Follow Nurse / BPA Driven Protocol   Does not apply PRN Kim Givens MD        rivaroxaban (XARELTO) tablet 20 mg  20 mg Oral Daily Steven Pinzon MD   20 mg at 01/21/24 0919    sodium chloride 0.9 % flush 10 mL  10 mL Intravenous PRN Niranjan Sousa MD        sodium chloride 0.9 % flush 10 mL  10 mL Intravenous Q12H Steven Pinzon MD   10 mL at 01/21/24 0918    sodium chloride 0.9 % flush 10 mL  10 mL Intravenous PRN Steven Pinzon MD        sodium chloride 0.9 % infusion 40 mL  40 mL Intravenous PRN Steven Pinzon MD        thiamine (VITAMIN B-1) tablet 100 mg  100 mg Oral Daily Kim Givens MD   100 mg at 01/21/24 1339     Lab Results (last 24 hours)       Procedure Component Value Units Date/Time    Basic Metabolic Panel [[id number]]  (Abnormal) Collected: 01/21/24 1124    Specimen: Blood Updated: 01/21/24 1221     Glucose 236 mg/dL      BUN 17 mg/dL      Creatinine 1.01 mg/dL      Sodium 139 mmol/L      Potassium 4.1 mmol/L      Chloride 98 mmol/L      CO2 26.4 mmol/L      Calcium 9.1 mg/dL      BUN/Creatinine Ratio 16.8     Anion Gap 14.6 mmol/L      eGFR 78.5 mL/min/1.73     Narrative:      GFR Normal >60  Chronic Kidney  Disease <60  Kidney Failure <15    The GFR formula is only valid for adults with stable renal function between ages 18 and 70.    BNP [[id number]]  (Abnormal) Collected: 01/21/24 1124    Specimen: Blood Updated: 01/21/24 1219     proBNP 3,524.0 pg/mL     Narrative:      This assay is used as an aid in the diagnosis of individuals suspected of having heart failure. It can be used as an aid in the diagnosis of acute decompensated heart failure (ADHF) in patients presenting with signs and symptoms of ADHF to the emergency department (ED). In addition, NT-proBNP of <300 pg/mL indicates ADHF is not likely.    Age Range Result Interpretation  NT-proBNP Concentration (pg/mL:      <50             Positive            >450                   Gray                 300-450                    Negative             <300    50-75           Positive            >900                  Gray                300-900                  Negative            <300      >75             Positive            >1800                  Gray                300-1800                  Negative            <300    Uric Acid [[id number]]  (Normal) Collected: 01/21/24 0312    Specimen: Blood Updated: 01/21/24 0456     Uric Acid 5.8 mg/dL     CBC & Differential [[id number]]  (Abnormal) Collected: 01/21/24 0312    Specimen: Blood Updated: 01/21/24 0437    Narrative:      The following orders were created for panel order CBC & Differential.  Procedure                               Abnormality         Status                     ---------                               -----------         ------                     CBC Auto Differential[[id number]]        Abnormal            Final result                 Please view results for these tests on the individual orders.    CBC Auto Differential [[id number]]  (Abnormal) Collected: 01/21/24 0312    Specimen: Blood Updated: 01/21/24 0437     WBC 13.87 10*3/mm3      RBC 4.86 10*6/mm3      Hemoglobin 14.5 g/dL      Hematocrit 43.0 %       MCV 88.5 fL      MCH 29.8 pg      MCHC 33.7 g/dL      RDW 11.7 %      RDW-SD 37.5 fl      MPV 10.9 fL      Platelets 405 10*3/mm3      Neutrophil % 94.0 %      Lymphocyte % 3.9 %      Monocyte % 1.4 %      Eosinophil % 0.1 %      Basophil % 0.1 %      Immature Grans % 0.5 %      Neutrophils, Absolute 13.04 10*3/mm3      Lymphocytes, Absolute 0.54 10*3/mm3      Monocytes, Absolute 0.20 10*3/mm3      Eosinophils, Absolute 0.01 10*3/mm3      Basophils, Absolute 0.01 10*3/mm3      Immature Grans, Absolute 0.07 10*3/mm3      nRBC 0.0 /100 WBC     Uric Acid [[id number]]  (Normal) Collected: 01/20/24 2137    Specimen: Blood Updated: 01/20/24 2326     Uric Acid 5.8 mg/dL     High Sensitivity Troponin T 2Hr [[id number]]  (Abnormal) Collected: 01/20/24 2137    Specimen: Blood Updated: 01/20/24 2213     HS Troponin T 38 ng/L      Troponin T Delta 3 ng/L     Narrative:      High Sensitive Troponin T Reference Range:  <14.0 ng/L- Negative Female for AMI  <22.0 ng/L- Negative Male for AMI  >=14 - Abnormal Female indicating possible myocardial injury.  >=22 - Abnormal Male indicating possible myocardial injury.   Clinicians would have to utilize clinical acumen, EKG, Troponin, and serial changes to determine if it is an Acute Myocardial Infarction or myocardial injury due to an underlying chronic condition.         Blood Culture - Blood, Blood, Venous [[id number]] Collected: 01/20/24 2012    Specimen: Blood, Venous Updated: 01/20/24 2207    Procalcitonin [[id number]]  (Normal) Collected: 01/20/24 1934    Specimen: Blood Updated: 01/20/24 2152     Procalcitonin 0.07 ng/mL     Narrative:      As a Marker for Sepsis (Non-Neonates):    1. <0.5 ng/mL represents a low risk of severe sepsis and/or septic shock.  2. >2 ng/mL represents a high risk of severe sepsis and/or septic shock.    As a Marker for Lower Respiratory Tract Infections that require antibiotic therapy:    PCT on Admission    Antibiotic Therapy       6-12 Hrs later    >0.5    "             Strongly Recommended  >0.25 - <0.5        Recommended   0.1 - 0.25          Discouraged              Remeasure/reassess PCT  <0.1                Strongly Discouraged     Remeasure/reassess PCT    As 28 day mortality risk marker: \"Change in Procalcitonin Result\" (>80% or <=80%) if Day 0 (or Day 1) and Day 4 values are available. Refer to [email or web address]    Change in PCT <=80%  A decrease of PCT levels below or equal to 80% defines a positive change in PCT test result representing a higher risk for 28-day all-cause mortality of patients diagnosed with severe sepsis for septic shock.    Change in PCT >80%  A decrease of PCT levels of more than 80% defines a negative change in PCT result representing a lower risk for 28-day all-cause mortality of patients diagnosed with severe sepsis or septic shock.       Respiratory Panel PCR w/COVID-19(SARS-CoV-2) GERONIMO/TRA/MU/PAD/COR/MEGAN In-House, NP Swab in UTM/[facility], 2 HR TAT - Swab, Nasopharynx [[id number]]  (Normal) Collected: 01/20/24 1948    Specimen: Swab from Nasopharynx Updated: 01/20/24 2100     ADENOVIRUS, PCR Not Detected     Coronavirus 229E Not Detected     Coronavirus HKU1 Not Detected     Coronavirus NL63 Not Detected     Coronavirus OC43 Not Detected     COVID19 Not Detected     Human Metapneumovirus Not Detected     Human Rhinovirus/Enterovirus Not Detected     Influenza A PCR Not Detected     Influenza B PCR Not Detected     Parainfluenza Virus 1 Not Detected     Parainfluenza Virus 2 Not Detected     Parainfluenza Virus 3 Not Detected     Parainfluenza Virus 4 Not Detected     RSV, PCR Not Detected     Bordetella pertussis pcr Not Detected     Bordetella parapertussis PCR Not Detected     Chlamydophila pneumoniae PCR Not Detected     Mycoplasma pneumo by PCR Not Detected    Narrative:      In the setting of a positive respiratory panel with a viral infection PLUS a negative procalcitonin without other underlying concern for bacterial " infection, consider observing off antibiotics or discontinuation of antibiotics and continue supportive care. If the respiratory panel is positive for atypical bacterial infection (Bordetella pertussis, Chlamydophila pneumoniae, or Mycoplasma pneumoniae), consider antibiotic de-escalation to target atypical bacterial infection.    [place] Draw [[id number]] Collected: 01/20/24 1934    Specimen: Blood Updated: 01/20/24 2046    Narrative:      The following orders were created for panel order [place] Draw.  Procedure                               Abnormality         Status                     ---------                               -----------         ------                     Green Top (Gel)[[id number]]                                  Final result               Lavender Top[[id number]]                                     Final result               Gold Top - SST[[id number]]                                   Final result               Light Blue Top[[id number]]                                   Final result                 Please view results for these tests on the individual orders.    Green Top (Gel) [[id number]] Collected: 01/20/24 1934    Specimen: Blood Updated: 01/20/24 2046     Extra Tube Hold for add-ons.     Comment: Auto resulted.       Lavender Top [[id number]] Collected: 01/20/24 1934    Specimen: Blood Updated: 01/20/24 2046     Extra Tube hold for add-on     Comment: Auto resulted       Gold Top - SST [[id number]] Collected: 01/20/24 1934    Specimen: Blood Updated: 01/20/24 2046     Extra Tube Hold for add-ons.     Comment: Auto resulted.       Light Blue Top [[id number]] Collected: 01/20/24 1934    Specimen: Blood Updated: 01/20/24 2046     Extra Tube Hold for add-ons.     Comment: Auto resulted       Comprehensive Metabolic Panel [[id number]]  (Abnormal) Collected: 01/20/24 1934    Specimen: Blood Updated: 01/20/24 2007     Glucose 158 mg/dL      BUN 15 mg/dL      Creatinine 0.96 mg/dL      Sodium 137 mmol/L       Potassium 4.8 mmol/L      Chloride 101 mmol/L      CO2 24.1 mmol/L      Calcium 9.2 mg/dL      Total Protein 7.0 g/dL      Albumin 3.4 g/dL      ALT (SGPT) 27 U/L      AST (SGOT) 18 U/L      Alkaline Phosphatase 73 U/L      Total Bilirubin 0.6 mg/dL      Globulin 3.6 gm/dL      A/G Ratio 0.9 g/dL      BUN/Creatinine Ratio 15.6     Anion Gap 11.9 mmol/L      eGFR 83.5 mL/min/1.73     Narrative:      GFR Normal >60  Chronic Kidney Disease <60  Kidney Failure <15    The GFR formula is only valid for adults with stable renal function between ages 18 and 70.    High Sensitivity Troponin T [[id number]]  (Abnormal) Collected: 01/20/24 1934    Specimen: Blood Updated: 01/20/24 2007     HS Troponin T 35 ng/L     Narrative:      High Sensitive Troponin T Reference Range:  <14.0 ng/L- Negative Female for AMI  <22.0 ng/L- Negative Male for AMI  >=14 - Abnormal Female indicating possible myocardial injury.  >=22 - Abnormal Male indicating possible myocardial injury.   Clinicians would have to utilize clinical acumen, EKG, Troponin, and serial changes to determine if it is an Acute Myocardial Infarction or myocardial injury due to an underlying chronic condition.         Lactic Acid, Plasma [[id number]]  (Normal) Collected: 01/20/24 1934    Specimen: Blood Updated: 01/20/24 2003     Lactate 1.4 mmol/L     CBC & Differential [[id number]]  (Abnormal) Collected: 01/20/24 1934    Specimen: Blood Updated: 01/20/24 1947    Narrative:      The following orders were created for panel order CBC & Differential.  Procedure                               Abnormality         Status                     ---------                               -----------         ------                     CBC Auto Differential[[id number]]        Abnormal            Final result                 Please view results for these tests on the individual orders.    CBC Auto Differential [[id number]]  (Abnormal) Collected: 01/20/24 1934    Specimen: Blood Updated:  01/20/24 1947     WBC 18.55 10*3/mm3      RBC 4.72 10*6/mm3      Hemoglobin 14.1 g/dL      Hematocrit 43.0 %      MCV 91.1 fL      MCH 29.9 pg      MCHC 32.8 g/dL      RDW 12.1 %      RDW-SD 39.5 fl      MPV 10.5 fL      Platelets 457 10*3/mm3      Neutrophil % 82.2 %      Lymphocyte % 8.5 %      Monocyte % 7.9 %      Eosinophil % 0.5 %      Basophil % 0.3 %      Immature Grans % 0.6 %      Neutrophils, Absolute 15.23 10*3/mm3      Lymphocytes, Absolute 1.58 10*3/mm3      Monocytes, Absolute 1.47 10*3/mm3      Eosinophils, Absolute 0.09 10*3/mm3      Basophils, Absolute 0.06 10*3/mm3      Immature Grans, Absolute 0.12 10*3/mm3      nRBC 0.1 /100 WBC     Blood Culture - Blood, Arm, Left [[id number]] Collected: 01/20/24 1934    Specimen: Blood from Arm, Left Updated: 01/20/24 1941          Imaging Results (Last 24 Hours)       Procedure Component Value Units Date/Time    XR Chest 1 View [[id number]] Collected: 01/20/24 2034     Updated: 01/20/24 2038    Narrative:      SINGLE VIEW OF THE CHEST     HISTORY: Shortness of breath     COMPARISON: January 12, 2024     FINDINGS:  The heart is enlarged. There are bilateral pulmonary opacities. These  have worsened when compared to prior study. No pneumothorax is seen. No  large effusion is identified. There is calcification of the aorta.       Impression:      Worsening bilateral pulmonary opacities.     This report was finalized on 1/20/2024 8:35 PM by Dr. Courtney Gold M.D on Workstation: [id number]             ECG/EMG Results (last 24 hours)       Procedure Component Value Units Date/Time    SCANNED - TELEMETRY   [[id number]] Resulted: 01/20/24     Updated: 01/21/24 0623    SCANNED - TELEMETRY   [[id number]] Resulted: 01/20/24     Updated: 01/21/24 0713    SCANNED - TELEMETRY   [[id number]] Resulted: 01/20/24     Updated: 01/21/24 0804    ECG 12 Lead Dyspnea [[id number]] Collected: 01/20/24 1919     Updated: 01/21/24 0857     QT Interval 340 ms      QTC Interval 487 ms      Narrative:      HEART RATE= 123  bpm  RR Interval= 488  ms  AR Interval=   ms  P Horizontal Axis=   deg  P Front Axis=   deg  QRSD Interval= 152  ms  QT Interval= 340  ms  QTcB= 487  ms  QRS Axis= 125  deg  T Wave Axis= -36  deg  - ABNORMAL ECG -  Atrial fibrillation  Right bundle branch block  Lateral infarct, recent  No change from previous tracing  Electronically Signed By: Betsy Cohn ([facility]) 21-Jan-2024 08:56:46  Date and Time of Study: 2024-01-20 19:19:06    Adult Transthoracic Echo Complete W/ Cont if Necessary Per Protocol [[id number]] Resulted: 01/21/24 0903     Updated: 01/21/24 0908     EF(MOD-bp) 32.1 %      LV GLOBAL STRAIN  -7.7 %      LVIDd 5.7 cm      LVIDs 4.5 cm      IVSd 1.15 cm      LVPWd 1.03 cm      FS 20.9 %      IVS/LVPW 1.12 cm      ESV(cubed) 93.8 ml      LV Sys Vol (BSA corrected) 42.3 cm2      EDV(cubed) 189.8 ml      LV Ortiz Vol (BSA corrected) 65.3 cm2      LV mass(C)d 257.5 grams      LVOT area 2.9 cm2      LVOT diam 1.91 cm      EDV(MOD-sp2) 181.0 ml      EDV(MOD-sp4) 125.0 ml      ESV(MOD-sp2) 130.0 ml      ESV(MOD-sp4) 81.0 ml      SV(MOD-sp2) 51.0 ml      SV(MOD-sp4) 44.0 ml      SI(MOD-sp2) 26.6 ml/m2      SI(MOD-sp4) 23.0 ml/m2      EF(MOD-sp2) 28.2 %      EF(MOD-sp4) 35.2 %      MV E max leighton 116.0 cm/sec      MV dec time 0.18 sec      LA ESV Index (BP) 40.5 ml/m2      Med Peak E' Leighton 4.3 cm/sec      Lat Peak E' Leighton 6.7 cm/sec      TR max leighton 264.7 cm/sec      Avg E/e' ratio 21.09     SV(LVOT) 38.4 ml      RV Base 4.3 cm      RV Mid 3.1 cm      RV Length 7.5 cm      RV S' 7.7 cm/sec      Pulm Sys Leighton 40.5 cm/sec      Pulm Ortiz Leighotn 58.5 cm/sec      Pulm S/D 0.69     LV V1 max 88.6 cm/sec      LV V1 max PG 3.1 mmHg      LV V1 mean PG 1.44 mmHg      LV V1 VTI 13.4 cm      Ao pk leighton 153.0 cm/sec      Ao max PG 9.4 mmHg      Ao mean PG 5.2 mmHg      Ao V2 VTI 23.8 cm      KIRBY(I,D) 1.62 cm2      MV max PG 5.8 mmHg      MV mean PG 1.72 mmHg      MV V2 VTI 26.0 cm      MV P1/2t  60.8 msec      MVA(P1/2t) 3.6 cm2      MVA(VTI) 1.48 cm2      MV dec slope 625.3 cm/sec2      MR max dimas 361.3 cm/sec      MR max PG 52.2 mmHg      TR max PG 28.0 mmHg      RV V1 max PG 1.94 mmHg      RV V1 max 69.7 cm/sec      RV V1 VTI 12.4 cm      PA V2 max 94.5 cm/sec      PA acc time 0.09 sec      PI end-d dimas 105.7 cm/sec      Ao root diam 3.1 cm      ACS 1.78 cm      Sinus 3.1 cm      STJ 2.7 cm      BH CV ECHO SHUNT ASSESSMENT PERFORMED (HIDDEN SCRIPTING) 1     Dimensionless Index 0.60 (DI)      RVSP(TR) 31 mmHg      RAP systole 3 mmHg      Ascending aorta 3.1 cm     Narrative:        Left ventricular systolic function is moderately decreased. Calculated   left ventricular EF = 32.1%    The left ventricular cavity is moderately dilated.    Left ventricular wall thickness is consistent with mild concentric   hypertrophy.    The following left ventricular wall segments are hypokinetic: mid   anterior, apical anterior, basal anterolateral, mid anterolateral, apical   lateral, basal inferolateral, mid inferolateral, apical inferior, mid   inferior, apical septal, basal inferoseptal, mid inferoseptal, apex   hypokinetic, mid anteroseptal, basal anterior, basal inferior and basal   inferoseptal.    Left ventricular diastolic function was indeterminate.    Mildly reduced right ventricular systolic function noted.    The right ventricular cavity is moderately dilated.    The left atrial cavity is moderately dilated.    Left atrial volume is moderately increased.    The right atrial cavity is severely  dilated.    Moderate mitral valve regurgitation is present.    Mild tricuspid valve regurgitation is present    Estimated right ventricular systolic pressure from tricuspid   regurgitation is normal (<35 mmHg). Calculated right ventricular systolic   pressure from tricuspid regurgitation is 31 mmHg.    Saline test results are negative.            Orders (last 24 hrs)        Start     Ordered    01/22/24 0600  Basic  Metabolic Panel  Morning Draw         01/21/24 1336    01/22/24 0000  BNP  Once         01/21/24 1336    01/21/24 1800  carvedilol (COREG) tablet 6.25 mg  2 Times Daily With Meals         01/21/24 1140    01/21/24 1800  furosemide (LASIX) tablet 40 mg  2 Times Daily (Diuretics)         01/21/24 1336    01/21/24 1430  nitroglycerin (NITROSTAT) ointment 1 inch  3 Times Daily (Nitrates)         01/21/24 1336    01/21/24 1245  budesonide-formoterol (SYMBICORT) 160-4.5 MCG/ACT inhaler 2 puff  2 Times Daily - RT         01/21/24 1149    01/21/24 1245  thiamine (VITAMIN B-1) tablet 100 mg  Daily         01/21/24 1149    01/21/24 1245  multivitamin (THERAGRAN) tablet 1 tablet  Daily         01/21/24 1149    01/21/24 1230  lisinopril (PRINIVIL,ZESTRIL) tablet 5 mg  Every 24 Hours Scheduled         01/21/24 1140    01/21/24 1200  Clinical [place] Withdrawal Assessment  Every 4 Hours       01/21/24 1155    01/21/24 1150  Folate  Once         01/21/24 1149    01/21/24 1150  Inpatient Access Center Consult  Once        Provider:  (Not yet assigned)    01/21/24 1149    01/21/24 1149  LORazepam (ATIVAN) tablet 1 mg  Every 6 Hours PRN         01/21/24 1149    01/21/24 1148  Potassium Replacement - Follow Nurse / BPA Driven Protocol  As Needed         01/21/24 1149    01/21/24 1148  Magnesium Cardiology Dose Replacement - Follow Nurse / BPA Driven Protocol  As Needed         01/21/24 1149    01/21/24 1148  Phosphorus Replacement - Follow Nurse / BPA Driven Protocol  As Needed         01/21/24 1149    01/21/24 1148  Calcium Replacement - Follow Nurse / BPA Driven Protocol  As Needed         01/21/24 1149    01/21/24 1148  ipratropium-albuterol (DUO-NEB) nebulizer solution 3 mL  Every 6 Hours PRN         01/21/24 1149    01/21/24 1141  Inpatient Cardiology Consult  Once        Specialty:  Cardiology  Provider:  Vinicio Mckinley MD    01/21/24 1140    01/21/24 1140  BNP  Once         01/21/24 1140    01/21/24 1140  Hemoglobin  A1c  Once         01/21/24 1140    01/21/24 1140  Digoxin Level  Once         01/21/24 1140    01/21/24 0945  perflutren (DEFINITY) 8.476 mg in Sodium Chloride (PF) 0.9 % 10 mL injection  Once in Imaging         01/21/24 0858    01/21/24 0900  furosemide (LASIX) injection 40 mg  Every 12 Hours,   Status:  Discontinued         01/20/24 2306 01/21/24 0900  buPROPion XL (WELLBUTRIN XL) 24 hr tablet 300 mg  Daily         01/21/24 0133 01/21/24 0900  digoxin (LANOXIN) tablet 250 mcg  Daily         01/21/24 0133 01/21/24 0900  dilTIAZem CD (CARDIZEM CD) 24 hr capsule 120 mg  Every 24 Hours Scheduled,   Status:  Discontinued         01/21/24 0133 01/21/24 0900  fluticasone (FLONASE) 50 MCG/ACT nasal spray 1 spray  Daily         01/21/24 0133 01/21/24 0900  rivaroxaban (XARELTO) tablet 20 mg  Daily         01/21/24 0133 01/21/24 0900  methocarbamol (ROBAXIN) tablet 750 mg  3 Times Daily         01/21/24 0133 01/21/24 0800  Oral Care  2 Times Daily       01/20/24 2304 01/21/24 0700  Inpatient Case Management  Consult  Once        Provider:  (Not yet assigned)    01/21/24 0031    01/21/24 0600  Basic Metabolic Panel  Daily       01/20/24 2304 01/21/24 0600  CBC & Differential  Daily       01/20/24 2304 01/21/24 0600  CBC Auto Differential  PROCEDURE ONCE         01/20/24 2304 01/21/24 0600  Uric Acid  Daily       01/20/24 2306 01/21/24 0000  Vital Signs  Every 4 Hours       01/20/24 2304    01/21/24 0000  sodium chloride 0.9 % flush 10 mL  Every 12 Hours Scheduled         01/20/24 2304 01/21/24 0000  Strict Intake & Output  Every Hour       01/20/24 2306 01/20/24 2307  Daily Weights  Daily       01/20/24 2306    01/20/24 2307  Uric Acid  Once         01/20/24 2306 01/20/24 2306  Adult Transthoracic Echo Complete W/ Cont if Necessary Per Protocol  Once         01/20/24 2305 01/20/24 2305  PT Consult: Eval & Treat As Tolerated; Functional Mobility Below Baseline   Once         01/20/24 2304 01/20/24 2305  OT Consult: Eval & Treat  Once         01/20/24 2304    01/20/24 2304  calcium carbonate (TUMS) chewable tablet 500 mg (200 mg elemental)  3 Times Daily PRN         01/20/24 2304    01/20/24 2304  ondansetron ODT (ZOFRAN-ODT) disintegrating tablet 4 mg  Every 6 Hours PRN        See Hyperspace for full Linked Orders Report.    01/20/24 2304 01/20/24 2304  ondansetron (ZOFRAN) injection 4 mg  Every 6 Hours PRN        See Hyperspace for full Linked Orders Report.    01/20/24 2304 01/20/24 2304  sennosides-docusate (PERICOLACE) 8.6-50 MG per tablet 2 tablet  2 Times Daily PRN        See Hyperspace for full Linked Orders Report.    01/20/24 2304 01/20/24 2304  melatonin tablet 5 mg  Nightly PRN         01/20/24 2304    01/20/24 2304  acetaminophen (TYLENOL) tablet 650 mg  Every 4 Hours PRN        See Hyperspace for full Linked Orders Report.    01/20/24 2304 01/20/24 2304  acetaminophen (TYLENOL) 160 MG/5ML oral solution 650 mg  Every 4 Hours PRN        See Hyperspace for full Linked Orders Report.    01/20/24 2304 01/20/24 2304  acetaminophen (TYLENOL) suppository 650 mg  Every 4 Hours PRN        See Hyperspace for full Linked Orders Report.    01/20/24 2304 01/20/24 2304  Intake & Output  Every Shift       01/20/24 2304 01/20/24 2304  Weigh Patient  Once         01/20/24 2304 01/20/24 2304  Insert Peripheral IV  Once         01/20/24 2304 01/20/24 2304  Saline Lock & Maintain IV Access  Continuous,   Status:  Canceled         01/20/24 2304 01/20/24 2304  Code Status and Medical Interventions:  Continuous         01/20/24 2304 01/20/24 2304  VTE Prophylaxis Not Indicated: Other: Patient Currently Anticoagulated / Receiving Prophylaxis  Once         01/20/24 2304 01/20/24 2304  Continuous Cardiac Monitoring  Continuous        Comments: Follow Standing Orders As Outlined in Process Instructions (Open Order Report to View Full Instructions)     01/20/24 2304 01/20/24 2304  Telemetry - Maintain IV Access  Continuous         01/20/24 2304 01/20/24 2304  Telemetry - Place Orders & Notify Provider of Results When Patient Experiences Acute Chest Pain, Dysrhythmia or Respiratory Distress  Until Discontinued         01/20/24 2304 01/20/24 2304  May Be Off Telemetry for Tests  Continuous         01/20/24 2304 01/20/24 2304  Notify Provider (With Default Parameters)  Until Discontinued         01/20/24 2304 01/20/24 2304  Incentive Spirometry  Every Hour While Awake       01/20/24 2304 01/20/24 2304  Diet: Cardiac Diets; Healthy Heart (2-3 Na+); Texture: Regular Texture (IDDSI 7); Fluid Consistency: Thin (IDDSI 0)  Diet Effective Now         01/20/24 2304 01/20/24 2303  nitroglycerin (NITROSTAT) SL tablet 0.4 mg  Every 5 Minutes PRN         01/20/24 2304 01/20/24 2303  polyethylene glycol (MIRALAX) packet 17 g  Daily PRN        See Hyperspace for full Linked Orders Report.    01/20/24 2304 01/20/24 2303  bisacodyl (DULCOLAX) EC tablet 5 mg  Daily PRN        See Hyperspace for full Linked Orders Report.    01/20/24 2304 01/20/24 2303  bisacodyl (DULCOLAX) suppository 10 mg  Daily PRN        See Hyperspace for full Linked Orders Report.    01/20/24 2304 01/20/24 2303  sodium chloride 0.9 % flush 10 mL  As Needed         01/20/24 2304 01/20/24 2303  sodium chloride 0.9 % infusion 40 mL  As Needed         01/20/24 2304 01/20/24 2134  High Sensitivity Troponin T 2Hr  PROCEDURE ONCE         01/20/24 2007 01/20/24 2131  piperacillin-tazobactam (ZOSYN) 3.375 g in iso-osmotic dextrose 50 ml (premix)  Once         01/20/24 2115 01/20/24 2116  Inpatient Admission  Once         01/20/24 2115 01/20/24 2116  Cardiac Monitoring  Continuous,   Status:  Canceled        Comments: Follow Standing Orders As Outlined in Process Instructions (Open Order Report to View Full Instructions)    01/20/24 2116    01/20/24 2102  Procalcitonin   Once         01/20/24 2106 01/20/24 2106  L[facility] (on-call MD unless specified) Details  Once        Specialty:  Hospitalist  Provider:  (Not yet assigned)    01/20/24 2105 01/20/24 2055  acetaminophen (TYLENOL) tablet 1,000 mg  Once         01/20/24 2039 01/20/24 2008  furosemide (LASIX) injection 80 mg  Once         01/20/24 1952 01/20/24 2008  digoxin (LANOXIN) injection 250 mcg  Once         01/20/24 1952 01/20/24 1951  XR Chest 1 View  1 Time Imaging         01/1950    01/20/24 1944  ipratropium-albuterol (DUO-NEB) nebulizer solution 3 mL  Once         01/20/24 1928 01/20/24 1944  albuterol (PROVENTIL) nebulizer solution 0.083% 2.5 mg/3mL  Every 15 Minutes         01/20/24 1928 01/20/24 1944  methylPREDNISolone sodium succinate (SOLU-Medrol) injection 125 mg  Once         01/20/24 1928 01/20/24 1919  Respiratory Panel PCR w/COVID-19(SARS-CoV-2) GERONIMO/TRA/MU/PAD/COR/MEGAN In-House, NP Swab in UTM/VTM, 2 HR TAT - Swab, Nasopharynx  STAT         01/20/24 1920 01/20/24 1903  Undress & Gown  Once         01/20/24 1903    01/20/24 1903  Continuous Pulse Oximetry  Continuous         01/20/24 1903 01/20/24 1903  Vital Signs  Every 30 Minutes,   Status:  Canceled         01/20/24 1903 01/20/24 1903  Insert Peripheral IV  Once         01/20/24 1903    01/20/24 1903  CBC & Differential  Once         01/20/24 1903    01/20/24 1903  Comprehensive Metabolic Panel  Once         01/20/24 1903    01/20/24 1903  Lactic Acid, Plasma  Once         01/20/24 1903    01/20/24 1903  [place] Draw  Once         01/20/24 1903    01/20/24 1903  Blood Culture - Blood, Blood, Venous  Once         01/20/24 1903    01/20/24 1903  Blood Culture - Blood, Arm, Left  Once         01/20/24 1903    01/20/24 1903  ECG 12 Lead Dyspnea  Once         01/20/24 1903    01/20/24 1903  High Sensitivity Troponin T  Once         01/20/24 1903    01/20/24 1903  CBC Auto Differential  PROCEDURE ONCE         01/20/24 1903     24  Green Top (Gel)  PROCEDURE ONCE         24 1903    24  Lavender Top  PROCEDURE ONCE         24 1903    01/20/24 1903  Gold Top - SST  PROCEDURE ONCE         24 1903    01/20/24 1903  Light Blue Top  PROCEDURE ONCE         24 1903    01/20/24 1902  sodium chloride 0.9 % flush 10 mL  As Needed         24    Unscheduled  Oxygen Therapy- Nasal Cannula; Titrate 1-6 LPM Per SpO2; 90 - 95%  Continuous PRN       24    Unscheduled  Up With Assistance  As Needed       24 2304    --  SCANNED - TELEMETRY           24 0000    --  SCANNED - TELEMETRY           24 0000    --  SCANNED - TELEMETRY           24 0000                  Operative/Procedure Notes (last 24 hours)  Notes from 24 1348 through 24 1348   No notes of this type exist for this encounter.          Physician Progress Notes (last 24 hours)        Kim Givens MD at 24 1150              Name: Jered Morejon ADMIT: 2024   : 1950  PCP: Provider, No Known    MRN: [id number] LOS: 1 days   AGE/SEX: 73 y.o. male  ROOM: [facility]/     Subjective   Subjective   Decreased shortness of breath.  No PND since admission.  No chest pain.  No palpitation.  No lower extremity edema.  No cough.  No wheeze.  No hemoptysis.  No fever or chills.    Review of Systems  GI.  No abdominal pain or nausea or vomiting.  No bowel movement since admission  .  No dysuria or hematuria.    Objective   Objective   Vital Signs  Temp:  [97.5 °F (36.4 °C)-98.1 °F (36.7 °C)] 97.5 °F (36.4 °C)  Heart Rate:  [] 71  Resp:  [20-24] 20  BP: (124-157)/(65-99) 124/72  SpO2:  [93 %-99 %] 98 %  on  Flow (L/min):  [2] 2;   Device (Oxygen Therapy): nasal cannula    Intake/Output Summary (Last 24 hours) at 2024 1152  Last data filed at 2024 1143  Gross per 24 hour   Intake 748 ml   Output 2300 ml   Net -1552 ml     Body mass index is 26.84 kg/m².      24  3839  "01/21/24  0517   Weight: 81.8 kg (180 lb 6.4 oz) 80.1 kg (176 lb 8 oz)     Physical Exam  General.  Elderly gentleman.  A bit anxious.  Alert and oriented x 4.  No apparent pain/distress/diaphoresis.  Eyes.  Pupils equal round and reactive.  Intact extraocular musculature.  No pallor or jaundice.  Oral cavity.  Moist mucous membrane.  Neck.  Supple.  No JVD.  No lymphadenopathy or thyromegaly.  Cardiovascular.  Mildly rapid A-fib and grade 2 systolic murmur.  Chest.  Poor but clear to auscultation bilaterally with no added sounds.  Abdomen.  Soft lax.  No tenderness.  No organomegaly.  No guarding or rebound.  Extremities.  No clubbing/cyanosis.  +1 bilateral lower extremity edema.  CNS.  No acute focal neurological deficits.    Results Review:      Results from last 7 days   Lab Units 01/20/24 1934   SODIUM mmol/L 137   POTASSIUM mmol/L 4.8   CHLORIDE mmol/L 101   CO2 mmol/L 24.1   BUN mg/dL 15   CREATININE mg/dL 0.96   GLUCOSE mg/dL 158*   CALCIUM mg/dL 9.2   AST (SGOT) U/L 18   ALT (SGPT) U/L 27     Estimated Creatinine Clearance: 77.6 mL/min (by C-G formula based on SCr of 0.96 mg/dL).          Results from last 7 days   Lab Units 01/20/24 2137 01/20/24 1934   HSTROP T ng/L 38* 35*                       Invalid input(s): \"LDLCALC\"  Results from last 7 days   Lab Units 01/21/24 0312 01/20/24 1934   WBC 10*3/mm3 13.87* 18.55*   HEMOGLOBIN g/dL 14.5 14.1   HEMATOCRIT % 43.0 43.0   PLATELETS 10*3/mm3 405 457*   MCV fL 88.5 91.1   MCH pg 29.8 29.9   MCHC g/dL 33.7 32.8   RDW % 11.7* 12.1*   RDW-SD fl 37.5 39.5   MPV fL 10.9 10.5   NEUTROPHIL % % 94.0* 82.2*   LYMPHOCYTE % % 3.9* 8.5*   MONOCYTES % % 1.4* 7.9   EOSINOPHIL % % 0.1* 0.5   BASOPHIL % % 0.1 0.3   IMM GRAN % % 0.5 0.6*   NEUTROS ABS 10*3/mm3 13.04* 15.23*   LYMPHS ABS 10*3/mm3 0.54* 1.58   MONOS ABS 10*3/mm3 0.20 1.47*   EOS ABS 10*3/mm3 0.01 0.09   BASOS ABS 10*3/mm3 0.01 0.06   IMMATURE GRANS (ABS) 10*3/mm3 0.07* 0.12*   NRBC /100 WBC 0.0 0.1       "       Results from last 7 days   Lab Units 01/20/24  1934   PROCALCITONIN ng/mL 0.07   LACTATE mmol/L 1.4                 Results from last 7 days   Lab Units 01/20/24  1948   ADENOVIRUS DETECTION BY PCR  Not Detected   CORONAVIRUS 229E  Not Detected   CORONAVIRUS HKU1  Not Detected   CORONAVIRUS NL63  Not Detected   CORONAVIRUS OC43  Not Detected   HUMAN METAPNEUMOVIRUS  Not Detected   HUMAN RHINOVIRUS/ENTEROVIRUS  Not Detected   INFLUENZA B PCR  Not Detected   PARAINFLUENZA 1  Not Detected   PARAINFLUENZA VIRUS 2  Not Detected   PARAINFLUENZA VIRUS 3  Not Detected   PARAINFLUENZA VIRUS 4  Not Detected   BORDETELLA PERTUSSIS PCR  Not Detected   CHLAMYDOPHILA PNEUMONIAE PCR  Not Detected   MYCOPLAMA PNEUMO PCR  Not Detected   INFLUENZA A PCR  Not Detected   RSV, PCR  Not Detected         Results from last 7 days   Lab Units 01/21/24  0312   URIC ACID mg/dL 5.8       Imaging:  Imaging Results (Last 24 Hours)       Procedure Component Value Units Date/Time    XR Chest 1 View [[id number]] Collected: 01/20/24 2034     Updated: 01/20/24 2038    Narrative:      SINGLE VIEW OF THE CHEST     HISTORY: Shortness of breath     COMPARISON: January 12, 2024     FINDINGS:  The heart is enlarged. There are bilateral pulmonary opacities. These  have worsened when compared to prior study. No pneumothorax is seen. No  large effusion is identified. There is calcification of the aorta.       Impression:      Worsening bilateral pulmonary opacities.     This report was finalized on 1/20/2024 8:35 PM by Dr. Courtney Gold M.D on Workstation: [id number]                  I reviewed the patient's new clinical results / labs / tests / procedures      Assessment/Plan     Active Hospital Problems    Diagnosis  POA    Pulmonary emphysema [J43.9]  Yes    Essential hypertension [I10]  Yes    Leukocytosis [D72.829]  Yes    Alcohol use [Z78.9]  Yes    Longstanding persistent atrial fibrillation [I48.11]  Yes    Acute systolic (congestive)  heart failure [I50.21]  Yes    Atrial fibrillation with RVR [I48.91]  Yes      Resolved Hospital Problems   No resolved problems to display.           Acute systolic congestive heart failure exacerbation (new onset)/rapid A-fib in a patient with a history of hypertension/chronic diastolic congestive heart failure/A-fib.  2D echo with an ejection fraction of 32%, left ventricular hypertrophy, left atrial enlargement, moderate MR, multiple hypokinetic areas.  Plan rate controlled with Coreg and digoxin.  Stop diltiazem and initiate lisinopril.  Continue anticoagulation with Xarelto.  I believe the patient will need ischemic workup but will leave that to cardiology.  Will check proBNP.  History of COPD/recent pneumonia status posttreatment.  Procalcitonin and lactic acid are normal.  There is no fever.  Respiratory PCR is negative.  White count is elevated.  I do not feel that this is pneumonia.  Repeat chest x-ray after diuresis.  Will initiate Symbicort and as needed DuoNebs.  Alcohol abuse.  No withdrawals clinically.  Will initiate CIWA protocol and detoxification.  Leukocytosis/thrombocytosis.  Leukocytosis improving.  Thrombocytosis resolved.  Will monitor.  Hyperglycemia.  Will check A1c.  VTE prophylaxis.  Patient anticoagulated.      Discussed my findings and plan of treatment with the patient.  Disposition.  To be determined based on clinical course.        Kim Givens MD  [facility]  24  11:52 EST           Electronically signed by Kim Givens MD at 24 1156          Consult Notes (last 24 hours)        Betsy Cohn MD at 24 1328          [facility]  Cardiology Consult Note    Patient Identification:  Name: Jered Morejon  Age: 73 y.o.  Sex: male  :  1950  MRN: [id number]             Requesting Physician: Dr. Sweeney    Reason for Consultation / Chief Complaint: management recommendations congestive heart failure    History  of Present Illness:   73-year-old with chronic atrial fibrillation came in with worsening of shortness of breath echocardiogram revealed EF of 32%    Comorbid cardiac risk factors:     Past Medical History:  Past Medical History:   Diagnosis Date    Atrial fibrillation     Hypertension     Renal infarct      Past Surgical History:  Past Surgical History:   Procedure Laterality Date    CARDIAC CATHETERIZATION      CARDIOVERSION        Allergies:  No Known Allergies  Home Meds:  Medications Prior to Admission   Medication Sig Dispense Refill Last Dose    buPROPion XL (WELLBUTRIN XL) 300 MG 24 hr tablet Take 1 tablet by mouth Daily.   2024    digoxin (LANOXIN) 250 MCG tablet TAKE 1 TABLET DAILY 90 tablet 3 2024    dilTIAZem XR (DILACOR XR) 120 MG 24 hr capsule Take 1 capsule by mouth Daily. 90 capsule 3 2024    fluticasone (FLONASE) 50 MCG/ACT nasal spray 1 spray into the nostril(s) as directed by provider Daily.   2024    lisinopril (PRINIVIL,ZESTRIL) 2.5 MG tablet TAKE 1 TABLET DAILY 90 tablet 3 2024    methocarbamol (ROBAXIN) 750 MG tablet Take 1 tablet by mouth 3 (Three) Times a Day.   Past Week    Xarelto 20 MG tablet TAKE 1 TABLET DAILY 90 tablet 3 2024    nebivolol (BYSTOLIC) 5 MG tablet Take 1 tablet by mouth Daily. (Patient not taking: Reported on 2024)   Not Taking     Current Meds:   [unfilled]  Social History:   Social History     Tobacco Use    Smoking status: Former     Packs/day: 2.00     Years: 45.00     Additional pack years: 0.00     Total pack years: 90.00     Types: Cigarettes     Quit date: 2012     Years since quittin.0    Smokeless tobacco: Never    Tobacco comments:     caffeine use   Substance Use Topics    Alcohol use: Yes     Comment: 3 drinks a day      Family History:  Family History   Problem Relation Age of Onset    No Known Problems Mother     No Known Problems Father     No Known Problems Sister     No Known Problems Brother     No Known  Problems Maternal Aunt     No Known Problems Maternal Uncle     No Known Problems Paternal Aunt     No Known Problems Paternal Uncle     No Known Problems Maternal Grandmother     No Known Problems Maternal Grandfather     No Known Problems Paternal Grandmother     No Known Problems Paternal Grandfather         Review of Systems  Review of Systems  Constitutional: No wt loss, fever   Gastrointestinal: No nausea , abdominal pain  Behavioral/Psych: No insomnia or anxiety   Cardiovascular ----positive for shortness of breath. All other systems reviewed and are negative            Constitutional:  Temp:  [97.5 °F (36.4 °C)-98.1 °F (36.7 °C)] 97.5 °F (36.4 °C)  Heart Rate:  [] 98  Resp:  [20-24] 22  BP: (124-157)/(65-99) 125/81    Physical Exam   General:  Appears in no acute distress  Eyes: PERTL,  HEENT:  No JVD. Thyroid not visibly enlarged. No mucosal pallor or cyanosis  Respiratory: Respirations regular and unlabored at rest. BBS with good air entry in all fields. No crackles, rubs or wheezes auscultated  Cardiovascular: S1S2 Regular rate and rhythm. No murmur, rub or gallop auscultated. No carotid bruits. DP/PT pulses    . No pretibial pitting edema  Gastrointestinal: Abdomen soft, flat, non tender. Bowel sounds present. No hepatosplenomegaly. No ascites  Musculoskeletal: SCHULTE x4. No abnormal movements  Extremities: No digital clubbing or cyanosis  Skin: Color pink. Skin warm and dry to touch. No rashes  No xanthoma  Neuro: AAO x3 CN II-XII grossly intact              Cardiographics  ECG:     Telemetry:    Echocardiogram:     Imaging  Chest X-ray:     Lab Review   Results from last 7 days   Lab Units 01/20/24 2137 01/20/24 1934   HSTROP T ng/L 38* 35*         Results from last 7 days   Lab Units 01/21/24  1124   SODIUM mmol/L 139   POTASSIUM mmol/L 4.1   BUN mg/dL 17   CREATININE mg/dL 1.01   CALCIUM mg/dL 9.1     @LABRCNTIPbnp@  Results from last 7 days   Lab Units 01/21/24  0312 01/20/24  1934   WBC  10*3/mm3 13.87* 18.55*   HEMOGLOBIN g/dL 14.5 14.1   HEMATOCRIT % 43.0 43.0   PLATELETS 10*3/mm3 405 457*             Assessment:  Borderline elevated troponin  Hypertension  Emphysema  Diastolic heart failure  Systolic heart failure EF of 32%  Abnormal echocardiogram with wall motion abnormality  Moderate mitral regurgitation  Persistent atrial fibrillation  Right bundle branch block  Recommendations / Plan:   Continue anticoagulation  Patient should have ischemic workup with a stress test versus heart catheterization because of the abnormal echocardiogram  Blood pressure is fairly controlled  Elevated BNP  Continue Lasix, changed to p.o.  Check the dig level in the morning  We will discontinue lisinopril    Entresto will be started 36 hours after  Add nitro ointment at this stage  Betsy Cohn MD  1/21/2024, 13:29 EST      EMR Dragon/Transcription:   Dictated utilizing Dragon dictation      Electronically signed by Betsy Cohn MD at 01/21/24 3732

## 2024-01-21 NOTE — CONSULTS
[facility]  Cardiology Consult Note    Patient Identification:  Name: Jered Morejon  Age: 73 y.o.  Sex: male  :  1950  MRN: [id number]             Requesting Physician: Dr. Sweeney    Reason for Consultation / Chief Complaint: management recommendations congestive heart failure    History of Present Illness:   73-year-old with chronic atrial fibrillation came in with worsening of shortness of breath echocardiogram revealed EF of 32%    Comorbid cardiac risk factors:     Past Medical History:  Past Medical History:   Diagnosis Date    Atrial fibrillation     Hypertension     Renal infarct      Past Surgical History:  Past Surgical History:   Procedure Laterality Date    CARDIAC CATHETERIZATION      CARDIOVERSION        Allergies:  No Known Allergies  Home Meds:  Medications Prior to Admission   Medication Sig Dispense Refill Last Dose    buPROPion XL (WELLBUTRIN XL) 300 MG 24 hr tablet Take 1 tablet by mouth Daily.   2024    digoxin (LANOXIN) 250 MCG tablet TAKE 1 TABLET DAILY 90 tablet 3 2024    dilTIAZem XR (DILACOR XR) 120 MG 24 hr capsule Take 1 capsule by mouth Daily. 90 capsule 3 2024    fluticasone (FLONASE) 50 MCG/ACT nasal spray 1 spray into the nostril(s) as directed by provider Daily.   2024    lisinopril (PRINIVIL,ZESTRIL) 2.5 MG tablet TAKE 1 TABLET DAILY 90 tablet 3 2024    methocarbamol (ROBAXIN) 750 MG tablet Take 1 tablet by mouth 3 (Three) Times a Day.   Past Week    Xarelto 20 MG tablet TAKE 1 TABLET DAILY 90 tablet 3 2024    nebivolol (BYSTOLIC) 5 MG tablet Take 1 tablet by mouth Daily. (Patient not taking: Reported on 2024)   Not Taking     Current Meds:   [unfilled]  Social History:   Social History     Tobacco Use    Smoking status: Former     Packs/day: 2.00     Years: 45.00     Additional pack years: 0.00     Total pack years: 90.00     Types: Cigarettes     Quit date: 2012     Years since quittin.0    Smokeless tobacco: Never     Tobacco comments:     caffeine use   Substance Use Topics    Alcohol use: Yes     Comment: 3 drinks a day      Family History:  Family History   Problem Relation Age of Onset    No Known Problems Mother     No Known Problems Father     No Known Problems Sister     No Known Problems Brother     No Known Problems Maternal Aunt     No Known Problems Maternal Uncle     No Known Problems Paternal Aunt     No Known Problems Paternal Uncle     No Known Problems Maternal Grandmother     No Known Problems Maternal Grandfather     No Known Problems Paternal Grandmother     No Known Problems Paternal Grandfather         Review of Systems  Review of Systems  Constitutional: No wt loss, fever   Gastrointestinal: No nausea , abdominal pain  Behavioral/Psych: No insomnia or anxiety   Cardiovascular ----positive for shortness of breath. All other systems reviewed and are negative            Constitutional:  Temp:  [97.5 °F (36.4 °C)-98.1 °F (36.7 °C)] 97.5 °F (36.4 °C)  Heart Rate:  [] 98  Resp:  [20-24] 22  BP: (124-157)/(65-99) 125/81    Physical Exam   General:  Appears in no acute distress  Eyes: PERTL,  HEENT:  No JVD. Thyroid not visibly enlarged. No mucosal pallor or cyanosis  Respiratory: Respirations regular and unlabored at rest. BBS with good air entry in all fields. No crackles, rubs or wheezes auscultated  Cardiovascular: S1S2 Regular rate and rhythm. No murmur, rub or gallop auscultated. No carotid bruits. DP/PT pulses    . No pretibial pitting edema  Gastrointestinal: Abdomen soft, flat, non tender. Bowel sounds present. No hepatosplenomegaly. No ascites  Musculoskeletal: SCHULTE x4. No abnormal movements  Extremities: No digital clubbing or cyanosis  Skin: Color pink. Skin warm and dry to touch. No rashes  No xanthoma  Neuro: AAO x3 CN II-XII grossly intact              Cardiographics  ECG:     Telemetry:    Echocardiogram:     Imaging  Chest X-ray:     Lab Review   Results from last 7 days   Lab Units  01/20/24 2137 01/20/24 1934   HSTROP T ng/L 38* 35*         Results from last 7 days   Lab Units 01/21/24  1124   SODIUM mmol/L 139   POTASSIUM mmol/L 4.1   BUN mg/dL 17   CREATININE mg/dL 1.01   CALCIUM mg/dL 9.1     @LABRCNTIPbnp@  Results from last 7 days   Lab Units 01/21/24  0312 01/20/24 1934   WBC 10*3/mm3 13.87* 18.55*   HEMOGLOBIN g/dL 14.5 14.1   HEMATOCRIT % 43.0 43.0   PLATELETS 10*3/mm3 405 457*             Assessment:  Borderline elevated troponin  Hypertension  Emphysema  Diastolic heart failure  Systolic heart failure EF of 32%  Abnormal echocardiogram with wall motion abnormality  Moderate mitral regurgitation  Persistent atrial fibrillation  Right bundle branch block  Recommendations / Plan:   Continue anticoagulation  Patient should have ischemic workup with a stress test versus heart catheterization because of the abnormal echocardiogram  Blood pressure is fairly controlled  Elevated BNP  Continue Lasix, changed to p.o.  Check the dig level in the morning  We will discontinue lisinopril    Entresto will be started 36 hours after  Add nitro ointment at this stage  Betsy Cohn MD  1/21/2024, 13:29 EST      EMR Dragon/Transcription:   Dictated utilizing Dragon dictation

## 2024-01-21 NOTE — PLAN OF CARE
Goal Outcome Evaluation:  Plan of Care Reviewed With: patient        Progress: improving  Outcome Evaluation: VSS afebrile. Pt ambulatory from ER WC to BR upon arrival to room 453, standby assist d/t unsteady gait. Diuresing well from IV lasix, no other complaints. Plan of care ongoing.

## 2024-01-21 NOTE — PROGRESS NOTES
Name: Jered Morejon ADMIT: 2024   : 1950  PCP: Provider, No Known    MRN: [id number] LOS: 1 days   AGE/SEX: 73 y.o. male  ROOM: E454/1     Subjective   Subjective   Decreased shortness of breath.  No PND since admission.  No chest pain.  No palpitation.  No lower extremity edema.  No cough.  No wheeze.  No hemoptysis.  No fever or chills.    Review of Systems  GI.  No abdominal pain or nausea or vomiting.  No bowel movement since admission  .  No dysuria or hematuria.     Objective   Objective   Vital Signs  Temp:  [97.5 °F (36.4 °C)-98.1 °F (36.7 °C)] 97.5 °F (36.4 °C)  Heart Rate:  [] 71  Resp:  [20-24] 20  BP: (124-157)/(65-99) 124/72  SpO2:  [93 %-99 %] 98 %  on  Flow (L/min):  [2] 2;   Device (Oxygen Therapy): nasal cannula    Intake/Output Summary (Last 24 hours) at 2024 1152  Last data filed at 2024 1143  Gross per 24 hour   Intake 748 ml   Output 2300 ml   Net -1552 ml     Body mass index is 26.84 kg/m².      24  2227 24  0517   Weight: 81.8 kg (180 lb 6.4 oz) 80.1 kg (176 lb 8 oz)     Physical Exam  General.  Elderly gentleman.  A bit anxious.  Alert and oriented x 4.  No apparent pain/distress/diaphoresis.  Eyes.  Pupils equal round and reactive.  Intact extraocular musculature.  No pallor or jaundice.  Oral cavity.  Moist mucous membrane.  Neck.  Supple.  No JVD.  No lymphadenopathy or thyromegaly.  Cardiovascular.  Mildly rapid A-fib and grade 2 systolic murmur.  Chest.  Poor but clear to auscultation bilaterally with no added sounds.  Abdomen.  Soft lax.  No tenderness.  No organomegaly.  No guarding or rebound.  Extremities.  No clubbing/cyanosis.  +1 bilateral lower extremity edema.  CNS.  No acute focal neurological deficits.    Results Review:      Results from last 7 days   Lab Units 24  1934   SODIUM mmol/L 137   POTASSIUM mmol/L 4.8   CHLORIDE mmol/L 101   CO2 mmol/L 24.1   BUN mg/dL 15   CREATININE mg/dL 0.96   GLUCOSE mg/dL 158*   CALCIUM  "mg/dL 9.2   AST (SGOT) U/L 18   ALT (SGPT) U/L 27     Estimated Creatinine Clearance: 77.6 mL/min (by C-G formula based on SCr of 0.96 mg/dL).          Results from last 7 days   Lab Units 01/20/24 2137 01/20/24 1934   HSTROP T ng/L 38* 35*                       Invalid input(s): \"LDLCALC\"  Results from last 7 days   Lab Units 01/21/24  0312 01/20/24 1934   WBC 10*3/mm3 13.87* 18.55*   HEMOGLOBIN g/dL 14.5 14.1   HEMATOCRIT % 43.0 43.0   PLATELETS 10*3/mm3 405 457*   MCV fL 88.5 91.1   MCH pg 29.8 29.9   MCHC g/dL 33.7 32.8   RDW % 11.7* 12.1*   RDW-SD fl 37.5 39.5   MPV fL 10.9 10.5   NEUTROPHIL % % 94.0* 82.2*   LYMPHOCYTE % % 3.9* 8.5*   MONOCYTES % % 1.4* 7.9   EOSINOPHIL % % 0.1* 0.5   BASOPHIL % % 0.1 0.3   IMM GRAN % % 0.5 0.6*   NEUTROS ABS 10*3/mm3 13.04* 15.23*   LYMPHS ABS 10*3/mm3 0.54* 1.58   MONOS ABS 10*3/mm3 0.20 1.47*   EOS ABS 10*3/mm3 0.01 0.09   BASOS ABS 10*3/mm3 0.01 0.06   IMMATURE GRANS (ABS) 10*3/mm3 0.07* 0.12*   NRBC /100 WBC 0.0 0.1             Results from last 7 days   Lab Units 01/20/24 1934   PROCALCITONIN ng/mL 0.07   LACTATE mmol/L 1.4                 Results from last 7 days   Lab Units 01/20/24 1948   ADENOVIRUS DETECTION BY PCR  Not Detected   CORONAVIRUS 229E  Not Detected   CORONAVIRUS HKU1  Not Detected   CORONAVIRUS NL63  Not Detected   CORONAVIRUS OC43  Not Detected   HUMAN METAPNEUMOVIRUS  Not Detected   HUMAN RHINOVIRUS/ENTEROVIRUS  Not Detected   INFLUENZA B PCR  Not Detected   PARAINFLUENZA 1  Not Detected   PARAINFLUENZA VIRUS 2  Not Detected   PARAINFLUENZA VIRUS 3  Not Detected   PARAINFLUENZA VIRUS 4  Not Detected   BORDETELLA PERTUSSIS PCR  Not Detected   CHLAMYDOPHILA PNEUMONIAE PCR  Not Detected   MYCOPLAMA PNEUMO PCR  Not Detected   INFLUENZA A PCR  Not Detected   RSV, PCR  Not Detected         Results from last 7 days   Lab Units 01/21/24  0312   URIC ACID mg/dL 5.8       Imaging:  Imaging Results (Last 24 Hours)       Procedure Component Value Units " Date/Time    XR Chest 1 View [[id number]] Collected: 01/20/24 2034     Updated: 01/20/24 2038    Narrative:      SINGLE VIEW OF THE CHEST     HISTORY: Shortness of breath     COMPARISON: January 12, 2024     FINDINGS:  The heart is enlarged. There are bilateral pulmonary opacities. These  have worsened when compared to prior study. No pneumothorax is seen. No  large effusion is identified. There is calcification of the aorta.       Impression:      Worsening bilateral pulmonary opacities.     This report was finalized on 1/20/2024 8:35 PM by Dr. Courtney Gold M.D on Workstation: [id number]                  I reviewed the patient's new clinical results / labs / tests / procedures      Assessment/Plan     Active Hospital Problems    Diagnosis  POA    Pulmonary emphysema [J43.9]  Yes    Essential hypertension [I10]  Yes    Leukocytosis [D72.829]  Yes    Alcohol use [Z78.9]  Yes    Longstanding persistent atrial fibrillation [I48.11]  Yes    Acute systolic (congestive) heart failure [I50.21]  Yes    Atrial fibrillation with RVR [I48.91]  Yes      Resolved Hospital Problems   No resolved problems to display.           Acute systolic congestive heart failure exacerbation (new onset)/rapid A-fib in a patient with a history of hypertension/chronic diastolic congestive heart failure/A-fib.  2D echo with an ejection fraction of 32%, left ventricular hypertrophy, left atrial enlargement, moderate MR, multiple hypokinetic areas.  Plan rate controlled with Coreg and digoxin.  Stop diltiazem and initiate lisinopril.  Continue anticoagulation with Xarelto.  I believe the patient will need ischemic workup but will leave that to cardiology.  Will check proBNP.  History of COPD/recent pneumonia status posttreatment.  Procalcitonin and lactic acid are normal.  There is no fever.  Respiratory PCR is negative.  White count is elevated.  I do not feel that this is pneumonia.  Repeat chest x-ray after diuresis.  Will initiate  Symbicort and as needed DuoNebs.  Alcohol abuse.  No withdrawals clinically.  Will initiate CIWA protocol and detoxification.  Leukocytosis/thrombocytosis.  Leukocytosis improving.  Thrombocytosis resolved.  Will monitor.  Hyperglycemia.  Will check A1c.  VTE prophylaxis.  Patient anticoagulated.      Discussed my findings and plan of treatment with the patient.  Disposition.  To be determined based on clinical course.        Kim Givens MD  [facility]  01/21/24  11:52 EST

## 2024-01-21 NOTE — SIGNIFICANT NOTE
01/21/24 1052   OTHER   Discipline physical therapist   Rehab Time/Intention   Session Not Performed other (see comments)  (attempted to see this am, pt declines at this time. Pt states he is moving well, but limited by his SOA. Pt wishes to rest, but is agreeable for therapy to check back tomorrow.)   Recommendation   PT - Next Appointment 01/22/24

## 2024-01-21 NOTE — ED NOTES
Nursing report ED to floor  Jered Morejon  73 y.o.  male    HPI :   Chief Complaint   Patient presents with    Shortness of Breath       Admitting doctor:   Steven Pinzon MD    Admitting diagnosis:   The primary encounter diagnosis was Atrial fibrillation with RVR. Diagnoses of Acute congestive heart failure, unspecified heart failure type and Hypoxia were also pertinent to this visit.    Code status:   Current Code Status       Date Active Code Status Order ID Comments User Context       Not on file            Allergies:   Patient has no known allergies.    Isolation:   No active isolations    Intake and Output  No intake or output data in the 24 hours ending 01/20/24 2118    Weight:   There were no vitals filed for this visit.    Most recent vitals:   Vitals:    01/20/24 1943 01/20/24 1956 01/20/24 2022 01/20/24 2023   BP:   143/98 143/89   BP Location:    Right arm   Patient Position:    Lying   Pulse: 104 (!) 123 116 109   Resp: 24 24 20   Temp:       SpO2: 95% 99%  95%       Active LDAs/IV Access:   Lines, Drains & Airways       Active LDAs       Name Placement date Placement time Site Days    Peripheral IV 01/20/24 1945 Left Antecubital 01/20/24 1945  Antecubital  less than 1                    Labs (abnormal labs have a star):   Labs Reviewed   COMPREHENSIVE METABOLIC PANEL - Abnormal; Notable for the following components:       Result Value    Glucose 158 (*)     Albumin 3.4 (*)     All other components within normal limits    Narrative:     GFR Normal >60  Chronic Kidney Disease <60  Kidney Failure <15    The GFR formula is only valid for adults with stable renal function between ages 18 and 70.   TROPONIN - Abnormal; Notable for the following components:    HS Troponin T 35 (*)     All other components within normal limits    Narrative:     High Sensitive Troponin T Reference Range:  <14.0 ng/L- Negative Female for AMI  <22.0 ng/L- Negative Male for AMI  >=14 - Abnormal Female indicating possible  myocardial injury.  >=22 - Abnormal Male indicating possible myocardial injury.   Clinicians would have to utilize clinical acumen, EKG, Troponin, and serial changes to determine if it is an Acute Myocardial Infarction or myocardial injury due to an underlying chronic condition.        CBC WITH AUTO DIFFERENTIAL - Abnormal; Notable for the following components:    WBC 18.55 (*)     RDW 12.1 (*)     Platelets 457 (*)     Neutrophil % 82.2 (*)     Lymphocyte % 8.5 (*)     Immature Grans % 0.6 (*)     Neutrophils, Absolute 15.23 (*)     Monocytes, Absolute 1.47 (*)     Immature Grans, Absolute 0.12 (*)     All other components within normal limits   RESPIRATORY PANEL PCR W/ COVID-19 (SARS-COV-2), NP SWAB IN UTM/VTP, 2 HR TAT - Normal    Narrative:     In the setting of a positive respiratory panel with a viral infection PLUS a negative procalcitonin without other underlying concern for bacterial infection, consider observing off antibiotics or discontinuation of antibiotics and continue supportive care. If the respiratory panel is positive for atypical bacterial infection (Bordetella pertussis, Chlamydophila pneumoniae, or Mycoplasma pneumoniae), consider antibiotic de-escalation to target atypical bacterial infection.   LACTIC ACID, PLASMA - Normal   BLOOD CULTURE   BLOOD CULTURE   RAINBOW DRAW    Narrative:     The following orders were created for panel order [place] Draw.  Procedure                               Abnormality         Status                     ---------                               -----------         ------                     Green Top (Gel)[[id number]]                                  Final result               Lavender Top[[id number]]                                     Final result               Gold Top - SST[[id number]]                                   Final result               Light Blue Top[[id number]]                                   Final result                 Please view results for  these tests on the individual orders.   HIGH SENSITIVITIY TROPONIN T 2HR   PROCALCITONIN   CBC AND DIFFERENTIAL    Narrative:     The following orders were created for panel order CBC & Differential.  Procedure                               Abnormality         Status                     ---------                               -----------         ------                     CBC Auto Differential[[id number]]        Abnormal            Final result                 Please view results for these tests on the individual orders.   GREEN TOP   LAVENDER TOP   GOLD TOP - SST   LIGHT BLUE TOP       EKG:   ECG 12 Lead Dyspnea   Preliminary Result   HEART RATE= 123  bpm   RR Interval= 488  ms   RI Interval=   ms   P Horizontal Axis=   deg   P Front Axis=   deg   QRSD Interval= 152  ms   QT Interval= 340  ms   QTcB= 487  ms   QRS Axis= 125  deg   T Wave Axis= -36  deg   - ABNORMAL ECG -   Atrial fibrillation   Right bundle branch block   Lateral infarct, recent   Baseline wander in lead(s) II,III,aVF   Electronically Signed By:    Date and Time of Study: 2024-01-20 19:19:06          Meds given in ED:   Medications   sodium chloride 0.9 % flush 10 mL (has no administration in time range)   piperacillin-tazobactam (ZOSYN) 3.375 g in iso-osmotic dextrose 50 ml (premix) (has no administration in time range)   ipratropium-albuterol (DUO-NEB) nebulizer solution 3 mL (3 mL Nebulization Given 1/20/24 1943)   albuterol (PROVENTIL) nebulizer solution 0.083% 2.5 mg/3mL (2.5 mg Nebulization Given 1/20/24 1956)   methylPREDNISolone sodium succinate (SOLU-Medrol) injection 125 mg (125 mg Intravenous Given 1/20/24 2017)   furosemide (LASIX) injection 80 mg (80 mg Intravenous Given 1/20/24 2022)   digoxin (LANOXIN) injection 250 mcg (250 mcg Intravenous Given 1/20/24 2028)   acetaminophen (TYLENOL) tablet 1,000 mg (1,000 mg Oral Given 1/20/24 2043)       Imaging results:  XR Chest 1 View    Result Date: 1/20/2024  Worsening bilateral pulmonary  opacities.  This report was finalized on 2024 8:35 PM by Dr. Courtney Gold M.D on Workstation: [id number]       Ambulatory status:   - assist x 1     Social issues:   Social History     Socioeconomic History    Marital status: Single   Tobacco Use    Smoking status: Former     Packs/day: 2.00     Years: 45.00     Additional pack years: 0.00     Total pack years: 90.00     Types: Cigarettes     Quit date: 2012     Years since quittin.0    Smokeless tobacco: Never    Tobacco comments:     caffeine use   Vaping Use    Vaping Use: Never used   Substance and Sexual Activity    Alcohol use: Yes     Comment: 3 drinks a day    Drug use: No    Sexual activity: Defer       NIH Stroke Scale:       Jake Sawant RN  24 21:18 EST

## 2024-01-21 NOTE — ED PROVIDER NOTES
EMERGENCY DEPARTMENT MD ATTESTATION NOTE    SHARED VISIT: This visit was performed by BOTH a physician and an APC. The substantive portion of the medical decision making was performed by this attesting physician who made or approved the management plan and takes responsibility for patient management. All studies documented in the APC note (if performed) were independently interpreted by me.    The MAGDALENO and I have discussed this patient's history, physical exam, MDM, and treatment plan.  I have reviewed the documentation and personally had a face to face interaction with the patient. The attached note describes my personal findings.        Room Number:  07/07  PCP: Provider, No Known  Independent Historians: Patient and EMS    HPI:  A complete HPI/ROS/PMH/PSH/SH/FH are unobtainable due to: None    Chronic or social conditions impacting patient care (Social Determinants of Health): None      Context: Jered Morejon is a 73 y.o. male with a medical history of atrial fibrillation, alcohol use who presents to the ED c/o acute shortness of breath.  The patient reports he has had shortness of breath for the last few weeks.  He reports over the last few days it is gotten significantly worse.  He reports he recently went to the ER and was prescribed antibiotics.  He states every time he lays flat he gets short of breath.  He denies any chest pain.        Review of prior external notes (non-ED) -and- Review of prior external test results outside of this encounter:  Cardiology note dated 1/12/2024 noting the patient is volume overloaded and he was sent to the emergency room for management.  ER visit the same day notes pneumonia and was treated with antibiotics.    Prescription drug monitoring program review:     N/A      PHYSICAL EXAM    I have reviewed the triage vital signs and nursing notes.    ED Triage Vitals   Temp Heart Rate Resp BP SpO2   01/20/24 1900 01/20/24 1900 01/20/24 1900 01/20/24 1900 01/20/24 1900   98.1 °F  (36.7 °C) (!) 130 24 138/88 97 %      Temp src Heart Rate Source Patient Position BP Location FiO2 (%)   -- 01/20/24 1943 -- -- --    Monitor          Physical Exam  GENERAL: Awake, alert, acutely and chronically ill-appearing  SKIN: Warm, dry  HENT: Normocephalic, atraumatic  EYES: no scleral icterus  CV: Irregular rhythm, irregular rate  RESPIRATORY: normal effort, lungs clear  ABDOMEN: soft, nontender, nondistended  MUSCULOSKELETAL: no deformity, pitting edema to the lower extremities from the toes to the mid thigh bilaterally.  No asymmetry  NEURO: alert, moves all extremities, follows commands      NIH:                                                             MEDICATIONS GIVEN IN ER  Medications   sodium chloride 0.9 % flush 10 mL (has no administration in time range)   piperacillin-tazobactam (ZOSYN) 3.375 g in iso-osmotic dextrose 50 ml (premix) (has no administration in time range)   ipratropium-albuterol (DUO-NEB) nebulizer solution 3 mL (3 mL Nebulization Given 1/20/24 1943)   albuterol (PROVENTIL) nebulizer solution 0.083% 2.5 mg/3mL (2.5 mg Nebulization Given 1/20/24 1956)   methylPREDNISolone sodium succinate (SOLU-Medrol) injection 125 mg (125 mg Intravenous Given 1/20/24 2017)   furosemide (LASIX) injection 80 mg (80 mg Intravenous Given 1/20/24 2022)   digoxin (LANOXIN) injection 250 mcg (250 mcg Intravenous Given 1/20/24 2028)   acetaminophen (TYLENOL) tablet 1,000 mg (1,000 mg Oral Given 1/20/24 2043)         ORDERS PLACED DURING THIS VISIT:  Orders Placed This Encounter   Procedures    Blood Culture - Blood,    Blood Culture - Blood,    Respiratory Panel PCR w/COVID-19(SARS-CoV-2) GERONIMO/TRA/MU/PAD/COR/MEGAN In-House, NP Swab in UTM/VTM, 2 HR TAT - Swab, Nasopharynx    XR Chest 1 View    Comprehensive Metabolic Panel    Lactic Acid, Plasma    [place] Draw    High Sensitivity Troponin T    CBC Auto Differential    High Sensitivity Troponin T 2Hr    Procalcitonin    Undress & Gown    Continuous Pulse  Oximetry    Vital Signs    LHA (on-call MD unless specified) Details    Oxygen Therapy- Nasal Cannula; Titrate 1-6 LPM Per SpO2; 90 - 95%    ECG 12 Lead Dyspnea    Insert Peripheral IV    Inpatient Admission    CBC & Differential    Green Top (Gel)    Lavender Top    Gold Top - SST    Light Blue Top         PROCEDURES  Procedures      Critical care provider statement:    Critical care time (minutes): 45.   Critical care time was exclusive of:  Separately billable procedures and treating other patients   Critical care was necessary to treat or prevent imminent or life-threatening deterioration of the following conditions:  Cardiac Failure   Critical care was time spent personally by me on the following activities:  Development of treatment plan with patient or surrogate, discussions with consultants, evaluation of patient's response to treatment, examination of patient, obtaining history from patient or surrogate, ordering and performing treatments and interventions, ordering and review of laboratory studies, ordering and review of radiographic studies, pulse oximetry, re-evaluation of patient's condition and review of old charts. Critical Care indicators: Atrial Fibrillation with Tachycardia Anti-arrhythmics: adenocard, adenosine, amiodarone, atropine, bretylium, cardizem, digoxin, inderal, lidocaine, magnesium sulfate, procainamide, verapamil, et al.      PROGRESS, DATA ANALYSIS, CONSULTS, AND MEDICAL DECISION MAKING  All labs have been independently interpreted by me.  All radiology studies have been reviewed by me. All EKG's have been independently viewed and interpreted by me.  Discussion below represents my analysis of pertinent findings related to patient's condition, differential diagnosis, treatment plan and final disposition.    Differential diagnosis includes but is not limited to pneumonia, CHF, volume overload, A-fib with RVR, hypoxia, PE, acute coronary syndrome, acute aortic syndrome.    Clinical  Scores:                  ED Course as of 01/20/24 2115   Sat Jan 20, 2024 1927 First Look: Patient presents via EMS for shortness of breath.  States he was diagnosed with pneumonia a week ago Friday at the [facility] ED.  He has been progressively getting more short of breath, much worse today, having to sleep upright.  Stopped smoking 12 years ago.  Denies any history of CHF or any known history of COPD or asthma. He denies chest pain. He has wheezes and rhonchi bilaterally. [KA]   2036 XR Chest 1 View  My independent interpretation of the chest x-ray is volume overload, patchy infiltrates in the upper and lower lobes [TR]   2036 EKG          EKG time: 1919  Rhythm/Rate: A-fib with RVR, rate 123  P waves and TN: Absent  QRS, axis: Right bundle branch block  ST and T waves: No acute    Independently Interpreted by me  Not significantly changed compared to prior 1/12/2024   [TR]   2050 Heart Rate: 109 [TR]   2108 WBC(!): 18.55 [TR]   2108 Lactate: 1.4 [TR]   2114 Discussing with Dr. Pinzon with A.  He agrees to admit.  The patient does have an elevated white blood cell count and infiltrates however I think in the end this is going to be related to steroids and volume overload rather than infectious cause.  It is difficult to discern at this time.  He is an alcoholic and so is at risk of atypical infection.  Plan to give him a dose of Zosyn until we can determine whether this is infectious versus volume. [TR]   2115 The patient is requiring supplemental oxygen which is new. [TR]      ED Course User Index  [KA] Krista Schmidt PA-C  [TR] Niranjan Sousa MD       MDM: This patient is obviously volume overloaded with A-fib with RVR.  He has a history of A-fib and the cardiologist has him on digoxin and Cardizem.  His rate is likely affecting his fluid status.  Plan to give him some more digoxin to get his rate under control and will give him a dose of Lasix.  His blood pressure is in the 120s and I  suspect that if he was to receive Cardizem and Lasix that he would become hypotensive.  Plan to initiate diuresis.        COMPLEXITY OF CARE  The patient requires admission.    Please note that portions of this document were completed with a voice recognition program.    Note Disclaimer: At [facility], we believe that sharing information builds trust and better relationships. You are receiving this note because you recently visited [facility]. It is possible you will see health information before a provider has talked with you about it. This kind of information can be easy to misunderstand. To help you fully understand what it means for your health, we urge you to discuss this note with your provider.         Niranjan Sousa MD  01/20/24 1471

## 2024-01-22 ENCOUNTER — APPOINTMENT (OUTPATIENT)
Dept: CT IMAGING | Facility: HOSPITAL | Age: 74
End: 2024-01-22
Payer: MEDICARE

## 2024-01-22 PROBLEM — I42.0 CARDIOMYOPATHY, DILATED: Status: ACTIVE | Noted: 2024-01-22

## 2024-01-22 LAB
ANION GAP SERPL CALCULATED.3IONS-SCNC: 11.2 MMOL/L (ref 5–15)
BASOPHILS # BLD AUTO: 0.04 10*3/MM3 (ref 0–0.2)
BASOPHILS NFR BLD AUTO: 0.1 % (ref 0–1.5)
BUN SERPL-MCNC: 20 MG/DL (ref 8–23)
BUN/CREAT SERPL: 18.7 (ref 7–25)
CALCIUM SPEC-SCNC: 8.7 MG/DL (ref 8.6–10.5)
CHLORIDE SERPL-SCNC: 100 MMOL/L (ref 98–107)
CO2 SERPL-SCNC: 26.8 MMOL/L (ref 22–29)
CREAT SERPL-MCNC: 1.07 MG/DL (ref 0.76–1.27)
DEPRECATED RDW RBC AUTO: 36.6 FL (ref 37–54)
DEPRECATED RDW RBC AUTO: 38.5 FL (ref 37–54)
EGFRCR SERPLBLD CKD-EPI 2021: 73.3 ML/MIN/1.73
EOSINOPHIL # BLD AUTO: 0.01 10*3/MM3 (ref 0–0.4)
EOSINOPHIL NFR BLD AUTO: 0 % (ref 0.3–6.2)
ERYTHROCYTE [DISTWIDTH] IN BLOOD BY AUTOMATED COUNT: 11.6 % (ref 12.3–15.4)
ERYTHROCYTE [DISTWIDTH] IN BLOOD BY AUTOMATED COUNT: 11.9 % (ref 12.3–15.4)
GLUCOSE SERPL-MCNC: 116 MG/DL (ref 65–99)
HCT VFR BLD AUTO: 38.2 % (ref 37.5–51)
HCT VFR BLD AUTO: 39.1 % (ref 37.5–51)
HGB BLD-MCNC: 12.3 G/DL (ref 13–17.7)
HGB BLD-MCNC: 13 G/DL (ref 13–17.7)
IMM GRANULOCYTES # BLD AUTO: 0.34 10*3/MM3 (ref 0–0.05)
IMM GRANULOCYTES NFR BLD AUTO: 1.2 % (ref 0–0.5)
LYMPHOCYTES # BLD AUTO: 1.62 10*3/MM3 (ref 0.7–3.1)
LYMPHOCYTES NFR BLD AUTO: 5.7 % (ref 19.6–45.3)
MCH RBC QN AUTO: 28.3 PG (ref 26.6–33)
MCH RBC QN AUTO: 29.5 PG (ref 26.6–33)
MCHC RBC AUTO-ENTMCNC: 32.2 G/DL (ref 31.5–35.7)
MCHC RBC AUTO-ENTMCNC: 33.2 G/DL (ref 31.5–35.7)
MCV RBC AUTO: 87.8 FL (ref 79–97)
MCV RBC AUTO: 88.7 FL (ref 79–97)
MONOCYTES # BLD AUTO: 2.58 10*3/MM3 (ref 0.1–0.9)
MONOCYTES NFR BLD AUTO: 9.1 % (ref 5–12)
NEUTROPHILS NFR BLD AUTO: 23.75 10*3/MM3 (ref 1.7–7)
NEUTROPHILS NFR BLD AUTO: 83.9 % (ref 42.7–76)
NRBC BLD AUTO-RTO: 0 /100 WBC (ref 0–0.2)
PLATELET # BLD AUTO: 427 10*3/MM3 (ref 140–450)
PLATELET # BLD AUTO: 453 10*3/MM3 (ref 140–450)
PMV BLD AUTO: 10.5 FL (ref 6–12)
PMV BLD AUTO: 10.7 FL (ref 6–12)
POTASSIUM SERPL-SCNC: 3.9 MMOL/L (ref 3.5–5.2)
RBC # BLD AUTO: 4.35 10*6/MM3 (ref 4.14–5.8)
RBC # BLD AUTO: 4.41 10*6/MM3 (ref 4.14–5.8)
SODIUM SERPL-SCNC: 138 MMOL/L (ref 136–145)
URATE SERPL-MCNC: 6.1 MG/DL (ref 3.4–7)
WBC NRBC COR # BLD AUTO: 23.78 10*3/MM3 (ref 3.4–10.8)
WBC NRBC COR # BLD AUTO: 28.34 10*3/MM3 (ref 3.4–10.8)

## 2024-01-22 PROCEDURE — 97535 SELF CARE MNGMENT TRAINING: CPT

## 2024-01-22 PROCEDURE — 85027 COMPLETE CBC AUTOMATED: CPT | Performed by: STUDENT IN AN ORGANIZED HEALTH CARE EDUCATION/TRAINING PROGRAM

## 2024-01-22 PROCEDURE — 85025 COMPLETE CBC W/AUTO DIFF WBC: CPT | Performed by: INTERNAL MEDICINE

## 2024-01-22 PROCEDURE — 90791 PSYCH DIAGNOSTIC EVALUATION: CPT

## 2024-01-22 PROCEDURE — 94799 UNLISTED PULMONARY SVC/PX: CPT

## 2024-01-22 PROCEDURE — 97530 THERAPEUTIC ACTIVITIES: CPT

## 2024-01-22 PROCEDURE — 94760 N-INVAS EAR/PLS OXIMETRY 1: CPT

## 2024-01-22 PROCEDURE — 99232 SBSQ HOSP IP/OBS MODERATE 35: CPT | Performed by: INTERNAL MEDICINE

## 2024-01-22 PROCEDURE — 71250 CT THORAX DX C-: CPT

## 2024-01-22 PROCEDURE — 97166 OT EVAL MOD COMPLEX 45 MIN: CPT

## 2024-01-22 PROCEDURE — 25010000002 FUROSEMIDE PER 20 MG: Performed by: INTERNAL MEDICINE

## 2024-01-22 PROCEDURE — 84550 ASSAY OF BLOOD/URIC ACID: CPT | Performed by: INTERNAL MEDICINE

## 2024-01-22 PROCEDURE — 97162 PT EVAL MOD COMPLEX 30 MIN: CPT

## 2024-01-22 PROCEDURE — 80048 BASIC METABOLIC PNL TOTAL CA: CPT | Performed by: INTERNAL MEDICINE

## 2024-01-22 PROCEDURE — 94761 N-INVAS EAR/PLS OXIMETRY MLT: CPT

## 2024-01-22 RX ORDER — ECHINACEA PURPUREA EXTRACT 125 MG
2 TABLET ORAL AS NEEDED
Status: DISCONTINUED | OUTPATIENT
Start: 2024-01-22 | End: 2024-01-24 | Stop reason: HOSPADM

## 2024-01-22 RX ORDER — FUROSEMIDE 10 MG/ML
40 INJECTION INTRAMUSCULAR; INTRAVENOUS EVERY 12 HOURS
Status: DISCONTINUED | OUTPATIENT
Start: 2024-01-22 | End: 2024-01-23

## 2024-01-22 RX ADMIN — Medication 10 ML: at 08:34

## 2024-01-22 RX ADMIN — SALINE NASAL SPRAY 2 SPRAY: 1.5 SOLUTION NASAL at 21:28

## 2024-01-22 RX ADMIN — BUPROPION HYDROCHLORIDE 300 MG: 300 TABLET, EXTENDED RELEASE ORAL at 08:33

## 2024-01-22 RX ADMIN — METHOCARBAMOL TABLETS 750 MG: 750 TABLET, COATED ORAL at 17:05

## 2024-01-22 RX ADMIN — Medication 1 TABLET: at 08:33

## 2024-01-22 RX ADMIN — LORAZEPAM 1 MG: 1 TABLET ORAL at 21:28

## 2024-01-22 RX ADMIN — CARVEDILOL 6.25 MG: 6.25 TABLET, FILM COATED ORAL at 08:33

## 2024-01-22 RX ADMIN — NITROGLYCERIN 1 INCH: 20 OINTMENT TOPICAL at 08:33

## 2024-01-22 RX ADMIN — METHOCARBAMOL TABLETS 750 MG: 750 TABLET, COATED ORAL at 08:33

## 2024-01-22 RX ADMIN — FLUTICASONE PROPIONATE 1 SPRAY: 50 SPRAY, METERED NASAL at 08:33

## 2024-01-22 RX ADMIN — Medication 10 ML: at 21:29

## 2024-01-22 RX ADMIN — CARVEDILOL 6.25 MG: 6.25 TABLET, FILM COATED ORAL at 17:05

## 2024-01-22 RX ADMIN — LORAZEPAM 1 MG: 1 TABLET ORAL at 04:47

## 2024-01-22 RX ADMIN — Medication 5 MG: at 02:51

## 2024-01-22 RX ADMIN — FUROSEMIDE 40 MG: 40 TABLET ORAL at 08:33

## 2024-01-22 RX ADMIN — RIVAROXABAN 20 MG: 20 TABLET, FILM COATED ORAL at 08:33

## 2024-01-22 RX ADMIN — LISINOPRIL 5 MG: 5 TABLET ORAL at 08:33

## 2024-01-22 RX ADMIN — BUDESONIDE AND FORMOTEROL FUMARATE DIHYDRATE 2 PUFF: 160; 4.5 AEROSOL RESPIRATORY (INHALATION) at 07:36

## 2024-01-22 RX ADMIN — METHOCARBAMOL TABLETS 750 MG: 750 TABLET, COATED ORAL at 21:27

## 2024-01-22 RX ADMIN — FUROSEMIDE 40 MG: 10 INJECTION, SOLUTION INTRAMUSCULAR; INTRAVENOUS at 17:05

## 2024-01-22 RX ADMIN — Medication 5 MG: at 21:28

## 2024-01-22 RX ADMIN — Medication 100 MG: at 08:33

## 2024-01-22 NOTE — PLAN OF CARE
Goal Outcome Evaluation:  Plan of Care Reviewed With: patient        Progress: improving  Outcome Evaluation: VSS. Pt still in Afib but better controlled. Pt a&o but forgetful. Covid was neg out of isolation. Worked with PT. CT of chest done. WBC elevated. CIWAS continue. IV lasix started. Will continue to monitor.

## 2024-01-22 NOTE — OR NURSING
Pt continues on PO lasix,good UOP.Some restlessness/anxiety this shift.PRN ativan given,effective.Afib on tele,rates controlled.Safety maintained.WCTM.

## 2024-01-22 NOTE — PLAN OF CARE
Goal Outcome Evaluation:  Plan of Care Reviewed With: patient              Patient is a 73 y.o. male admitted to [facility] for afib with RVR and hypoxia on 1/20/2024. PMHx includes alcohol abuse, afib, CHF. Patient is ind at baseline without use of AD and lives with his spouse. Stair lift within home and no ROSSANA home. Today, patient performed bed mobility with SBA, required SBA for transfers, and ambulated 40ft without use of AD requiring CGA then progressing to SBA. Pt ambulated on supp O2 and denies SOA during activity today. Pt UIC with chair alarm. Balance and activity tolerance deficits noted. Patient may benefit from skilled PT services to address functional deficits and improve level of independence prior to discharge. Anticipate home with spouse upon DC.

## 2024-01-22 NOTE — PROGRESS NOTES
Name: Jered Morejon ADMIT: 2024   : 1950  PCP: Provider, No Known    MRN: [id number] LOS: 2 days   AGE/SEX: 73 y.o. male  ROOM: [facility]     Subjective   Subjective   Examined at bedside. EF 32% on TTE.  Anxious after I told him this.      Objective   Objective   Vital Signs  Temp:  [97.5 °F (36.4 °C)-98.2 °F (36.8 °C)] 97.5 °F (36.4 °C)  Heart Rate:  [] 88  Resp:  [18-22] 18  BP: (112-128)/(58-83) 112/75  SpO2:  [95 %-99 %] 98 %  on  Flow (L/min):  [2] 2;   Device (Oxygen Therapy): room air    Intake/Output Summary (Last 24 hours) at 2024 1224  Last data filed at 2024 1100  Gross per 24 hour   Intake 957 ml   Output 1750 ml   Net -793 ml     Body mass index is 26.51 kg/m².      24  2227 24  0517 24  0500   Weight: 81.8 kg (180 lb 6.4 oz) 80.1 kg (176 lb 8 oz) 79.1 kg (174 lb 4.8 oz)     Physical Exam  Constitutional:       General: He is not in acute distress.  HENT:      Head: Normocephalic and atraumatic.   Cardiovascular:      Rate and Rhythm: Normal rate. Rhythm irregular.   Pulmonary:      Effort: Pulmonary effort is normal. No respiratory distress.      Comments: Diminished bilaterally   Abdominal:      General: There is no distension.      Palpations: Abdomen is soft.      Tenderness: There is no abdominal tenderness.   Skin:     General: Skin is warm and dry.   Neurological:      General: No focal deficit present.      Mental Status: He is alert and oriented to person, place, and time.       .    Results Review:      Results from last 7 days   Lab Units 24  0408 24  1124 24  1934   SODIUM mmol/L 138 139 137   POTASSIUM mmol/L 3.9 4.1 4.8   CHLORIDE mmol/L 100 98 101   CO2 mmol/L 26.8 26.4 24.1   BUN mg/dL 20 17 15   CREATININE mg/dL 1.07 1.01 0.96   GLUCOSE mg/dL 116* 236* 158*   CALCIUM mg/dL 8.7 9.1 9.2   AST (SGOT) U/L  --   --  18   ALT (SGPT) U/L  --   --  27     Estimated Creatinine Clearance: 68.8 mL/min (by C-G formula based on SCr  "of 1.07 mg/dL).  Results from last 7 days   Lab Units 01/21/24  1340   HEMOGLOBIN A1C % 6.10*         Results from last 7 days   Lab Units 01/20/24  2137 01/20/24  1934   HSTROP T ng/L 38* 35*     Results from last 7 days   Lab Units 01/21/24  1124   PROBNP pg/mL 3,524.0*                   Invalid input(s): \"LDLCALC\"  Results from last 7 days   Lab Units 01/22/24  1048 01/22/24  0408 01/21/24  0312 01/20/24  1934   WBC 10*3/mm3 23.78* 28.34* 13.87* 18.55*   HEMOGLOBIN g/dL 12.3* 13.0 14.5 14.1   HEMATOCRIT % 38.2 39.1 43.0 43.0   PLATELETS 10*3/mm3 453* 427 405 457*   MCV fL 87.8 88.7 88.5 91.1   MCH pg 28.3 29.5 29.8 29.9   MCHC g/dL 32.2 33.2 33.7 32.8   RDW % 11.6* 11.9* 11.7* 12.1*   RDW-SD fl 36.6* 38.5 37.5 39.5   MPV fL 10.5 10.7 10.9 10.5   NEUTROPHIL % %  --  83.9* 94.0* 82.2*   LYMPHOCYTE % %  --  5.7* 3.9* 8.5*   MONOCYTES % %  --  9.1 1.4* 7.9   EOSINOPHIL % %  --  0.0* 0.1* 0.5   BASOPHIL % %  --  0.1 0.1 0.3   IMM GRAN % %  --  1.2* 0.5 0.6*   NEUTROS ABS 10*3/mm3  --  23.75* 13.04* 15.23*   LYMPHS ABS 10*3/mm3  --  1.62 0.54* 1.58   MONOS ABS 10*3/mm3  --  2.58* 0.20 1.47*   EOS ABS 10*3/mm3  --  0.01 0.01 0.09   BASOS ABS 10*3/mm3  --  0.04 0.01 0.06   IMMATURE GRANS (ABS) 10*3/mm3  --  0.34* 0.07* 0.12*   NRBC /100 WBC  --  0.0 0.0 0.1             Results from last 7 days   Lab Units 01/20/24  1934   PROCALCITONIN ng/mL 0.07   LACTATE mmol/L 1.4             Results from last 7 days   Lab Units 01/20/24 2012 01/20/24  1934   BLOODCX  No growth at 24 hours No growth at 24 hours     Results from last 7 days   Lab Units 01/20/24  1948   ADENOVIRUS DETECTION BY PCR  Not Detected   CORONAVIRUS 229E  Not Detected   CORONAVIRUS HKU1  Not Detected   CORONAVIRUS NL63  Not Detected   CORONAVIRUS OC43  Not Detected   HUMAN METAPNEUMOVIRUS  Not Detected   HUMAN RHINOVIRUS/ENTEROVIRUS  Not Detected   INFLUENZA B PCR  Not Detected   PARAINFLUENZA 1  Not Detected   PARAINFLUENZA VIRUS 2  Not Detected "   PARAINFLUENZA VIRUS 3  Not Detected   PARAINFLUENZA VIRUS 4  Not Detected   BORDETELLA PERTUSSIS PCR  Not Detected   CHLAMYDOPHILA PNEUMONIAE PCR  Not Detected   MYCOPLAMA PNEUMO PCR  Not Detected   INFLUENZA A PCR  Not Detected   RSV, PCR  Not Detected         Results from last 7 days   Lab Units 01/22/24  0408   URIC ACID mg/dL 6.1       Imaging:  Imaging Results (Last 24 Hours)       ** No results found for the last 24 hours. **               I reviewed the patient's new clinical results / labs / tests / procedures      Assessment/Plan     Active Hospital Problems    Diagnosis  POA    Cardiomyopathy, dilated [I42.0]  Unknown    Pulmonary emphysema [J43.9]  Yes    Essential hypertension [I10]  Yes    Leukocytosis [D72.829]  Yes    Alcohol use [Z78.9]  Yes    Longstanding persistent atrial fibrillation [I48.11]  Yes    Acute systolic (congestive) heart failure [I50.21]  Yes    Atrial fibrillation with RVR [I48.91]  Yes      Resolved Hospital Problems   No resolved problems to display.       Acute systolic congestive heart failure exacerbation (new onset)/rapid A-fib in a patient with a history of hypertension/chronic diastolic congestive heart failure/A-fib.  2D echo with an ejection fraction of 32%, left ventricular hypertrophy, left atrial enlargement, moderate MR, multiple hypokinetic areas.  Cardiology following for ischemic work-up. Continue anticoagulation with Xarelto.      History of COPD/recent pneumonia status posttreatment.  Procalcitonin and lactic acid are normal.  There is no fever.  Respiratory PCR is negative.  White count remains elevated.  Chest x-ray showed worsening bilateral opacities.  Will obtain CT of the chest without contrast.    Alcohol abuse.  No withdrawals clinically. On CIWA protocol and detoxification.    Leukocytosis/thrombocytosis.  Leukocytosis improving.  Thrombocytosis resolved.  Will monitor.    Hyperglycemia.  Will check A1c.    VTE prophylaxis.  Patient  anticoagulated.    Eusebio Mjeia MD  [facility]  01/22/24  12:24 EST

## 2024-01-22 NOTE — PLAN OF CARE
Problem: Adult Inpatient Plan of Care  Goal: Plan of Care Review  Outcome: Ongoing, Progressing  Flowsheets (Taken 1/21/2024 8432)  Progress: no change  Plan of Care Reviewed With: patient  Outcome Evaluation: ECHO completed early this morning. Cardiology consulted and is now following. BP/cardiac medications adjusted per attending MD and cardiologist. Patient remains on 2 L of oxygen. Patient's spouse spoke with RN for an update on plan of care. No acute signs of alcohol withdrawal - CIWAs ranged from 1 to 2.

## 2024-01-22 NOTE — PROGRESS NOTES
"   LOS: 2 days   Patient Care Team:  Provider, No Known as PCP - General    Chief Complaint: SOA     Interval History: Still SOA, weak. + cough. On 2L O2. Has had ~4L UOP. No chest pain.       Objective   Vital Signs  Temp:  [97.5 °F (36.4 °C)-98.2 °F (36.8 °C)] 97.5 °F (36.4 °C)  Heart Rate:  [] 88  Resp:  [18-20] 18  BP: (112-128)/(58-83) 112/75    Intake/Output Summary (Last 24 hours) at 1/22/2024 1313  Last data filed at 1/22/2024 1100  Gross per 24 hour   Intake 957 ml   Output 1750 ml   Net -793 ml       Last Weight and Admission Weight        01/22/24  0500   Weight: 79.1 kg (174 lb 4.8 oz)     Flowsheet Rows      Flowsheet Row First Filed Value   Admission Height 172.7 cm (67.99\") Documented at 01/21/2024 0700   Admission Weight 81.8 kg (180 lb 6.4 oz) Documented at 01/20/2024 2227                    Physical Exam  HENT:      Head: Normocephalic.   Eyes:      Conjunctiva/sclera: Conjunctivae normal.   Cardiovascular:      Rate and Rhythm: Tachycardia present. Rhythm irregular.   Pulmonary:      Effort: Pulmonary effort is normal.      Breath sounds: Rales present.   Abdominal:      Palpations: Abdomen is soft.   Musculoskeletal:         General: No swelling.   Neurological:      General: No focal deficit present.         Results Review:      Results from last 7 days   Lab Units 01/22/24  0408 01/21/24  1124 01/20/24  1934   SODIUM mmol/L 138 139 137   POTASSIUM mmol/L 3.9 4.1 4.8   CHLORIDE mmol/L 100 98 101   CO2 mmol/L 26.8 26.4 24.1   BUN mg/dL 20 17 15   CREATININE mg/dL 1.07 1.01 0.96   GLUCOSE mg/dL 116* 236* 158*   CALCIUM mg/dL 8.7 9.1 9.2     Results from last 7 days   Lab Units 01/20/24  2137 01/20/24  1934   HSTROP T ng/L 38* 35*     Results from last 7 days   Lab Units 01/22/24  1048 01/22/24  0408 01/21/24  0312   WBC 10*3/mm3 23.78* 28.34* 13.87*   HEMOGLOBIN g/dL 12.3* 13.0 14.5   HEMATOCRIT % 38.2 39.1 43.0   PLATELETS 10*3/mm3 453* 427 405                     I reviewed the patient's " new clinical results.  I personally viewed and interpreted the patient's EKG/Telemetry data        Medication Review:   buPROPion XL, 300 mg, Oral, Daily  carvedilol, 6.25 mg, Oral, BID With Meals  fluticasone, 1 spray, Nasal, Daily  furosemide, 40 mg, Oral, BID  lisinopril, 5 mg, Oral, Q24H  methocarbamol, 750 mg, Oral, TID  multivitamin, 1 tablet, Oral, Daily  nitroglycerin, 1 inch, Topical, TID - Nitrates  rivaroxaban, 20 mg, Oral, Daily  sodium chloride, 10 mL, Intravenous, Q12H  thiamine, 100 mg, Oral, Daily      Assessment & Plan     1. Acute on chronic HFrEF  2. History of NICM with previous recovery  3. Alcohol overuse, unclear how much  4. HTN  5. Recent PNA  6. Leukocytosis  7. Chronic atrial fibrillation  8. COPD    I reviewed his echo, and his EF varies from beat to beat due to atrial fibrillation. He was recently treated for PNA, and his WBC is up and his CXR shows patchy infiltrates. I think his EF is actually ~40-45%. I can't review the images from 2016.     Continue carvedilol (he previously reported adverse side effects with metoprolol succinate), avoid CCB due to CMP. Change lisinopril to sacubitril-valsartan. Continue rivaroxaban and digoxin. Switch to IV furosemide.     Optimize GDMT; no immediate plans for ischemic evaluation. Will d/w Dr Washington, patient's usual cardiologist.     Agree with chest CT.     Anmol Rivera MD  01/22/24  13:13 EST

## 2024-01-22 NOTE — THERAPY EVALUATION
Patient Name: Jered Morejon  : 1950    MRN: [id number]                              Today's Date: 2024       Admit Date: 2024    Visit Dx:     ICD-10-CM ICD-9-CM   1. Atrial fibrillation with RVR  I48.91 427.31   2. Acute congestive heart failure, unspecified heart failure type  I50.9 428.0   3. Hypoxia  R09.02 799.02     Patient Active Problem List   Diagnosis    Atrial fibrillation with RVR    Pulmonary emphysema    Essential hypertension    Leukocytosis    Alcohol use    Longstanding persistent atrial fibrillation    Acute systolic (congestive) heart failure    Cardiomyopathy, dilated     Past Medical History:   Diagnosis Date    Atrial fibrillation     Hypertension     Renal infarct      Past Surgical History:   Procedure Laterality Date    CARDIAC CATHETERIZATION      CARDIOVERSION        General Information       Row Name 24 1137          Physical Therapy Time and Intention    Document Type evaluation  -CB     Mode of Treatment individual therapy;physical therapy  -CB       Row Name 24 1137          General Information    Patient Profile Reviewed yes  -CB     Prior Level of Function independent:;gait;transfer;bed mobility  no AD at baseline  -CB     Existing Precautions/Restrictions fall;oxygen therapy device and L/min  -CB     Barriers to Rehab none identified  -CB       Row Name 24 1137          Living Environment    People in Home spouse  -CB       Row Name 24 1137          Home Main Entrance    Number of Stairs, Main Entrance none  -CB       Row Name 24 1137          Stairs Within Home, Primary    Stairs, Within Home, Primary stair lift within home  -CB       Row Name 24 1137          Cognition    Orientation Status (Cognition) oriented x 3  -CB       Row Name 24 1137          Safety Issues, Functional Mobility    Impairments Affecting Function (Mobility) endurance/activity tolerance;balance  -CB               User Key  (r) = Recorded By, (t) =  Taken By, (c) = Cosigned By      Initials Name Provider Type    CB Lucila Alford, PT Physical Therapist                   Mobility       Row Name 01/22/24 1137          Bed Mobility    Bed Mobility supine-sit  -CB     All Activities, [place] (Bed Mobility) standby assist  -CB       Row Name 01/22/24 1137          Sit-Stand Transfer    Sit-Stand [place] (Transfers) standby assist  -CB     Assistive Device (Sit-Stand Transfers) --  no AD  -CB     Comment, (Sit-Stand Transfer) unable to stand 1st attempt  -CB       Row Name 01/22/24 1137          Gait/Stairs (Locomotion)    [place] Level (Gait) contact guard;standby assist;verbal cues  -CB     Assistive Device (Gait) --  no AD  -CB     Distance in Feet (Gait) 40ft  -CB     Deviations/Abnormal Patterns (Gait) gait speed decreased;stride length decreased;lupis decreased  -CB     Bilateral Gait Deviations heel strike decreased  -CB     Comment, (Gait/Stairs) unsteady for initial 20ft then increased steadiness; standing rest at 20ft; PT to manage O2 line  -CB               User Key  (r) = Recorded By, (t) = Taken By, (c) = Cosigned By      Initials Name Provider Type    Lucila Stearns, PT Physical Therapist                   Obj/Interventions       Row Name 01/22/24 1139          Range of Motion Comprehensive    General Range of Motion bilateral lower extremity ROM WFL  -CB       Row Name 01/22/24 1139          Strength Comprehensive (MMT)    General Manual Muscle Testing (MMT) Assessment no strength deficits identified  -CB       Row Name 01/22/24 1139          Balance    Balance Assessment standing static balance;standing dynamic balance  -CB     Static Standing Balance contact guard;standby assist  -CB     Dynamic Standing Balance contact guard;standby assist  -CB     Position/Device Used, Standing Balance unsupported  -CB     Balance Interventions sitting;standing;sit to stand;supported;static;dynamic;minimal challenge  -CB       Row Name 01/22/24  1139          Sensory Assessment (Somatosensory)    Sensory Assessment (Somatosensory) sensation intact  -CB               User Key  (r) = Recorded By, (t) = Taken By, (c) = Cosigned By      Initials Name Provider Type    CB Lucila Alford, PT Physical Therapist                   Goals/Plan       Row Name 01/22/24 1140          Bed Mobility Goal 1 (PT)    Activity/Assistive Device (Bed Mobility Goal 1, PT) bed mobility activities, all  -CB     [place] Level/Cues Needed (Bed Mobility Goal 1, PT) modified independence  -CB     Time Frame (Bed Mobility Goal 1, PT) long term goal (LTG);1 week  -CB       Row Name 01/22/24 1140          Transfer Goal 1 (PT)    Activity/Assistive Device (Transfer Goal 1, PT) sit-to-stand/stand-to-sit;bed-to-chair/chair-to-bed  -CB     [place] Level/Cues Needed (Transfer Goal 1, PT) modified independence  -CB     Time Frame (Transfer Goal 1, PT) long term goal (LTG);1 week  -CB       Row Name 01/22/24 1140          Gait Training Goal 1 (PT)    Activity/Assistive Device (Gait Training Goal 1, PT) gait (walking locomotion);improve balance and speed;increase endurance/gait distance;decrease fall risk;diminish gait deviation  -CB     [place] Level (Gait Training Goal 1, PT) modified independence  -CB     Distance (Gait Training Goal 1, PT) 150ft  -CB     Time Frame (Gait Training Goal 1, PT) long term goal (LTG);1 week  -CB       Row Name 01/22/24 1140          Therapy Assessment/Plan (PT)    Planned Therapy Interventions (PT) balance training;bed mobility training;gait training;home exercise program;patient/family education;transfer training;strengthening  -CB               User Key  (r) = Recorded By, (t) = Taken By, (c) = Cosigned By      Initials Name Provider Type    CB Lucila Alford, PT Physical Therapist                   Clinical Impression       Row Name 01/22/24 1140          Pain    Pretreatment Pain Rating 0/10 - no pain  -CB       Row Name 01/22/24 1140           Plan of Care Review    Plan of Care Reviewed With patient  -CB     Outcome Evaluation Patient is a 73 y.o. male admitted to [facility] for afib with RVR and hypoxia on 1/20/2024. PMHx includes alcohol abuse, afib, CHF. Patient is ind at baseline without use of AD and lives with his spouse. Stair lift within home and no ROSSANA home. Today, patient performed bed mobility with SBA, required SBA for transfers, and ambulated 40ft without use of AD requiring CGA then progressing to SBA. Pt ambulated on supp O2 and denies SOA during activity today. Pt UIC with chair alarm. Balance and activity tolerance deficits noted. Patient may benefit from skilled PT services to address functional deficits and improve level of independence prior to discharge. Anticipate home with spouse upon DC.  -CB       Row Name 01/22/24 1140          Therapy Assessment/Plan (PT)    Rehab Potential (PT) good, to achieve stated therapy goals  -CB     Criteria for Skilled Interventions Met (PT) yes  -CB     Therapy Frequency (PT) 5 times/wk  -CB       Row Name 01/22/24 1140          Vital Signs    Pre SpO2 (%) 100  -CB     O2 Delivery Pre Treatment supplemental O2  -CB     O2 Delivery Intra Treatment supplemental O2  -CB     Post SpO2 (%) 100  -CB     O2 Delivery Post Treatment supplemental O2  -CB       Row Name 01/22/24 1140          Positioning and Restraints    Pre-Treatment Position in bed  -CB     Post Treatment Position chair  -CB     In Chair notified nsg;reclined;call light within reach;exit alarm on;encouraged to call for assist  -CB               User Key  (r) = Recorded By, (t) = Taken By, (c) = Cosigned By      Initials Name Provider Type    CB Lucila Alford, PT Physical Therapist                   Outcome Measures       Row Name 01/22/24 1141          How much help from another person do you currently need...    Turning from your back to your side while in flat bed without using bedrails? 4  -CB     Moving from lying on back to sitting on the  side of a flat bed without bedrails? 3  -CB     Moving to and from a bed to a chair (including a wheelchair)? 3  -CB     Standing up from a chair using your arms (e.g., wheelchair, bedside chair)? 3  -CB     Climbing 3-5 steps with a railing? 2  -CB     To walk in hospital room? 3  -CB     AM-PAC 6 Clicks Score (PT) 18  -CB     Highest Level of Mobility Goal 6 --> Walk 10 steps or more  -CB       Row Name 01/22/24 1141 01/22/24 1057       Functional Assessment    Outcome Measure Options AM-PAC 6 Clicks Basic Mobility (PT)  -CB AM-PAC 6 Clicks Daily Activity (OT)  -KN              User Key  (r) = Recorded By, (t) = Taken By, (c) = Cosigned By      Initials Name Provider Type    CB Lucila Alford, PT Physical Therapist    Wilfrid Vogel, OT Occupational Therapist                                 Physical Therapy Education       Title: PT OT SLP Therapies (In Progress)       Topic: Physical Therapy (In Progress)       Point: Mobility training (Done)       Learning Progress Summary             Patient Acceptance, E,TB, VU,NR by CB at 1/22/2024 1142                         Point: Home exercise program (Not Started)       Learner Progress:  Not documented in this visit.              Point: Body mechanics (Not Started)       Learner Progress:  Not documented in this visit.              Point: Precautions (Not Started)       Learner Progress:  Not documented in this visit.                              User Key       Initials Effective Dates Name Provider Type Discipline    CB 10/22/21 -  Lucila Alford, PT Physical Therapist PT                  PT Recommendation and Plan  Planned Therapy Interventions (PT): balance training, bed mobility training, gait training, home exercise program, patient/family education, transfer training, strengthening  Plan of Care Reviewed With: patient  Outcome Evaluation: Patient is a 73 y.o. male admitted to [facility] for afib with RVR and hypoxia on 1/20/2024. PMHx includes alcohol abuse, afib, CHF.  Patient is ind at baseline without use of AD and lives with his spouse. Stair lift within home and no ROSSANA home. Today, patient performed bed mobility with SBA, required SBA for transfers, and ambulated 40ft without use of AD requiring CGA then progressing to SBA. Pt ambulated on supp O2 and denies SOA during activity today. Pt UIC with chair alarm. Balance and activity tolerance deficits noted. Patient may benefit from skilled PT services to address functional deficits and improve level of independence prior to discharge. Anticipate home with spouse upon DC.     Time Calculation:         PT Charges       Row Name 01/22/24 1144             Time Calculation    Start Time 1102  -CB      Stop Time 1116  -CB      Time Calculation (min) 14 min  -CB      PT Received On 01/22/24  -CB      PT - Next Appointment 01/23/24  -CB      PT Goal Re-Cert Due Date 01/29/24  -CB         Time Calculation- PT    Total Timed Code Minutes- PT 8 minute(s)  -CB         Timed Charges    39593 - PT Therapeutic Activity Minutes 8  -CB         Total Minutes    Timed Charges Total Minutes 8  -CB       Total Minutes 8  -CB                User Key  (r) = Recorded By, (t) = Taken By, (c) = Cosigned By      Initials Name Provider Type    CB Lucila Alford, PT Physical Therapist                  Therapy Charges for Today       Code Description Service Date Service Provider Modifiers Qty    75227332305 HC PT THERAPEUTIC ACT EA 15 MIN 1/22/2024 Lucila Alford, PT GP 1    80623475823 HC PT EVAL MOD COMPLEXITY 2 1/22/2024 Lucila Alford, PT GP 1            PT G-Codes  Outcome Measure Options: AM-PAC 6 Clicks Basic Mobility (PT)  AM-PAC 6 Clicks Score (PT): 18  AM-PAC 6 Clicks Score (OT): 21  PT Discharge Summary  Anticipated Discharge Disposition (PT): home with assist    Lucila Alford PT  1/22/2024

## 2024-01-22 NOTE — THERAPY EVALUATION
Patient Name: Jered Morejon  : 1950    MRN: [id number]                              Today's Date: 2024       Admit Date: 2024    Visit Dx:     ICD-10-CM ICD-9-CM   1. Atrial fibrillation with RVR  I48.91 427.31   2. Acute congestive heart failure, unspecified heart failure type  I50.9 428.0   3. Hypoxia  R09.02 799.02     Patient Active Problem List   Diagnosis    Atrial fibrillation with RVR    Pulmonary emphysema    Essential hypertension    Leukocytosis    Alcohol use    Longstanding persistent atrial fibrillation    Acute systolic (congestive) heart failure    Cardiomyopathy, dilated     Past Medical History:   Diagnosis Date    Atrial fibrillation     Hypertension     Renal infarct      Past Surgical History:   Procedure Laterality Date    CARDIAC CATHETERIZATION      CARDIOVERSION        General Information       Row Name 24 1025          OT Time and Intention    Document Type evaluation  -KN     Mode of Treatment individual therapy;occupational therapy  -       Row Name 24 1025          General Information    Patient Profile Reviewed yes  -KN     Prior Level of Function independent:;ADL's;all household mobility  -KN     Existing Precautions/Restrictions no known precautions/restrictions  -KN     Barriers to Rehab none identified  -       Row Name 24 1025          Living Environment    People in Home spouse  -       Row Name 24 1025          Cognition    Orientation Status (Cognition) oriented x 3  -KN               User Key  (r) = Recorded By, (t) = Taken By, (c) = Cosigned By      Initials Name Provider Type    Wilfrid Vogel OT Occupational Therapist                     Mobility/ADL's       Row Name 24 1051          Bed Mobility    Bed Mobility bed mobility (all) activities  -MANUELA     All Activities, [place] (Bed Mobility) standby assist  -       Row Name 24 1051          Transfers    Transfers toilet transfer;sit-stand transfer;stand-sit  transfer  -[facility] Name 01/22/24 1051          Sit-Stand Transfer    Sit-Stand [place] (Transfers) standby assist  -     Assistive Device (Sit-Stand Transfers) --  none  -[facility] Name 01/22/24 1051          Stand-Sit Transfer    Stand-Sit [place] (Transfers) standby assist  -KN     Assistive Device (Stand-Sit Transfers) other (see comments)  none  -[facility] Name 01/22/24 1051          Toilet Transfer    Type (Toilet Transfer) sit-stand;stand-sit  -KN     [place] Level (Toilet Transfer) standby assist  -KN     Assistive Device (Toilet Transfer) commode  -[facility] Name 01/22/24 1051          Activities of Daily Living    BADL Assessment/Intervention lower body dressing;grooming;toileting  -KN       Row Name 01/22/24 1051          Lower Body Dressing Assessment/Training    [place] Level (Lower Body Dressing) lower body dressing skills;doff;don;socks;set up  -     Position (Lower Body Dressing) supported sitting  -KN       Row Name 01/22/24 1051          Grooming Assessment/Training    [place] Level (Grooming) grooming skills;standby assist  -     Position (Grooming) sink side  -KN       Row Name 01/22/24 1051          Toileting Assessment/Training    [place] Level (Toileting) toileting skills;standby assist  -               User Key  (r) = Recorded By, (t) = Taken By, (c) = Cosigned By      Initials Name Provider Type    Wilfrid Vogel OT Occupational Therapist                   Obj/Interventions       [facility] Name 01/22/24 1053          Sensory Assessment (Somatosensory)    Sensory Assessment (Somatosensory) UE sensation intact  -KN       Row Name 01/22/24 1053          Range of Motion Comprehensive    General Range of Motion no range of motion deficits identified  -KN       Row Name 01/22/24 1053          Strength Comprehensive (MMT)    General Manual Muscle Testing (MMT) Assessment no strength deficits identified  -               User Key  (r) = Recorded By,  (t) = Taken By, (c) = Cosigned By      Initials Name Provider Type    Wilfrid Vogel, OT Occupational Therapist                   Goals/Plan       Row Name 01/22/24 1056          Bed Mobility Goal 1 (OT)    Activity/Assistive Device (Bed Mobility Goal 1, OT) bed mobility activities, all  -KN     [place] Level/Cues Needed (Bed Mobility Goal 1, OT) modified independence  -KN     Time Frame (Bed Mobility Goal 1, OT) short term goal (STG);2 weeks  -       Row Name 01/22/24 1056          Transfer Goal 1 (OT)    Activity/Assistive Device (Transfer Goal 1, OT) transfers, all  -KN     [place] Level/Cues Needed (Transfer Goal 1, OT) modified independence  -KN     Time Frame (Transfer Goal 1, OT) short term goal (STG);2 weeks  -[facility] Name 01/22/24 1056          Bathing Goal 1 (OT)    Activity/Device (Bathing Goal 1, OT) bathing skills, all  -KN     [place] Level/Cues Needed (Bathing Goal 1, OT) modified independence  -KN     Time Frame (Bathing Goal 1, OT) short term goal (STG);2 weeks  -[facility] Name 01/22/24 1056          Dressing Goal 1 (OT)    Activity/Device (Dressing Goal 1, OT) dressing skills, all  -KN     [place]/Cues Needed (Dressing Goal 1, OT) modified independence  -KN     Time Frame (Dressing Goal 1, OT) short term goal (STG);2 weeks  -[facility] Name 01/22/24 1056          Therapy Assessment/Plan (OT)    Planned Therapy Interventions (OT) activity tolerance training;BADL retraining;functional balance retraining;occupation/activity based interventions;transfer/mobility retraining;strengthening exercise;patient/caregiver education/training  -               User Key  (r) = Recorded By, (t) = Taken By, (c) = Cosigned By      Initials Name Provider Type    Wilfrid Vogel, OT Occupational Therapist                   Clinical Impression       Row Name 01/22/24 1053          Pain Assessment    Pretreatment Pain Rating 0/10 - no pain  -KN     Posttreatment Pain Rating 0/10 - no  pain  -       Row Name 01/22/24 1053          Plan of Care Review    Plan of Care Reviewed With patient  -     Outcome Evaluation Pt is a 73 year old male admitted to [facility] on 1/20 for SOA. Pt reports that he lives with his wife and is ind. with ADLs and uses no AD for mobility. Pt up with nursing upon arrival to room. Today pt SBA for commode tf, SBA for toileting, SBA for mobility around the room, TORRES for LB dressing, and SBA for grooming task standing at sink. Pt may benefit from skilled OT services to improve overall safety before returning home.  -       Row Name 01/22/24 1053          Therapy Assessment/Plan (OT)    Rehab Potential (OT) good, to achieve stated therapy goals  -     Criteria for Skilled Therapeutic Interventions Met (OT) yes;meets criteria;skilled treatment is necessary  -     Therapy Frequency (OT) 3 times/wk  -       Row Name 01/22/24 1053          Therapy Plan Review/Discharge Plan (OT)    Anticipated Discharge Disposition (OT) home  -[facility] Name 01/22/24 1053          Positioning and Restraints    Pre-Treatment Position standing in room  -     Post Treatment Position bed  -KN     In Bed notified nsg;fowlers;call light within reach;encouraged to call for assist;exit alarm on  -               User Key  (r) = Recorded By, (t) = Taken By, (c) = Cosigned By      Initials Name Provider Type    Wilfrid Vogel, OT Occupational Therapist                   Outcome Measures       Row Name 01/22/24 1051          How much help from another is currently needed...    Putting on and taking off regular lower body clothing? 3  -KN     Bathing (including washing, rinsing, and drying) 3  -KN     Toileting (which includes using toilet bed pan or urinal) 3  -KN     Putting on and taking off regular upper body clothing 4  -KN     Taking care of personal grooming (such as brushing teeth) 4  -KN     Eating meals 4  -KN     AM-PAC 6 Clicks Score (OT) 21  -       Row Name 01/22/24 105           Functional Assessment    Outcome Measure Options AM-PAC 6 Clicks Daily Activity (OT)  -MANUELA               User Key  (r) = Recorded By, (t) = Taken By, (c) = Cosigned By      Initials Name Provider Type    KN Wilfrid Valdez OT Occupational Therapist                    Occupational Therapy Education       Title: PT OT SLP Therapies (Done)       Topic: Occupational Therapy (Done)       Point: ADL training (Done)       Description:   Instruct learner(s) on proper safety adaptation and remediation techniques during self care or transfers.   Instruct in proper use of assistive devices.                  Learning Progress Summary             Patient Acceptance, E, VU by MANUELA at 1/22/2024 1057                                         User Key       Initials Effective Dates Name Provider Type Discipline    MANUELA 08/02/22 -  Wilfrid Valdez OT Occupational Therapist OT                  OT Recommendation and Plan  Planned Therapy Interventions (OT): activity tolerance training, BADL retraining, functional balance retraining, occupation/activity based interventions, transfer/mobility retraining, strengthening exercise, patient/caregiver education/training  Therapy Frequency (OT): 3 times/wk  Plan of Care Review  Plan of Care Reviewed With: patient  Outcome Evaluation: Pt is a 73 year old male admitted to [facility] on 1/20 for SOA. Pt reports that he lives with his wife and is ind. with ADLs and uses no AD for mobility. Pt up with nursing upon arrival to room. Today pt SBA for commode tf, SBA for toileting, SBA for mobility around the room, TORRES for LB dressing, and SBA for grooming task standing at sink. Pt may benefit from skilled OT services to improve overall safety before returning home.     Time Calculation:   Evaluation Complexity (OT)  Review Occupational Profile/Medical/Therapy History Complexity: expanded/moderate complexity  Assessment, Occupational Performance/Identification of Deficit Complexity: 3-5 performance deficits  Clinical  Decision Making Complexity (OT): detailed assessment/moderate complexity  Overall Complexity of Evaluation (OT): moderate complexity     Time Calculation- OT       Row Name 01/22/24 1057             Time Calculation- OT    OT Start Time 0946  -KN      OT Stop Time 1008  -KN      OT Time Calculation (min) 22 min  -KN      OT Received On 01/22/24  -KN         Timed Charges    16113 - OT Self Care/Mgmt Minutes 16  -KN         Untimed Charges    OT Eval/Re-eval Minutes 8  -KN         Total Minutes    Timed Charges Total Minutes 16  -KN      Untimed Charges Total Minutes 8  -KN       Total Minutes 24  -KN                User Key  (r) = Recorded By, (t) = Taken By, (c) = Cosigned By      Initials Name Provider Type    KN Wilfrid Valdez OT Occupational Therapist                  Therapy Charges for Today       Code Description Service Date Service Provider Modifiers Qty    99747957522 HC OT SELF CARE/MGMT/TRAIN EA 15 MIN 1/22/2024 Wilfrid Valdez OT GO 1    34431624392 HC OT EVAL MOD COMPLEXITY 2 1/22/2024 Wilfrid Valdez OT GO 1                 Wilfrid Valdez OT  1/22/2024

## 2024-01-22 NOTE — PROGRESS NOTES
Discharge Planning Assessment  [facility]     Patient Name: Jered Morejon  MRN: [id number]  Today's Date: 1/22/2024    Admit Date: 1/20/2024    Plan: Home with family   Discharge Needs Assessment       Row Name 01/22/24 1717       Living Environment    People in Home spouse    Name(s) of People in Home Levi    Current Living Arrangements home    Primary Care Provided by self    Provides Primary Care For other (see comments);parent(s)  patient and spouse provide primary care for pt's father-in-law who has dementia    Family Caregiver if Needed spouse    Quality of Family Relationships helpful;involved    Able to Return to Prior Arrangements yes       Resource/Environmental Concerns    Resource/Environmental Concerns none       Transition Planning    Patient/Family Anticipates Transition to home with family    Patient/Family Anticipated Services at Transition none    Transportation Anticipated family or friend will provide       Discharge Needs Assessment    Readmission Within the Last 30 Days no previous admission in last 30 days    Equipment Currently Used at Home none    Concerns to be Addressed denies needs/concerns at this time;adjustment to diagnosis/illness    Provided Post Acute Provider List? N/A    Provided Post Acute Provider Quality & Resource List? N/A    Patient's Choice of Community Agency(s) denies needs at this time                   Discharge Plan       Row Name 01/22/24 1718       Plan    Plan Home with family    Plan Comments Met with patient at the bedside, verified facesheet. Patient lives with his spouse, patient and spouse care for pt's father-in-law who has dementia. Patient reports he is IADL, he does not use any DME. Patient reports he is able to drive, owns a vehicle and denies transportation needs. Patient's PCP is retired, patient requested provider form; left at bedside. Patient denies HH, SNF and DME needs at this time. Patient is planning to return home with spouse. CCP will follow  progress.                  Continued Care and Services - Admitted Since 1/20/2024    Coordination has not been started for this encounter.       Expected Discharge Date and Time       Expected Discharge Date Expected Discharge Time    Jan 24, 2024            Demographic Summary    No documentation.                  Functional Status       Row Name 01/22/24 1718       Functional Status    Usual Activity Tolerance moderate       Assessment of Health Literacy    Health Literacy Fair       Functional Status, IADL    Medications independent    Meal Preparation independent    Housekeeping independent    Laundry independent    Shopping independent       Mental Status    General Appearance WDL WDL       Mental Status Summary    Recent Changes in Mental Status/Cognitive Functioning unable to assess                   Psychosocial    No documentation.                  Abuse/Neglect    No documentation.                  Legal    No documentation.                  Substance Abuse    No documentation.                  Patient Forms    No documentation.                     Inna Ross RN

## 2024-01-22 NOTE — PLAN OF CARE
Goal Outcome Evaluation:  Plan of Care Reviewed With: patient           Outcome Evaluation: Pt is a 73 year old male admitted to [facility] on 1/20 for SOA. Pt reports that he lives with his wife and is ind. with ADLs and uses no AD for mobility. Pt up with nursing upon arrival to room. Today pt SBA for commode tf, SBA for toileting, SBA for mobility around the room, TORRES for LB dressing, and SBA for grooming task standing at sink. Pt may benefit from skilled OT services to improve overall safety before returning home.      Anticipated Discharge Disposition (OT): home

## 2024-01-23 LAB
ANION GAP SERPL CALCULATED.3IONS-SCNC: 12 MMOL/L (ref 5–15)
BASOPHILS # BLD AUTO: 0.06 10*3/MM3 (ref 0–0.2)
BASOPHILS NFR BLD AUTO: 0.4 % (ref 0–1.5)
BUN SERPL-MCNC: 21 MG/DL (ref 8–23)
BUN/CREAT SERPL: 18.6 (ref 7–25)
CALCIUM SPEC-SCNC: 9.1 MG/DL (ref 8.6–10.5)
CHLORIDE SERPL-SCNC: 97 MMOL/L (ref 98–107)
CO2 SERPL-SCNC: 28 MMOL/L (ref 22–29)
CREAT SERPL-MCNC: 1.13 MG/DL (ref 0.76–1.27)
DEPRECATED RDW RBC AUTO: 38.2 FL (ref 37–54)
EGFRCR SERPLBLD CKD-EPI 2021: 68.6 ML/MIN/1.73
EOSINOPHIL # BLD AUTO: 0.22 10*3/MM3 (ref 0–0.4)
EOSINOPHIL NFR BLD AUTO: 1.3 % (ref 0.3–6.2)
ERYTHROCYTE [DISTWIDTH] IN BLOOD BY AUTOMATED COUNT: 11.9 % (ref 12.3–15.4)
GLUCOSE SERPL-MCNC: 76 MG/DL (ref 65–99)
HCT VFR BLD AUTO: 41.8 % (ref 37.5–51)
HGB BLD-MCNC: 14 G/DL (ref 13–17.7)
IMM GRANULOCYTES # BLD AUTO: 0.14 10*3/MM3 (ref 0–0.05)
IMM GRANULOCYTES NFR BLD AUTO: 0.8 % (ref 0–0.5)
LYMPHOCYTES # BLD AUTO: 3.28 10*3/MM3 (ref 0.7–3.1)
LYMPHOCYTES NFR BLD AUTO: 19.4 % (ref 19.6–45.3)
MCH RBC QN AUTO: 29.6 PG (ref 26.6–33)
MCHC RBC AUTO-ENTMCNC: 33.5 G/DL (ref 31.5–35.7)
MCV RBC AUTO: 88.4 FL (ref 79–97)
MONOCYTES # BLD AUTO: 1.88 10*3/MM3 (ref 0.1–0.9)
MONOCYTES NFR BLD AUTO: 11.1 % (ref 5–12)
NEUTROPHILS NFR BLD AUTO: 11.32 10*3/MM3 (ref 1.7–7)
NEUTROPHILS NFR BLD AUTO: 67 % (ref 42.7–76)
NRBC BLD AUTO-RTO: 0 /100 WBC (ref 0–0.2)
PLATELET # BLD AUTO: 437 10*3/MM3 (ref 140–450)
PMV BLD AUTO: 10.6 FL (ref 6–12)
POTASSIUM SERPL-SCNC: 3.7 MMOL/L (ref 3.5–5.2)
RBC # BLD AUTO: 4.73 10*6/MM3 (ref 4.14–5.8)
SODIUM SERPL-SCNC: 137 MMOL/L (ref 136–145)
URATE SERPL-MCNC: 7.3 MG/DL (ref 3.4–7)
WBC NRBC COR # BLD AUTO: 16.9 10*3/MM3 (ref 3.4–10.8)

## 2024-01-23 PROCEDURE — 80048 BASIC METABOLIC PNL TOTAL CA: CPT | Performed by: INTERNAL MEDICINE

## 2024-01-23 PROCEDURE — 84132 ASSAY OF SERUM POTASSIUM: CPT | Performed by: STUDENT IN AN ORGANIZED HEALTH CARE EDUCATION/TRAINING PROGRAM

## 2024-01-23 PROCEDURE — 83735 ASSAY OF MAGNESIUM: CPT | Performed by: STUDENT IN AN ORGANIZED HEALTH CARE EDUCATION/TRAINING PROGRAM

## 2024-01-23 PROCEDURE — 99232 SBSQ HOSP IP/OBS MODERATE 35: CPT | Performed by: INTERNAL MEDICINE

## 2024-01-23 PROCEDURE — 25010000002 FUROSEMIDE PER 20 MG: Performed by: INTERNAL MEDICINE

## 2024-01-23 PROCEDURE — 93010 ELECTROCARDIOGRAM REPORT: CPT | Performed by: INTERNAL MEDICINE

## 2024-01-23 PROCEDURE — 93005 ELECTROCARDIOGRAM TRACING: CPT | Performed by: STUDENT IN AN ORGANIZED HEALTH CARE EDUCATION/TRAINING PROGRAM

## 2024-01-23 PROCEDURE — 85025 COMPLETE CBC W/AUTO DIFF WBC: CPT | Performed by: INTERNAL MEDICINE

## 2024-01-23 PROCEDURE — 97530 THERAPEUTIC ACTIVITIES: CPT

## 2024-01-23 PROCEDURE — 84550 ASSAY OF BLOOD/URIC ACID: CPT | Performed by: INTERNAL MEDICINE

## 2024-01-23 RX ORDER — FUROSEMIDE 40 MG/1
40 TABLET ORAL
Status: DISCONTINUED | OUTPATIENT
Start: 2024-01-23 | End: 2024-01-24 | Stop reason: HOSPADM

## 2024-01-23 RX ADMIN — FLUTICASONE PROPIONATE 1 SPRAY: 50 SPRAY, METERED NASAL at 08:32

## 2024-01-23 RX ADMIN — Medication 10 ML: at 08:33

## 2024-01-23 RX ADMIN — SACUBITRIL AND VALSARTAN 1 TABLET: 24; 26 TABLET, FILM COATED ORAL at 20:29

## 2024-01-23 RX ADMIN — METHOCARBAMOL TABLETS 750 MG: 750 TABLET, COATED ORAL at 17:44

## 2024-01-23 RX ADMIN — FUROSEMIDE 40 MG: 10 INJECTION, SOLUTION INTRAMUSCULAR; INTRAVENOUS at 01:23

## 2024-01-23 RX ADMIN — METHOCARBAMOL TABLETS 750 MG: 750 TABLET, COATED ORAL at 08:32

## 2024-01-23 RX ADMIN — Medication 1 TABLET: at 08:32

## 2024-01-23 RX ADMIN — BUPROPION HYDROCHLORIDE 300 MG: 300 TABLET, EXTENDED RELEASE ORAL at 08:32

## 2024-01-23 RX ADMIN — FUROSEMIDE 40 MG: 40 TABLET ORAL at 17:44

## 2024-01-23 RX ADMIN — CARVEDILOL 6.25 MG: 6.25 TABLET, FILM COATED ORAL at 17:44

## 2024-01-23 RX ADMIN — RIVAROXABAN 20 MG: 20 TABLET, FILM COATED ORAL at 08:32

## 2024-01-23 RX ADMIN — LORAZEPAM 1 MG: 1 TABLET ORAL at 22:02

## 2024-01-23 RX ADMIN — Medication 100 MG: at 08:32

## 2024-01-23 RX ADMIN — Medication 10 ML: at 20:28

## 2024-01-23 RX ADMIN — CARVEDILOL 6.25 MG: 6.25 TABLET, FILM COATED ORAL at 08:32

## 2024-01-23 RX ADMIN — METHOCARBAMOL TABLETS 750 MG: 750 TABLET, COATED ORAL at 20:29

## 2024-01-23 NOTE — PROGRESS NOTES
Name: Jered Morejon ADMIT: 2024   : 1950  PCP: Provider, No Known    MRN: [id number] LOS: 3 days   AGE/SEX: 73 y.o. male  ROOM: [facility]     Subjective   Subjective     No new complaints. Diuresing well.        Objective   Objective   Vital Signs  Temp:  [97.3 °F (36.3 °C)-98.2 °F (36.8 °C)] 97.9 °F (36.6 °C)  Heart Rate:  [93-98] 93  Resp:  [18] 18  BP: (103-109)/(69-84) 108/84  SpO2:  [97 %-98 %] 97 %  on   ;   Device (Oxygen Therapy): room air    Intake/Output Summary (Last 24 hours) at 2024 1348  Last data filed at 2024 0544  Gross per 24 hour   Intake 360 ml   Output 2150 ml   Net -1790 ml     Body mass index is 26.4 kg/m².      24  0517 24  0500 24  0510   Weight: 80.1 kg (176 lb 8 oz) 79.1 kg (174 lb 4.8 oz) 78.7 kg (173 lb 9.6 oz)     Physical Exam  Constitutional:       General: He is not in acute distress.  HENT:      Head: Normocephalic and atraumatic.   Cardiovascular:      Rate and Rhythm: Normal rate. Rhythm irregular.   Pulmonary:      Effort: Pulmonary effort is normal. No respiratory distress.      Comments: Diminished bilaterally   Abdominal:      General: There is no distension.      Palpations: Abdomen is soft.      Tenderness: There is no abdominal tenderness.   Skin:     General: Skin is warm and dry.   Neurological:      General: No focal deficit present.      Mental Status: He is alert and oriented to person, place, and time.       .    Results Review:      Results from last 7 days   Lab Units 24  0336 24  0408 24  1124 24  1934   SODIUM mmol/L 137 138 139 137   POTASSIUM mmol/L 3.7 3.9 4.1 4.8   CHLORIDE mmol/L 97* 100 98 101   CO2 mmol/L 28.0 26.8 26.4 24.1   BUN mg/dL 21 20 17 15   CREATININE mg/dL 1.13 1.07 1.01 0.96   GLUCOSE mg/dL 76 116* 236* 158*   CALCIUM mg/dL 9.1 8.7 9.1 9.2   AST (SGOT) U/L  --   --   --  18   ALT (SGPT) U/L  --   --   --  27     Estimated Creatinine Clearance: 64.8 mL/min (by C-G formula based on  "SCr of 1.13 mg/dL).  Results from last 7 days   Lab Units 01/21/24  1340   HEMOGLOBIN A1C % 6.10*         Results from last 7 days   Lab Units 01/20/24  2137 01/20/24  1934   HSTROP T ng/L 38* 35*     Results from last 7 days   Lab Units 01/21/24  1124   PROBNP pg/mL 3,524.0*                   Invalid input(s): \"LDLCALC\"  Results from last 7 days   Lab Units 01/23/24  0336 01/22/24  1048 01/22/24  0408 01/21/24  0312 01/20/24  1934   WBC 10*3/mm3 16.90* 23.78* 28.34* 13.87* 18.55*   HEMOGLOBIN g/dL 14.0 12.3* 13.0 14.5 14.1   HEMATOCRIT % 41.8 38.2 39.1 43.0 43.0   PLATELETS 10*3/mm3 437 453* 427 405 457*   MCV fL 88.4 87.8 88.7 88.5 91.1   MCH pg 29.6 28.3 29.5 29.8 29.9   MCHC g/dL 33.5 32.2 33.2 33.7 32.8   RDW % 11.9* 11.6* 11.9* 11.7* 12.1*   RDW-SD fl 38.2 36.6* 38.5 37.5 39.5   MPV fL 10.6 10.5 10.7 10.9 10.5   NEUTROPHIL % % 67.0  --  83.9* 94.0* 82.2*   LYMPHOCYTE % % 19.4*  --  5.7* 3.9* 8.5*   MONOCYTES % % 11.1  --  9.1 1.4* 7.9   EOSINOPHIL % % 1.3  --  0.0* 0.1* 0.5   BASOPHIL % % 0.4  --  0.1 0.1 0.3   IMM GRAN % % 0.8*  --  1.2* 0.5 0.6*   NEUTROS ABS 10*3/mm3 11.32*  --  23.75* 13.04* 15.23*   LYMPHS ABS 10*3/mm3 3.28*  --  1.62 0.54* 1.58   MONOS ABS 10*3/mm3 1.88*  --  2.58* 0.20 1.47*   EOS ABS 10*3/mm3 0.22  --  0.01 0.01 0.09   BASOS ABS 10*3/mm3 0.06  --  0.04 0.01 0.06   IMMATURE GRANS (ABS) 10*3/mm3 0.14*  --  0.34* 0.07* 0.12*   NRBC /100 WBC 0.0  --  0.0 0.0 0.1             Results from last 7 days   Lab Units 01/20/24  1934   PROCALCITONIN ng/mL 0.07   LACTATE mmol/L 1.4             Results from last 7 days   Lab Units 01/20/24 2012 01/20/24  1934   BLOODCX  No growth at 2 days No growth at 2 days     Results from last 7 days   Lab Units 01/20/24 1948   ADENOVIRUS DETECTION BY PCR  Not Detected   CORONAVIRUS 229E  Not Detected   CORONAVIRUS HKU1  Not Detected   CORONAVIRUS NL63  Not Detected   CORONAVIRUS OC43  Not Detected   HUMAN METAPNEUMOVIRUS  Not Detected   HUMAN " RHINOVIRUS/ENTEROVIRUS  Not Detected   INFLUENZA B PCR  Not Detected   PARAINFLUENZA 1  Not Detected   PARAINFLUENZA VIRUS 2  Not Detected   PARAINFLUENZA VIRUS 3  Not Detected   PARAINFLUENZA VIRUS 4  Not Detected   BORDETELLA PERTUSSIS PCR  Not Detected   CHLAMYDOPHILA PNEUMONIAE PCR  Not Detected   MYCOPLAMA PNEUMO PCR  Not Detected   INFLUENZA A PCR  Not Detected   RSV, PCR  Not Detected         Results from last 7 days   Lab Units 01/23/24  0336   URIC ACID mg/dL 7.3*       Imaging:  Imaging Results (Last 24 Hours)       Procedure Component Value Units Date/Time    CT Chest Without Contrast Diagnostic [[id number]] Collected: 01/22/24 1604     Updated: 01/22/24 1616    Narrative:      CT CHEST WITHOUT CONTRAST     HISTORY: Respiratory illness.     TECHNIQUE:  CT chest includes axial imaging from the thoracic inlet to  the upper abdomen without IV contrast. Date reconstructed in coronal and  sagittal planes. Radiation dose reduction techniques were utilized,  including automated exposure control and exposure modulation based on  body size.     COMPARISON: AP chest 01/20/2024     FINDINGS: There are abnormal bilateral interstitial and ground-glass  opacities consistent with multilobar infiltrates. Infiltrates are most  extensive within the right upper lobe though also present in the eft  upper lobe.     Within the anterior right lower lobe on image 77 there is a 1 cm  pulmonary nodular opacity. Within the left lower lobe on image 72 there  is a 4 mm pleural-based nodule. Moderate pleural effusions layer  dependently.     There is mediastinal greg enlargement. A precarinal node measures 1.1  cm. Heart size is mildly enlarged and there are mild coronary arterial  calcifications. Imaging through the upper abdomen appears within normal  limits.       Impression:      1. Upper lobe interstitial and ground-glass infiltrates best  demonstrated within the right upper lobe.  2. 1 cm pulmonary nodular opacity anterior right  lower lobe and 0.5 cm  pleural-based nodule opacity left lower lobe. Recommend follow-up with  chest CT in 2 to 3 months.  3. Moderate pleural effusions layer dependently.  4. Mild mediastinal greg enlargement most likely due to reactive nodes.  5. Mild cardiomegaly with coronary arterial calcification.     Radiation dose reduction techniques were utilized, including automated  exposure control and exposure modulation based on body size.        This report was finalized on 1/22/2024 4:13 PM by Dr. Rusty Higginbotham M.D on Workstation: [id number]                  I reviewed the patient's new clinical results / labs / tests / procedures      Assessment/Plan     Active Hospital Problems    Diagnosis  POA    Cardiomyopathy, dilated [I42.0]  Unknown    Pulmonary emphysema [J43.9]  Yes    Essential hypertension [I10]  Yes    Leukocytosis [D72.829]  Yes    Alcohol use [Z78.9]  Yes    Longstanding persistent atrial fibrillation [I48.11]  Yes    Acute systolic (congestive) heart failure [I50.21]  Yes    Atrial fibrillation with RVR [I48.91]  Yes      Resolved Hospital Problems   No resolved problems to display.       Acute systolic congestive heart failure exacerbation (new onset)/rapid A-fib in a patient with a history of hypertension/chronic diastolic congestive heart failure/A-fib.  Echo findings noted.  Dr. CORONA thinks that his EF is closer to 40 to 45% with some [id number] variability due to A-fib.  Recommend holding off on ischemic evaluation and optimizing guideline directed medical therapy as blood pressure allows.  Currently on IV Lasix.    Abnormal CT scan- some infiltrates noted.  He also has an elevated WBC count.  However he is afebrile and on room air.  Will ask pulm for consultation    History of COPD/recent pneumonia status posttreatment.  Procalcitonin and lactic acid are normal.  There is no fever.  Respiratory PCR is negative.  White count remains elevated.  Chest x-ray showed worsening bilateral  opacities.  CT findings noted     Alcohol abuse.  No withdrawals clinically. On CIWA protocol and detoxification.    Leukocytosis/thrombocytosis.  Leukocytosis improving.  Thrombocytosis resolved.  Will monitor.    Hyperglycemia.  Will check A1c.    VTE prophylaxis.  Patient anticoagulated.    Eusebio Mejia MD  [facility]  01/23/24  13:48 EST

## 2024-01-23 NOTE — THERAPY TREATMENT NOTE
Patient Name: Jered Morejon  : 1950    MRN: [id number]                              Today's Date: 2024       Admit Date: 2024    Visit Dx:     ICD-10-CM ICD-9-CM   1. Atrial fibrillation with RVR  I48.91 427.31   2. Acute congestive heart failure, unspecified heart failure type  I50.9 428.0   3. Hypoxia  R09.02 799.02     Patient Active Problem List   Diagnosis    Atrial fibrillation with RVR    Pulmonary emphysema    Essential hypertension    Leukocytosis    Alcohol use    Longstanding persistent atrial fibrillation    Acute systolic (congestive) heart failure    Cardiomyopathy, dilated     Past Medical History:   Diagnosis Date    Atrial fibrillation     Hypertension     Renal infarct      Past Surgical History:   Procedure Laterality Date    CARDIAC CATHETERIZATION      CARDIOVERSION        General Information       Row Name 24 1356          Physical Therapy Time and Intention    Document Type therapy note (daily note)  -CB     Mode of Treatment individual therapy;physical therapy  -CB       Row Name 24 1356          General Information    Existing Precautions/Restrictions fall  -CB       Row Name 24 1356          Cognition    Orientation Status (Cognition) oriented x 3  -CB               User Key  (r) = Recorded By, (t) = Taken By, (c) = Cosigned By      Initials Name Provider Type    CB Lucila Alford, PT Physical Therapist                   Mobility       Row Name 24 1358          Bed Mobility    Bed Mobility supine-sit;sit-supine  -CB     Supine-Sit [place] (Bed Mobility) modified independence  -CB     Sit-Supine [place] (Bed Mobility) modified independence  -CB     Assistive Device (Bed Mobility) head of bed elevated  -CB       Row Name 24 1350          Sit-Stand Transfer    Sit-Stand [place] (Transfers) standby assist  -CB     Assistive Device (Sit-Stand Transfers) --  no AD  -CB       Row Name 24 1357          Gait/Stairs (Locomotion)     [place] Level (Gait) standby assist;verbal cues  -CB     Assistive Device (Gait) --  no AD  -CB     Distance in Feet (Gait) 350ft  -CB     Deviations/Abnormal Patterns (Gait) gait speed decreased;stride length decreased;lupis decreased  -CB     Comment, (Gait/Stairs) no overt LOB and increased steadiness as distance incresaed; pt reports gait is better than at home during ambulation  -CB               User Key  (r) = Recorded By, (t) = Taken By, (c) = Cosigned By      Initials Name Provider Type    Lucila Stearns, PT Physical Therapist                   Obj/Interventions       Row Name 01/23/24 1400          Balance    Balance Assessment standing static balance;standing dynamic balance  -CB     Static Standing Balance standby assist  -CB     Dynamic Standing Balance standby assist  -CB     Position/Device Used, Standing Balance unsupported  -CB               User Key  (r) = Recorded By, (t) = Taken By, (c) = Cosigned By      Initials Name Provider Type    Lucila Stearns, PT Physical Therapist                   Goals/Plan    No documentation.                  Clinical Impression       Row Name 01/23/24 1401          Pain    Pretreatment Pain Rating 0/10 - no pain  -CB       Row Name 01/23/24 1401          Plan of Care Review    Plan of Care Reviewed With patient  -CB     Outcome Evaluation Patient is agreeable to PT this afternoon and improved overall functional mobility. He completed STS with SBA and then increased ambulation distance to 350ft without AD requiring SBA. No overt LOB noted and increased steadiness as distance incresaed. Pt ambulated on RA and O2 decresaed to 80%. Upon sitting and PLB technique O2 increased within about 30-45sec to 88%. Encouraged continued ambulation with [facility] staff.  -CB       Row Name 01/23/24 1401          Vital Signs    O2 Delivery Pre Treatment room air  -CB     Intra SpO2 (%) 80  -CB     O2 Delivery Intra Treatment room air  -CB     O2 Delivery Post Treatment room  air  -CB       Row Name 01/23/24 1401          Positioning and Restraints    Pre-Treatment Position in bed  -CB     Post Treatment Position bed  -CB     In Bed notified nsg;supine;fowlers;call light within reach;encouraged to call for assist;exit alarm on;side rails up x2  -CB               User Key  (r) = Recorded By, (t) = Taken By, (c) = Cosigned By      Initials Name Provider Type    CB Lucila Alford, SHABANA Physical Therapist                   Outcome Measures       Row Name 01/23/24 1403 01/23/24 0836       How much help from another person do you currently need...    Turning from your back to your side while in flat bed without using bedrails? 4  -CB 4  -LM    Moving from lying on back to sitting on the side of a flat bed without bedrails? 4  -CB 3  -LM    Moving to and from a bed to a chair (including a wheelchair)? 3  -CB 3  -LM    Standing up from a chair using your arms (e.g., wheelchair, bedside chair)? 3  -CB 3  -LM    Climbing 3-5 steps with a railing? 3  -CB 3  -LM    To walk in hospital room? 3  -CB 3  -LM    AM-PAC 6 Clicks Score (PT) 20  -CB 19  -LM    Highest Level of Mobility Goal 6 --> Walk 10 steps or more  -CB 6 --> Walk 10 steps or more  -LM      Row Name 01/23/24 1403          Functional Assessment    Outcome Measure Options AM-PAC 6 Clicks Basic Mobility (PT)  -CB               User Key  (r) = Recorded By, (t) = Taken By, (c) = Cosigned By      Initials Name Provider Type    Cheryl Faye, RN Registered Nurse    Lucila Stearns, PT Physical Therapist                                 Physical Therapy Education       Title: PT OT SLP Therapies (In Progress)       Topic: Physical Therapy (In Progress)       Point: Mobility training (Done)       Learning Progress Summary             Patient Acceptance, E,TB, VU,NR by CB at 1/23/2024 1403    Acceptance, E,TB, VU,NR by WILLARD at 1/22/2024 1142                         Point: Home exercise program (Not Started)       Learner Progress:  Not  documented in this visit.              Point: Body mechanics (Not Started)       Learner Progress:  Not documented in this visit.              Point: Precautions (Not Started)       Learner Progress:  Not documented in this visit.                              User Key       Initials Effective Dates Name Provider Type Discipline     10/22/21 -  Lucila Alford PT Physical Therapist PT                  PT Recommendation and Plan  Planned Therapy Interventions (PT): balance training, bed mobility training, gait training, home exercise program, patient/family education, transfer training, strengthening  Plan of Care Reviewed With: patient  Outcome Evaluation: Patient is agreeable to PT this afternoon and improved overall functional mobility. He completed STS with SBA and then increased ambulation distance to 350ft without AD requiring SBA. No overt LOB noted and increased steadiness as distance incresaed. Pt ambulated on RA and O2 decresaed to 80%. Upon sitting and PLB technique O2 increased within about 30-45sec to 88%. Encouraged continued ambulation with [facility] staff.     Time Calculation:         PT Charges       Row Name 01/23/24 1404             Time Calculation    Start Time 1322  -CB      Stop Time 1331  -CB      Time Calculation (min) 9 min  -CB      PT Received On 01/23/24  -CB      PT - Next Appointment 01/24/24  -CB         Time Calculation- PT    Total Timed Code Minutes- PT 9 minute(s)  -CB         Timed Charges    23658 - PT Therapeutic Activity Minutes 9  -CB         Total Minutes    Timed Charges Total Minutes 9  -CB       Total Minutes 9  -CB                User Key  (r) = Recorded By, (t) = Taken By, (c) = Cosigned By      Initials Name Provider Type    CB Lucila Alford PT Physical Therapist                  Therapy Charges for Today       Code Description Service Date Service Provider Modifiers Qty    72140038026  PT THERAPEUTIC ACT EA 15 MIN 1/22/2024 Lucila Alford PT GP 1    51513338490 HC PT  EVAL MOD COMPLEXITY 2 1/22/2024 Lucila Alford, PT GP 1    01551352731 HC PT THERAPEUTIC ACT EA 15 MIN 1/23/2024 Lucila Alford, PT GP 1            PT G-Codes  Outcome Measure Options: AM-PAC 6 Clicks Basic Mobility (PT)  AM-PAC 6 Clicks Score (PT): 20  AM-PAC 6 Clicks Score (OT): 21  PT Discharge Summary  Anticipated Discharge Disposition (PT): home with assist    Lucila Alford PT  1/23/2024

## 2024-01-23 NOTE — PLAN OF CARE
Goal Outcome Evaluation:  Plan of Care Reviewed With: patient        Progress: improving  Outcome Evaluation: VSS. Consulted pulm for lung infiltrates. PT on RA. Pt in afib. IV lasix changed back to PO. Will continue to monitor.

## 2024-01-23 NOTE — PLAN OF CARE
Goal Outcome Evaluation:  Plan of Care Reviewed With: patient           Outcome Evaluation: Patient is agreeable to PT this afternoon and improved overall functional mobility. He completed STS with SBA and then increased ambulation distance to 350ft without AD requiring SBA. No overt LOB noted and increased steadiness as distance incresaed. Pt ambulated on RA and O2 decresaed to 80%. Upon sitting and PLB technique O2 increased within about 30-45sec to 88%. Encouraged continued ambulation with [facility] staff.      Anticipated Discharge Disposition (PT): home with assist

## 2024-01-23 NOTE — CONSULTS
CONSULT NOTE    Patient Identification:  Jered Morejon  73 y.o.  male  1950  [id number]            Requesting physician: Hospitalist    Reason for Consultation: Bilateral pulmonary infiltrate/dyspnea    CC:     History of Present Illness:  73-year-old gentleman history of cardiomyopathy recovered EF A-fib remote smoking history probable COPD admitted to the hospitalist service with worsening exertional dyspnea orthopnea over the last few weeks.  Patient had reported minimal productive cough.  No fever chills or aspiration was reported.  Apparently he was seen in the emergency room prior with concerns for pneumonia and treated with outpatient antibiotics.  Patient's current echo EF closer to 40 to 45%.  Patient has been placed on diuretics and he tells me he has clinically improved.  History of alcohol abuse but no aspiration reported.  In his medical record COPD has been documented but patient unaware of this diagnosis.      Review of Systems  As above rest is negative  Past Medical History:  Past Medical History:   Diagnosis Date    Atrial fibrillation     Hypertension     Renal infarct        Past Surgical History:  Past Surgical History:   Procedure Laterality Date    CARDIAC CATHETERIZATION      CARDIOVERSION          Home Meds:  Medications Prior to Admission   Medication Sig Dispense Refill Last Dose    buPROPion XL (WELLBUTRIN XL) 300 MG 24 hr tablet Take 1 tablet by mouth Daily.   1/20/2024    digoxin (LANOXIN) 250 MCG tablet TAKE 1 TABLET DAILY 90 tablet 3 1/20/2024    dilTIAZem XR (DILACOR XR) 120 MG 24 hr capsule Take 1 capsule by mouth Daily. 90 capsule 3 1/20/2024    fluticasone (FLONASE) 50 MCG/ACT nasal spray 1 spray into the nostril(s) as directed by provider Daily.   1/19/2024    lisinopril (PRINIVIL,ZESTRIL) 2.5 MG tablet TAKE 1 TABLET DAILY 90 tablet 3 1/20/2024    methocarbamol (ROBAXIN) 750 MG tablet Take 1 tablet by mouth 3 (Three) Times a Day.   Past Week    Xarelto 20 MG tablet TAKE 1  "TABLET DAILY 90 tablet 3 2024    nebivolol (BYSTOLIC) 5 MG tablet Take 1 tablet by mouth Daily. (Patient not taking: Reported on 2024)   Not Taking       Allergies:  No Known Allergies    Social History:   Social History     Socioeconomic History    Marital status: Single   Tobacco Use    Smoking status: Former     Packs/day: 2.00     Years: 45.00     Additional pack years: 0.00     Total pack years: 90.00     Types: Cigarettes     Quit date: 2012     Years since quittin.0    Smokeless tobacco: Never    Tobacco comments:     caffeine use   Vaping Use    Vaping Use: Never used   Substance and Sexual Activity    Alcohol use: Yes     Comment: 3 drinks a day    Drug use: No    Sexual activity: Defer       Family History:  Family History   Problem Relation Age of Onset    No Known Problems Mother     No Known Problems Father     No Known Problems Sister     No Known Problems Brother     No Known Problems Maternal Aunt     No Known Problems Maternal Uncle     No Known Problems Paternal Aunt     No Known Problems Paternal Uncle     No Known Problems Maternal Grandmother     No Known Problems Maternal Grandfather     No Known Problems Paternal Grandmother     No Known Problems Paternal Grandfather        Physical Exam:  /84 (BP Location: Right arm, Patient Position: Lying)   Pulse 93   Temp 97.9 °F (36.6 °C) (Oral)   Resp 18   Ht 172.7 cm (67.99\")   Wt 78.7 kg (173 lb 9.6 oz)   SpO2 97%   BMI 26.40 kg/m²  Body mass index is 26.4 kg/m². 97% 78.7 kg (173 lb 9.6 oz)  Physical Exam  Patient is examined using the personal protective equipment as per guidelines from infection control for this particular patient as enacted.  Hand hygiene was performed before and after patient interaction.  Well-developed normal body habitus  Eyes normal conjunctivae and pupils reactive to light  ENT Mallampati between 3 and 4 normal nasal exam  Neck midline trachea no thyromegaly  Chest diminished breath sounds " with crackles bilaterally on the upper lobes  CVS regular rate and rhythm no lower extremity edema  Abdomen soft nontender no hepatosplenomegaly  CNS intact normal sensory exam  Skin no rashes no nodules  Psych oriented to time place and person normal memory  Musculoskeletal no cyanosis no clubbing normal range of motion        LABS:  Lab Results   Component Value Date    CALCIUM 9.1 01/23/2024     Results from last 7 days   Lab Units 01/23/24  0336 01/22/24  1048 01/22/24  0408 01/21/24  1124 01/21/24  0312 01/20/24 1934   SODIUM mmol/L 137  --  138 139  --  137   POTASSIUM mmol/L 3.7  --  3.9 4.1  --  4.8   CHLORIDE mmol/L 97*  --  100 98  --  101   CO2 mmol/L 28.0  --  26.8 26.4  --  24.1   BUN mg/dL 21 -- 20 17  --  15   CREATININE mg/dL 1.13  --  1.07 1.01  --  0.96   GLUCOSE mg/dL 76  --  116* 236*  --  158*   CALCIUM mg/dL 9.1  --  8.7 9.1  --  9.2   WBC 10*3/mm3 16.90* 23.78* 28.34*  --    < > 18.55*   HEMOGLOBIN g/dL 14.0 12.3* 13.0  --    < > 14.1   PLATELETS 10*3/mm3 437 453* 427  --    < > 457*   ALT (SGPT) U/L  --   --   --   --   --  27   AST (SGOT) U/L  --   --   --   --   --  18   PROBNP pg/mL  --   --   --  3,524.0*  --   --    PROCALCITONIN ng/mL  --   --   --   --   --  0.07    < > = values in this interval not displayed.     Lab Results   Component Value Date    TROPONINT 38 (H) 01/20/2024     Results from last 7 days   Lab Units 01/20/24 2137 01/20/24 1934   HSTROP T ng/L 38* 35*     Results from last 7 days   Lab Units 01/20/24 2012 01/20/24 1934   BLOODCX  No growth at 2 days No growth at 2 days     Results from last 7 days   Lab Units 01/20/24 1934   PROCALCITONIN ng/mL 0.07   LACTATE mmol/L 1.4         Results from last 7 days   Lab Units 01/20/24 1948   ADENOVIRUS DETECTION BY PCR  Not Detected   CORONAVIRUS 229E  Not Detected   CORONAVIRUS HKU1  Not Detected   CORONAVIRUS NL63  Not Detected   CORONAVIRUS OC43  Not Detected   HUMAN METAPNEUMOVIRUS  Not Detected   HUMAN  "RHINOVIRUS/ENTEROVIRUS  Not Detected   INFLUENZA B PCR  Not Detected   PARAINFLUENZA 1  Not Detected   PARAINFLUENZA VIRUS 2  Not Detected   PARAINFLUENZA VIRUS 3  Not Detected   PARAINFLUENZA VIRUS 4  Not Detected   BORDETELLA PERTUSSIS PCR  Not Detected   BORDETELLA PARAPERTUSSIS PCR  Not Detected   CHLAMYDOPHILA PNEUMONIAE PCR  Not Detected   MYCOPLAMA PNEUMO PCR  Not Detected   RSV, PCR  Not Detected         Results from last 7 days   Lab Units 01/20/24 2012 01/20/24  1934   BLOODCX  No growth at 2 days No growth at 2 days     No results found for: \"TSH\"  Estimated Creatinine Clearance: 64.8 mL/min (by C-G formula based on SCr of 1.13 mg/dL).         Imaging: I personally visualized the images of scans/x-rays performed within last 3 days.      Assessment:  Dyspnea  Acute on chronic systolic and diastolic heart failure  Bilateral pulmonary infiltrate  Pulmonary nodule  Remote smoking history  Alcohol abuse  Recent pneumonia  Chronic A-fib  Possible COPD    Recommendations:  At this time we have a gentleman with multiple ongoing medical issue issues as listed above.  I suspect his current pulmonary status and pulmonary infiltrate likely related to CHF.  Clinically he seems to have improved with diuresis.  I would recommend follow-up CT chest in 8 to 10 weeks to ensure complete resolution of this infiltrate.  He did have leukocytosis on admission but this seems to be coming down and seems to be reactive and improving.  Patient current CT also shows pulmonary nodule which needs to have a follow-up CT chest as outlined above  Will also need outpatient PFTs with follow-up  Discussed plan of care in detail with the patient.  Will continue to monitor clinical course closely.            Linda Wayne MD  1/23/2024  14:51 EST      Much of this encounter note is an electronic transcription/translation of spoken language to prinWithout antibiotics suspect this is more reactive.  juan text using Dragon Software.  "

## 2024-01-23 NOTE — PLAN OF CARE
Goal Outcome Evaluation:  Plan of Care Reviewed With: patient        Progress: improving  Outcome Evaluation: VSS. Room air. CIWAs as high as 3 overnight. No c/o pain or soa. PRN ativan and melatonin given with hs meds. PRN nasal spray also administered. IV lasix continued. Pt voiding via urinal. No BM this shift. AM labs show WBCs still elevated, but improved to now 16.90.

## 2024-01-23 NOTE — PROGRESS NOTES
"   LOS: 3 days   Patient Care Team:  Provider, No Known as PCP - General    Chief Complaint: SOA     Interval History: Off O2, says he's still SOA with ambulation but it's better. Reports cough and chest congestion. No pain. Denies palps/LH. Unsteady on his feet.       Objective   Vital Signs  Temp:  [97.3 °F (36.3 °C)-98.2 °F (36.8 °C)] 98.2 °F (36.8 °C)  Heart Rate:  [93-98] 93  Resp:  [18] 18  BP: ()/(66-78) 109/78    Intake/Output Summary (Last 24 hours) at 1/23/2024 1242  Last data filed at 1/23/2024 0544  Gross per 24 hour   Intake 760 ml   Output 2150 ml   Net -1390 ml     Last Weight and Admission Weight        01/23/24  0510   Weight: 78.7 kg (173 lb 9.6 oz)     Flowsheet Rows      Flowsheet Row First Filed Value   Admission Height 172.7 cm (67.99\") Documented at 01/21/2024 0700   Admission Weight 81.8 kg (180 lb 6.4 oz) Documented at 01/20/2024 2227          Physical Exam  Constitutional:       General: He is not in acute distress.  HENT:      Head: Normocephalic.   Eyes:      Conjunctiva/sclera: Conjunctivae normal.   Cardiovascular:      Rate and Rhythm: Normal rate. Rhythm irregular.   Pulmonary:      Effort: Pulmonary effort is normal.      Breath sounds: Rales (scattered, soft) present.   Abdominal:      Palpations: Abdomen is soft.   Musculoskeletal:         General: No swelling.   Neurological:      General: No focal deficit present.         Results Review:      Results from last 7 days   Lab Units 01/23/24  0336 01/22/24  0408 01/21/24  1124   SODIUM mmol/L 137 138 139   POTASSIUM mmol/L 3.7 3.9 4.1   CHLORIDE mmol/L 97* 100 98   CO2 mmol/L 28.0 26.8 26.4   BUN mg/dL 21 20 17   CREATININE mg/dL 1.13 1.07 1.01   GLUCOSE mg/dL 76 116* 236*   CALCIUM mg/dL 9.1 8.7 9.1     Results from last 7 days   Lab Units 01/20/24  2137 01/20/24  1934   HSTROP T ng/L 38* 35*     Results from last 7 days   Lab Units 01/23/24  0336 01/22/24  1048 01/22/24  0408   WBC 10*3/mm3 16.90* 23.78* 28.34*   HEMOGLOBIN " g/dL 14.0 12.3* 13.0   HEMATOCRIT % 41.8 38.2 39.1   PLATELETS 10*3/mm3 437 453* 427                     I reviewed the patient's new clinical results.  I personally viewed and interpreted the patient's EKG/Telemetry data        Medication Review:   buPROPion XL, 300 mg, Oral, Daily  carvedilol, 6.25 mg, Oral, BID With Meals  fluticasone, 1 spray, Nasal, Daily  furosemide, 40 mg, Intravenous, Q12H  methocarbamol, 750 mg, Oral, TID  multivitamin, 1 tablet, Oral, Daily  rivaroxaban, 20 mg, Oral, Daily  sacubitril-valsartan, 1 tablet, Oral, Q12H  sodium chloride, 10 mL, Intravenous, Q12H  thiamine, 100 mg, Oral, Daily      Assessment & Plan     1. Acute on chronic HFrEF  2. History of NICM with previous recovery  3. Alcohol overuse  4. HTN  5. Recent PNA  6. Leukocytosis  7. Chronic atrial fibrillation  8. COPD    Chest CT shows diffuse infiltrates, unclear if edema or infection or post-infectious. Regardless, he appears to be clinically improving. Does he need pulm eval, or follow up CT in a month? Will defer to primary team.     I reviewed his echo, and his EF varies from beat to beat due to atrial fibrillation. He was recently treated for PNA, and his WBC is up and his CXR shows patchy infiltrates. I think his EF is actually ~40-45%. I can't review the images from 2016.     Continue carvedilol (he previously reported adverse side effects with metoprolol succinate), avoid CCB due to CMP. I changed lisinopril to sacubitril-valsartan, which will start tonight. We'll have to watch his BP as it's a bit low. Continue rivaroxaban. Digoxin was held upon arrival due to elevated serum level of 1.6. HR is ~ bpm, will start 125mcg daily (he was on 250mcg at home). Switch to PO furosemide.     Optimize GDMT as BP allows; no immediate plans for ischemic evaluation. I highly recommended he stop EtOH (he drinks at least 3 beers per day). Will d/w Dr Washington, patient's usual cardiologist.     Anmol Rivera,  MD  01/23/24  12:42 EST

## 2024-01-24 ENCOUNTER — READMISSION MANAGEMENT (OUTPATIENT)
Dept: CALL CENTER | Facility: HOSPITAL | Age: 74
End: 2024-01-24
Payer: MEDICARE

## 2024-01-24 ENCOUNTER — HOME HEALTH ADMISSION (OUTPATIENT)
Dept: HOME HEALTH SERVICES | Facility: HOME HEALTHCARE | Age: 74
End: 2024-01-24
Payer: MEDICARE

## 2024-01-24 VITALS
OXYGEN SATURATION: 92 % | RESPIRATION RATE: 18 BRPM | TEMPERATURE: 97.6 F | DIASTOLIC BLOOD PRESSURE: 78 MMHG | HEART RATE: 95 BPM | WEIGHT: 171 LBS | HEIGHT: 68 IN | SYSTOLIC BLOOD PRESSURE: 114 MMHG | BODY MASS INDEX: 25.91 KG/M2

## 2024-01-24 LAB
ANION GAP SERPL CALCULATED.3IONS-SCNC: 12 MMOL/L (ref 5–15)
BASOPHILS # BLD AUTO: 0.04 10*3/MM3 (ref 0–0.2)
BASOPHILS NFR BLD AUTO: 0.3 % (ref 0–1.5)
BUN SERPL-MCNC: 20 MG/DL (ref 8–23)
BUN/CREAT SERPL: 20.6 (ref 7–25)
CALCIUM SPEC-SCNC: 8.6 MG/DL (ref 8.6–10.5)
CHLORIDE SERPL-SCNC: 98 MMOL/L (ref 98–107)
CO2 SERPL-SCNC: 29 MMOL/L (ref 22–29)
CREAT SERPL-MCNC: 0.97 MG/DL (ref 0.76–1.27)
DEPRECATED RDW RBC AUTO: 37.3 FL (ref 37–54)
EGFRCR SERPLBLD CKD-EPI 2021: 82.4 ML/MIN/1.73
EOSINOPHIL # BLD AUTO: 0.25 10*3/MM3 (ref 0–0.4)
EOSINOPHIL NFR BLD AUTO: 1.7 % (ref 0.3–6.2)
ERYTHROCYTE [DISTWIDTH] IN BLOOD BY AUTOMATED COUNT: 11.8 % (ref 12.3–15.4)
GLUCOSE SERPL-MCNC: 103 MG/DL (ref 65–99)
HCT VFR BLD AUTO: 41.2 % (ref 37.5–51)
HGB BLD-MCNC: 13.7 G/DL (ref 13–17.7)
IMM GRANULOCYTES # BLD AUTO: 0.1 10*3/MM3 (ref 0–0.05)
IMM GRANULOCYTES NFR BLD AUTO: 0.7 % (ref 0–0.5)
LYMPHOCYTES # BLD AUTO: 3.22 10*3/MM3 (ref 0.7–3.1)
LYMPHOCYTES NFR BLD AUTO: 22.3 % (ref 19.6–45.3)
MAGNESIUM SERPL-MCNC: 2.1 MG/DL (ref 1.6–2.4)
MCH RBC QN AUTO: 29.4 PG (ref 26.6–33)
MCHC RBC AUTO-ENTMCNC: 33.3 G/DL (ref 31.5–35.7)
MCV RBC AUTO: 88.4 FL (ref 79–97)
MONOCYTES # BLD AUTO: 1.61 10*3/MM3 (ref 0.1–0.9)
MONOCYTES NFR BLD AUTO: 11.2 % (ref 5–12)
NEUTROPHILS NFR BLD AUTO: 63.8 % (ref 42.7–76)
NEUTROPHILS NFR BLD AUTO: 9.2 10*3/MM3 (ref 1.7–7)
NRBC BLD AUTO-RTO: 0 /100 WBC (ref 0–0.2)
PLATELET # BLD AUTO: 410 10*3/MM3 (ref 140–450)
PMV BLD AUTO: 10 FL (ref 6–12)
POTASSIUM SERPL-SCNC: 3.6 MMOL/L (ref 3.5–5.2)
POTASSIUM SERPL-SCNC: 3.7 MMOL/L (ref 3.5–5.2)
QT INTERVAL: 400 MS
QTC INTERVAL: 487 MS
RBC # BLD AUTO: 4.66 10*6/MM3 (ref 4.14–5.8)
SODIUM SERPL-SCNC: 139 MMOL/L (ref 136–145)
URATE SERPL-MCNC: 7.2 MG/DL (ref 3.4–7)
WBC NRBC COR # BLD AUTO: 14.42 10*3/MM3 (ref 3.4–10.8)

## 2024-01-24 PROCEDURE — 99232 SBSQ HOSP IP/OBS MODERATE 35: CPT | Performed by: NURSE PRACTITIONER

## 2024-01-24 PROCEDURE — 94618 PULMONARY STRESS TESTING: CPT

## 2024-01-24 PROCEDURE — 85025 COMPLETE CBC W/AUTO DIFF WBC: CPT | Performed by: INTERNAL MEDICINE

## 2024-01-24 PROCEDURE — 84550 ASSAY OF BLOOD/URIC ACID: CPT | Performed by: INTERNAL MEDICINE

## 2024-01-24 PROCEDURE — 80048 BASIC METABOLIC PNL TOTAL CA: CPT | Performed by: INTERNAL MEDICINE

## 2024-01-24 RX ORDER — LANOLIN ALCOHOL/MO/W.PET/CERES
100 CREAM (GRAM) TOPICAL DAILY
Qty: 30 TABLET | Refills: 0 | Status: SHIPPED | OUTPATIENT
Start: 2024-01-25

## 2024-01-24 RX ORDER — CARVEDILOL 6.25 MG/1
6.25 TABLET ORAL 2 TIMES DAILY WITH MEALS
Qty: 120 TABLET | Refills: 0 | Status: SHIPPED | OUTPATIENT
Start: 2024-01-24

## 2024-01-24 RX ORDER — POTASSIUM CHLORIDE 750 MG/1
40 TABLET, FILM COATED, EXTENDED RELEASE ORAL EVERY 4 HOURS
Status: COMPLETED | OUTPATIENT
Start: 2024-01-24 | End: 2024-01-24

## 2024-01-24 RX ORDER — DIGOXIN 125 MCG
125 TABLET ORAL
Status: DISCONTINUED | OUTPATIENT
Start: 2024-01-24 | End: 2024-01-24 | Stop reason: HOSPADM

## 2024-01-24 RX ORDER — FUROSEMIDE 40 MG/1
40 TABLET ORAL 2 TIMES DAILY
Qty: 120 TABLET | Refills: 0 | Status: SHIPPED | OUTPATIENT
Start: 2024-01-24

## 2024-01-24 RX ORDER — DIGOXIN 125 MCG
125 TABLET ORAL
Qty: 90 TABLET | Refills: 0 | Status: SHIPPED | OUTPATIENT
Start: 2024-01-24

## 2024-01-24 RX ORDER — POTASSIUM CHLORIDE 20 MEQ/1
40 TABLET, EXTENDED RELEASE ORAL EVERY 4 HOURS
Qty: 2 TABLET | Refills: 0 | Status: SHIPPED | OUTPATIENT
Start: 2024-01-24 | End: 2024-01-25

## 2024-01-24 RX ADMIN — Medication 1 TABLET: at 08:37

## 2024-01-24 RX ADMIN — POTASSIUM CHLORIDE 40 MEQ: 750 TABLET, EXTENDED RELEASE ORAL at 08:37

## 2024-01-24 RX ADMIN — POTASSIUM CHLORIDE 40 MEQ: 750 TABLET, EXTENDED RELEASE ORAL at 12:21

## 2024-01-24 RX ADMIN — FUROSEMIDE 40 MG: 40 TABLET ORAL at 08:37

## 2024-01-24 RX ADMIN — METHOCARBAMOL TABLETS 750 MG: 750 TABLET, COATED ORAL at 08:37

## 2024-01-24 RX ADMIN — FLUTICASONE PROPIONATE 1 SPRAY: 50 SPRAY, METERED NASAL at 08:37

## 2024-01-24 RX ADMIN — Medication 10 ML: at 08:38

## 2024-01-24 RX ADMIN — BUPROPION HYDROCHLORIDE 300 MG: 300 TABLET, EXTENDED RELEASE ORAL at 08:37

## 2024-01-24 RX ADMIN — Medication 100 MG: at 08:37

## 2024-01-24 RX ADMIN — SACUBITRIL AND VALSARTAN 1 TABLET: 24; 26 TABLET, FILM COATED ORAL at 08:37

## 2024-01-24 RX ADMIN — DIGOXIN 125 MCG: 125 TABLET ORAL at 11:09

## 2024-01-24 RX ADMIN — RIVAROXABAN 20 MG: 20 TABLET, FILM COATED ORAL at 08:37

## 2024-01-24 RX ADMIN — CARVEDILOL 6.25 MG: 6.25 TABLET, FILM COATED ORAL at 08:37

## 2024-01-24 NOTE — NURSING NOTE
Reviewed d/c instructions (AVS) with patient and spouse, questions answered.  Walker delivered to pt/wife.

## 2024-01-24 NOTE — DISCHARGE PLACEMENT REQUEST
"Katrina Morejon (73 y.o. Male)       Date of Birth   1950    Social Security Number       Address   [address] [place] [address]    Home Phone   [phone]    MRN   [id number]       Protestant   Anab[employer]ism    Marital Status   Single                            Admission Date   1/20/24    Admission Type   Emergency    Admitting Provider   Steven Pinzon MD    Attending Provider   Eusebio Mejia MD    Department, Room/Bed   [address], [address]       Discharge Date       Discharge Disposition   Home or Self Care    Discharge Destination                                 Attending Provider: Eusebio Mejia MD    Allergies: No Known Allergies    Isolation: None   Infection: None   Code Status: CPR    Ht: 172.7 cm (67.99\")   Wt: 77.6 kg (171 lb)    Admission Cmt: None   Principal Problem: None                  Active Insurance as of 1/20/2024       Primary Coverage       Payor Plan Insurance Group Employer/Plan Group    ANTHEM MEDICARE REPLACEMENT ANTHEM MEDICARE ADVANTAGE [id number]       Payor Plan Address Payor Plan Phone Number Payor Plan Fax Number Effective Dates    [facility] [id number] [phone]  1/1/2024 - None Entered    [facility] [address]         Subscriber Name Subscriber Birth Date Member ID       KATRINA MOREJON 1950 [id number]                     Emergency Contacts        (Rel.) Home Phone Work Phone Mobile Phone    Levi Somers (Spouse) [phone] -- [phone]                "

## 2024-01-24 NOTE — CASE MANAGEMENT/SOCIAL WORK
Case Management Discharge Note      Final Note: Home with wife and [facility] for nursing and PT. Walker provided through [employer]. Did not require home O2. Family transport.    Provided Post Acute Provider List?: N/A  Provided Post Acute Provider Quality & Resource List?: N/A    Selected Continued Care - Discharged on 1/24/2024 Admission date: 1/20/2024 - Discharge disposition: Home or Self Care      Destination    No services have been selected for the patient.                Durable Medical Equipment Coordination complete.      Service Provider Selected Services Address Phone Fax Patient Preferred    [facility] [place] [facility] [address] [address] [phone] --              Dialysis/Infusion    No services have been selected for the patient.                Home Medical Care Coordination complete.      Service Provider Selected Services Address Phone Fax Patient Preferred    [facility] ,  [facility] [address] [address] [phone] --              Therapy    No services have been selected for the patient.                Community Resources    No services have been selected for the patient.                Community & DME    No services have been selected for the patient.                    Transportation Services  Private: Car    Final Discharge Disposition Code: 06 - home with home health care

## 2024-01-24 NOTE — DISCHARGE SUMMARY
Patient Name: Jered Morejon  : 1950  MRN: [id number]    Date of Admission: 2024  Date of Discharge:  2024  Primary Care Physician: Yuliana Raymundo APRN      Chief Complaint:   Shortness of Breath      Discharge Diagnoses     Active Hospital Problems    Diagnosis  POA    Cardiomyopathy, dilated [I42.0]  Unknown    Pulmonary emphysema [J43.9]  Yes    Essential hypertension [I10]  Yes    Leukocytosis [D72.829]  Yes    Alcohol use [Z78.9]  Yes    Longstanding persistent atrial fibrillation [I48.11]  Yes    Acute systolic (congestive) heart failure [I50.21]  Yes    Atrial fibrillation with RVR [I48.91]  Yes      Resolved Hospital Problems   No resolved problems to display.        Hospital Course       This is a 70-year-old man with a history of atrial fibrillation, hypertension, systolic heart failure who was admitted for A-fib with RVR as well as acute systolic heart failure..  Echocardiogram showed an EF of 32%.  However there was thought to be some PTB variation and this is a true fracture was actually thought to be closer to 40 to 45%..  He was started on IV Lasix and then transition to oral.  Guideline directed medical therapy was optimized as well.  Coreg was initiated.  Lisinopril was transitioned to Entresto.  Digoxin was also resumed at a lower dose.  He was transitioned to oral Lasix and discharged to follow-up with his usual cardiologist, Dr. Washington.     Physical Exam:  Temp:  [97.6 °F (36.4 °C)-98.4 °F (36.9 °C)] 97.6 °F (36.4 °C)  Heart Rate:  [] 95  Resp:  [18] 18  BP: (112-125)/(74-95) 114/78  Body mass index is 26.01 kg/m².  Physical Exam  Constitutional:       General: He is not in acute distress.  HENT:      Head: Normocephalic and atraumatic.   Cardiovascular:      Rate and Rhythm: Normal rate. Rhythm irregular.   Pulmonary:      Effort: Pulmonary effort is normal.      Comments: Diminished bilaterally   Abdominal:      General: There is no distension.      Palpations:  Abdomen is soft.      Tenderness: There is no abdominal tenderness.   Neurological:      General: No focal deficit present.      Mental Status: He is alert and oriented to person, place, and time.      Motor: Weakness present.         Consultants     Consult Orders (all) (From admission, onward)       Start     Ordered    01/23/24 1349  Inpatient Pulmonology Consult  Once        Specialty:  Pulmonary Disease  Provider:  Antonio Pineda MD    01/23/24 1348    01/21/24 1150  Inpatient Access Center Consult  Once        Provider:  (Not yet assigned)    01/21/24 1149    01/21/24 1141  Inpatient Cardiology Consult  Once        Specialty:  Cardiology  Provider:  Vinicio Mckinley MD    01/21/24 1140    01/21/24 0700  Inpatient Case Management  Consult  Once        Provider:  (Not yet assigned)    01/21/24 0031    01/20/24 2106  [facility] (on-call MD unless specified) Details  Once        Specialty:  Hospitalist  Provider:  (Not yet assigned)    01/20/24 2105                  Procedures     Imaging Results (All)       Procedure Component Value Units Date/Time    CT Chest Without Contrast Diagnostic [[id number]] Collected: 01/22/24 1604     Updated: 01/22/24 1616    Narrative:      CT CHEST WITHOUT CONTRAST     HISTORY: Respiratory illness.     TECHNIQUE:  CT chest includes axial imaging from the thoracic inlet to  the upper abdomen without IV contrast. Date reconstructed in coronal and  sagittal planes. Radiation dose reduction techniques were utilized,  including automated exposure control and exposure modulation based on  body size.     COMPARISON: AP chest 01/20/2024     FINDINGS: There are abnormal bilateral interstitial and ground-glass  opacities consistent with multilobar infiltrates. Infiltrates are most  extensive within the right upper lobe though also present in the eft  upper lobe.     Within the anterior right lower lobe on image 77 there is a 1 cm  pulmonary nodular opacity. Within the left lower  lobe on image 72 there  is a 4 mm pleural-based nodule. Moderate pleural effusions layer  dependently.     There is mediastinal greg enlargement. A precarinal node measures 1.1  cm. Heart size is mildly enlarged and there are mild coronary arterial  calcifications. Imaging through the upper abdomen appears within normal  limits.       Impression:      1. Upper lobe interstitial and ground-glass infiltrates best  demonstrated within the right upper lobe.  2. 1 cm pulmonary nodular opacity anterior right lower lobe and 0.5 cm  pleural-based nodule opacity left lower lobe. Recommend follow-up with  chest CT in 2 to 3 months.  3. Moderate pleural effusions layer dependently.  4. Mild mediastinal greg enlargement most likely due to reactive nodes.  5. Mild cardiomegaly with coronary arterial calcification.     Radiation dose reduction techniques were utilized, including automated  exposure control and exposure modulation based on body size.        This report was finalized on 1/22/2024 4:13 PM by Dr. Rusty Higginbotham M.D on Workstation: [id number]       XR Chest 1 View [[id number]] Collected: 01/20/24 2034     Updated: 01/20/24 2038    Narrative:      SINGLE VIEW OF THE CHEST     HISTORY: Shortness of breath     COMPARISON: January 12, 2024     FINDINGS:  The heart is enlarged. There are bilateral pulmonary opacities. These  have worsened when compared to prior study. No pneumothorax is seen. No  large effusion is identified. There is calcification of the aorta.       Impression:      Worsening bilateral pulmonary opacities.     This report was finalized on 1/20/2024 8:35 PM by Dr. Courtney Gold M.D on Workstation: [id number]               Pertinent Labs     Results from last 7 days   Lab Units 01/24/24  0350 01/23/24  0336 01/22/24  1048 01/22/24  0408   WBC 10*3/mm3 14.42* 16.90* 23.78* 28.34*   HEMOGLOBIN g/dL 13.7 14.0 12.3* 13.0   PLATELETS 10*3/mm3 410 437 453* 427     Results from last 7 days   Lab Units  "01/24/24  0350 01/23/24 2322 01/23/24  0336 01/22/24  0408 01/21/24  1124   SODIUM mmol/L 139  --  137 138 139   POTASSIUM mmol/L 3.6 3.7 3.7 3.9 4.1   CHLORIDE mmol/L 98  --  97* 100 98   CO2 mmol/L 29.0  --  28.0 26.8 26.4   BUN mg/dL 20  --  21 20 17   CREATININE mg/dL 0.97  --  1.13 1.07 1.01   GLUCOSE mg/dL 103*  --  76 116* 236*   Estimated Creatinine Clearance: 74.4 mL/min (by C-G formula based on SCr of 0.97 mg/dL).  Results from last 7 days   Lab Units 01/20/24 1934   ALBUMIN g/dL 3.4*   BILIRUBIN mg/dL 0.6   ALK PHOS U/L 73   AST (SGOT) U/L 18   ALT (SGPT) U/L 27     Results from last 7 days   Lab Units 01/24/24  0350 01/23/24 2322 01/23/24 0336 01/22/24 0408 01/21/24  1124 01/20/24  1934   CALCIUM mg/dL 8.6  --  9.1 8.7 9.1 9.2   ALBUMIN g/dL  --   --   --   --   --  3.4*   MAGNESIUM mg/dL  --  2.1  --   --   --   --        Results from last 7 days   Lab Units 01/21/24  1339 01/21/24 1124 01/20/24 2137 01/20/24 1934   HSTROP T ng/L  --   --  38* 35*   PROBNP pg/mL  --  3,524.0*  --   --    DIGOXIN LVL ng/mL 1.60*  --   --   --      Results from last 7 days   Lab Units 01/24/24 0350   URIC ACID mg/dL 7.2*         Invalid input(s): \"LDLCALC\"  Results from last 7 days   Lab Units 01/20/24 2012 01/20/24  1934   BLOODCX  No growth at 3 days No growth at 3 days       Test Results Pending at Discharge     Pending Labs       Order Current Status    Blood Culture - Blood, Arm, Left Preliminary result    Blood Culture - Blood, Blood, Venous Preliminary result            Discharge Details        Discharge Medications        New Medications        Instructions Start Date   carvedilol 6.25 MG tablet  Commonly known as: COREG   6.25 mg, Oral, 2 Times Daily With Meals      Entresto 24-26 MG tablet  Generic drug: sacubitril-valsartan   1 tablet, Oral, Every 12 Hours Scheduled      furosemide 40 MG tablet  Commonly known as: LASIX   40 mg, Oral, 2 Times Daily      potassium chloride 20 MEQ CR tablet  Commonly " known as: K-DUR,KLOR-CON   40 mEq, Oral, Every 4 Hours      thiamine 100 MG tablet  Commonly known as: VITAMIN B1   100 mg, Oral, Daily   Start Date: January 25, 2024            Changes to Medications        Instructions Start Date   digoxin 125 MCG tablet  Commonly known as: LANOXIN  What changed:   medication strength  how much to take  when to take this   125 mcg, Oral, Daily Digoxin             Continue These Medications        Instructions Start Date   buPROPion  MG 24 hr tablet  Commonly known as: WELLBUTRIN XL   300 mg, Oral, Daily      fluticasone 50 MCG/ACT nasal spray  Commonly known as: FLONASE   1 spray, Nasal, Daily      methocarbamol 750 MG tablet  Commonly known as: ROBAXIN   750 mg, Oral, 3 Times Daily      Xarelto 20 MG tablet  Generic drug: rivaroxaban   TAKE 1 TABLET DAILY             Stop These Medications      dilTIAZem  MG 24 hr capsule  Commonly known as: DILACOR XR     lisinopril 2.5 MG tablet  Commonly known as: PRINIVIL,ZESTRIL     nebivolol 5 MG tablet  Commonly known as: BYSTOLIC              No Known Allergies      Discharge Disposition:  Home or Self Care    Discharge Diet:  Diet Order   Procedures    Diet: Cardiac Diets; Healthy Heart (2-3 Na+); Texture: Regular Texture (IDDSI 7); Fluid Consistency: Thin (IDDSI 0)       Discharge Activity:       CODE STATUS:    Code Status and Medical Interventions:   Ordered at: 01/20/24 2304     Code Status (Patient has no pulse and is not breathing):    CPR (Attempt to Resuscitate)     Medical Interventions (Patient has pulse or is breathing):    Full Support       Future Appointments   Date Time Provider Department Center   2/23/2024  2:15 PM Soren Washington Jr., MD [facility] [place]      Contact information for follow-up providers       Linda Wayne MD Follow up in 8 week(s).    Specialties: Pulmonary Disease, Sleep Medicine, Intensive Care  Contact information:  [facility] [id number] [phone]                Provider, No Known .    Contact information:  [facility] [address]  [phone]                       Contact information for after-discharge care       Durable Medical Equipment       [facility] [place] .    Service: Durable Medical Equipment  Contact information:  [address]  [facility] [id number] [phone]                                   Time Spent on Discharge:  Greater than 30 minutes      Eusebio Mejia MD  [facility]  01/24/24  14:28 EST

## 2024-01-24 NOTE — PROGRESS NOTES
Exercise Oximetry    Patient Name:Jered Morejon   MRN: [id number]   Date: 01/24/24             ROOM AIR BASELINE   SpO2% 94   Heart Rate 88   Blood Pressure      EXERCISE ON ROOM AIR SpO2% EXERCISE ON O2 @ 0 LPM SpO2%   1 MINUTE 94 1 MINUTE    2 MINUTES 95 2 MINUTES    3 MINUTES 93 3 MINUTES    4 MINUTES 92 4 MINUTES    5 MINUTES 90 5 MINUTES    6 MINUTES 92 6 MINUTES               Distance Walked   Distance Walked   Dyspnea (Jennifer Scale)   Dyspnea (Jennifer Scale)   Fatigue (Jennifer Scale)   Fatigue (Jennifer Scale)   SpO2% Post Exercise  93 SpO2% Post Exercise   HR Post Exercise  105 HR Post Exercise   Time to Recovery   Time to Recovery     Comments: Patient tolerated walking on Room Air well with lowest SpO2 recorded being 90%. Patient did require holding onto this RT for stability throughout the entire walk.     Patient returned to room without incident and remains on Room Air at this time.

## 2024-01-24 NOTE — PROGRESS NOTES
"      [place] [facility]         Dr Mckeon Sayied   LOS: 4 days   Patient Care Team:  Provider, No Known as PCP - General    Chief Complaint: Dyspnea with heart failure bilateral pulmonary infiltrate and nodule other issues as listed below    Interval History: Remains on 2 L oxygen nasal cannula.  Resting comfortably.  No overnight issues reported.    REVIEW OF SYSTEMS:   CARDIOVASCULAR: No chest pain, chest pressure or chest discomfort. No palpitations or edema.   RESPIRATORY: Short of breath with activity   GASTROINTESTINAL: No anorexia, nausea, vomiting or diarrhea. No abdominal pain or blood.   HEMATOLOGIC: No bleeding or bruising.     Ventilator/Non-Invasive Ventilation Settings (From admission, onward)      None              Vital Signs  Temp:  [97.7 °F (36.5 °C)-98.4 °F (36.9 °C)] 97.7 °F (36.5 °C)  Heart Rate:  [] 95  Resp:  [18] 18  BP: (108-125)/(74-95) 120/74    Intake/Output Summary (Last 24 hours) at 1/24/2024 1116  Last data filed at 1/24/2024 0300  Gross per 24 hour   Intake 400 ml   Output 1400 ml   Net -1000 ml     Flowsheet Rows      Flowsheet Row First Filed Value   Admission Height 172.7 cm (67.99\") Documented at 01/21/2024 0700   Admission Weight 81.8 kg (180 lb 6.4 oz) Documented at 01/20/2024 2227            Physical Exam:  Patient is examined using the personal protective equipment as per guidelines from infection control for this particular patient as enacted.  Hand hygiene was performed before and after patient interaction.   General Appearance:    Alert, cooperative, in no acute distress.  Following simple commands  ENT oral cavity moist mucous membrane  Neck midline trachea, no thyromegaly   Lungs:     diminished breath sounds with rhonchi on the upper lobes bilaterally.    Heart:    Regular rhythm and normal rate, normal S1 and S2, no            murmur, no gallop, no rub, no click   Chest Wall:    No abnormalities observed   Abdomen:     Normal bowel sounds, no masses, no " organomegaly, soft        nontender, nondistended, no guarding, no rebound                tenderness   Extremities:   , no edema, no cyanosis, no             redness  Skin no rashes no nodules  Musculoskeletal no cyanosis no clubbing      Results Review:        Results from last 7 days   Lab Units 01/24/24  0350 01/23/24  2322 01/23/24  0336 01/22/24  0408   SODIUM mmol/L 139  --  137 138   POTASSIUM mmol/L 3.6 3.7 3.7 3.9   CHLORIDE mmol/L 98  --  97* 100   CO2 mmol/L 29.0  --  28.0 26.8   BUN mg/dL 20  --  21 20   CREATININE mg/dL 0.97  --  1.13 1.07   GLUCOSE mg/dL 103*  --  76 116*   CALCIUM mg/dL 8.6  --  9.1 8.7     Results from last 7 days   Lab Units 01/20/24 2137 01/20/24  1934   HSTROP T ng/L 38* 35*     Results from last 7 days   Lab Units 01/24/24  0350 01/23/24  0336 01/22/24  1048   WBC 10*3/mm3 14.42* 16.90* 23.78*   HEMOGLOBIN g/dL 13.7 14.0 12.3*   HEMATOCRIT % 41.2 41.8 38.2   PLATELETS 10*3/mm3 410 437 453*             Results from last 7 days   Lab Units 01/23/24  2322   MAGNESIUM mg/dL 2.1               I reviewed the patient's new clinical results.  I personally viewed and interpreted the patient's chest x-ray.        Medication Review:   buPROPion XL, 300 mg, Oral, Daily  carvedilol, 6.25 mg, Oral, BID With Meals  digoxin, 125 mcg, Oral, Daily  fluticasone, 1 spray, Nasal, Daily  furosemide, 40 mg, Oral, BID  methocarbamol, 750 mg, Oral, TID  multivitamin, 1 tablet, Oral, Daily  potassium chloride ER, 40 mEq, Oral, Q4H  rivaroxaban, 20 mg, Oral, Daily  sacubitril-valsartan, 1 tablet, Oral, Q12H  sodium chloride, 10 mL, Intravenous, Q12H  thiamine, 100 mg, Oral, Daily             ASSESSMENT:   Dyspnea  Acute on chronic systolic and diastolic heart failure  Bilateral pulmonary infiltrate  Pulmonary nodule  Remote smoking history  Alcohol abuse  Recent pneumonia  Chronic A-fib  Possible COPD    PLAN:  Clinically stable and improving  I suspect current pulmonary infiltrate like related to CHF  since no clear signs of infection.  Recommend follow-up CT chest in 8 to 10 weeks to ensure complete resolution of the infiltrate.  Also pulmonary nodule needs follow-up with repeat CT chest in 8 to 10 weeks  Outpatient PFTs.  I will arrange follow-up with me in 8 to 10 weeks in the office  Mobilize ambulate as much as able to  Discussed plan of care in detail with the patient  No objections to discharge from pulmonary point of view      Linda Wayne MD  01/24/24  11:16 EST

## 2024-01-24 NOTE — NURSING NOTE
Notified the cardiology about patient had 10 beats runs of V-tach as well as the result of stat EKG, k of 3.7 and Mg of 2.1 and the patient are asymptomatic , and made them aware of it, no new order made. Sirena PALENCIA called me back.

## 2024-01-24 NOTE — PROGRESS NOTES
"    Patient Name: Jered Morejon  :1950  73 y.o.      Patient Care Team:  Provider, No Known as PCP - General    Chief Complaint: follow up heart failure    Interval History: adequate urine output on oral diuretics. Renal function stable. WBC count trending down. H/H stable. Had NSVT 10 beats last night. Asymptomatic. EKG was obtained and unchanged from prior. Lytes stable.    He still has some orthopnea. Lungs sound clear.        Objective   Vital Signs  Temp:  [97.7 °F (36.5 °C)-98.4 °F (36.9 °C)] 97.7 °F (36.5 °C)  Heart Rate:  [] 95  Resp:  [18] 18  BP: (108-125)/(74-95) 120/74    Intake/Output Summary (Last 24 hours) at 2024 0919  Last data filed at 2024 0300  Gross per 24 hour   Intake 400 ml   Output 1400 ml   Net -1000 ml     Flowsheet Rows      Flowsheet Row First Filed Value   Admission Height 172.7 cm (67.99\") Documented at 2024 0700   Admission Weight 81.8 kg (180 lb 6.4 oz) Documented at 2024 2227            Physical Exam:   General Appearance:    Alert, cooperative, in no acute distress   Lungs:     Clear to auscultation.  Normal respiratory effort and rate.      Heart:    irregular rhythm and normal rate, normal S1 and S2, no murmurs, gallops or rubs.     Chest Wall:    No abnormalities observed   Abdomen:     Soft, nontender, positive bowel sounds.     Extremities:   no cyanosis, clubbing or edema.  No marked joint deformities.  Adequate musculoskeletal strength.       Results Review:    Results from last 7 days   Lab Units 24  0350   SODIUM mmol/L 139   POTASSIUM mmol/L 3.6   CHLORIDE mmol/L 98   CO2 mmol/L 29.0   BUN mg/dL 20   CREATININE mg/dL 0.97   GLUCOSE mg/dL 103*   CALCIUM mg/dL 8.6     Results from last 7 days   Lab Units 24  2137 244   HSTROP T ng/L 38* 35*     Results from last 7 days   Lab Units 24  0350   WBC 10*3/mm3 14.42*   HEMOGLOBIN g/dL 13.7   HEMATOCRIT % 41.2   PLATELETS 10*3/mm3 410         Results from last 7 days "   Lab Units 01/23/24  2322   MAGNESIUM mg/dL 2.1                   Medication Review:   buPROPion XL, 300 mg, Oral, Daily  carvedilol, 6.25 mg, Oral, BID With Meals  fluticasone, 1 spray, Nasal, Daily  furosemide, 40 mg, Oral, BID  methocarbamol, 750 mg, Oral, TID  multivitamin, 1 tablet, Oral, Daily  potassium chloride ER, 40 mEq, Oral, Q4H  rivaroxaban, 20 mg, Oral, Daily  sacubitril-valsartan, 1 tablet, Oral, Q12H  sodium chloride, 10 mL, Intravenous, Q12H  thiamine, 100 mg, Oral, Daily              Assessment & Plan   Acute on chronic HFrEF due to NICM. Previously recovered LVEF but most recently EF 40-45%. GDMT  with carvedilol (intolerant to metoprolol nancy the past). CCB stopped due to CM. Lisinopril changed to Entresto (BP seems to be tolerating). No immediate plans for ischemic evaluation. Will reassess as outpatient. Seems to be doing well on oral lasix. Still has some orthopnea but volume is stable on physical exam.  Alcohol overuse  HTN - very stable. No changes recommended today.   Recent PNA, diffuse infiltrates on CT. Pulmonary saw and recommends follow up imaging in 8-10 weeks.   Chronic atrial fibrillation - modest HR control. Oral digoxin has been restarted at 125 mcg daily (was on 250 mcg at home). Anticoagulated with rivaroxaban.   RBBB  COPD  Leukocytosis     TALHA Jackson  [facility]  01/24/24  09:19 EST

## 2024-01-24 NOTE — PLAN OF CARE
Goal Outcome Evaluation:   Had 10 beats run of V-tach , notified the on call cardiologist about it and made them aware , the patient is asymptomatic , stat K 3.7 and Mg of 2.1 , Sirena PALENCIA called me back , no new orders made. Will continue to monitor.

## 2024-01-25 LAB
BACTERIA SPEC AEROBE CULT: NORMAL
BACTERIA SPEC AEROBE CULT: NORMAL

## 2024-01-25 NOTE — OUTREACH NOTE
Prep Survey      Flowsheet Row Responses   [facility] facility patient discharged from? [facility]   Is LACE score < 7 ? No   Eligibility Readm Mgmt   Discharge diagnosis Atrial fibrillation with RVR   Does the patient have one of the following disease processes/diagnoses(primary or secondary)? Other   Does the patient have Home health ordered? No   Is there a DME ordered? No   Prep survey completed? Yes            SHANIA BARRAGAN - Registered Nurse

## 2024-01-29 ENCOUNTER — READMISSION MANAGEMENT (OUTPATIENT)
Dept: CALL CENTER | Facility: HOSPITAL | Age: 74
End: 2024-01-29
Payer: MEDICARE

## 2024-01-29 NOTE — OUTREACH NOTE
Medical Week 1 Survey      Flowsheet Row Responses   [facility] patient discharged from? [facility]   Does the patient have one of the following disease processes/diagnoses(primary or secondary)? Other   Week 1 attempt successful? No   Unsuccessful attempts Attempt 1            Kay Tello Registered Nurse

## 2024-01-31 ENCOUNTER — TELEPHONE (OUTPATIENT)
Dept: CARDIOLOGY | Facility: CLINIC | Age: 74
End: 2024-01-31
Payer: MEDICARE

## 2024-01-31 NOTE — TELEPHONE ENCOUNTER
Patient reached out today and left message to request a response on 2 questions he has. 1) can he have sex? 2) can he use viagra? If not viagra can he still have sex?     [phone]

## 2024-01-31 NOTE — TELEPHONE ENCOUNTER
Please relay to patient.      Yes he  may have sex and patient may also try Viagra.  If Viagra causes chest pain or tightness or pressure then he should not use it any further.

## 2024-02-01 ENCOUNTER — READMISSION MANAGEMENT (OUTPATIENT)
Dept: CALL CENTER | Facility: HOSPITAL | Age: 74
End: 2024-02-01
Payer: MEDICARE

## 2024-02-01 NOTE — OUTREACH NOTE
Medical Week 1 Survey      Flowsheet Row Responses   [facility] patient discharged from? [place]   Does the patient have one of the following disease processes/diagnoses(primary or secondary)? Other   Week 1 attempt successful? Yes   Call start time 1620   Call end time 1621   Discharge diagnosis Atrial fibrillation with RVR   Meds reviewed with patient/caregiver? Yes   Is the patient having any side effects they believe may be caused by any medication additions or changes? No   Does the patient have all medications ordered at discharge? Yes   Is the patient taking all medications as directed (includes completed medication regime)? Yes   Does the patient have a primary care provider?  Yes   Does the patient have an appointment with their PCP within 7 days of discharge? Yes   Has the patient kept scheduled appointments due by today? Yes   Psychosocial issues? No   Did the patient receive a copy of their discharge instructions? Yes   Nursing interventions Reviewed instructions with patient   What is the patient's perception of their health status since discharge? Improving   Is the patient/caregiver able to teach back signs and symptoms related to disease process for when to call PCP? Yes   Is the patient/caregiver able to teach back signs and symptoms related to disease process for when to call 911? Yes   Is the patient/caregiver able to teach back the hierarchy of who to call/visit for symptoms/problems? PCP, Specialist, Home health nurse, Urgent Care, ED, 911 Yes   If the patient is a current smoker, are they able to teach back resources for cessation? Not a smoker   Week 1 call completed? Yes   Would this patient benefit from a Referral to Amb Social Work? No   Is the patient interested in additional calls from an ambulatory ? No   Call end time 1621            SHANIA BARRAGAN - Registered Nurse

## 2024-02-02 ENCOUNTER — TELEPHONE (OUTPATIENT)
Dept: CARDIOLOGY | Facility: CLINIC | Age: 74
End: 2024-02-02
Payer: MEDICARE

## 2024-02-02 NOTE — TELEPHONE ENCOUNTER
Jazzy from [facility] is calling requesting to speak with a member of the Triage staff. She has some concerns about patients medications.     Jazzy can be reached at [phone]    Thanks  ANGELINA Jack   02/02/2024

## 2024-02-05 NOTE — TELEPHONE ENCOUNTER
Spoke to Jazzy. She had 2 questions about patients medications.    She wanted to know what statin he was suppose to be on. She said the patient has both simvastatin and atorvastatin. I told her that from the med list I have I do not see that a statin has ever been prescribed. She said she also spoke to patient's PCP and they said they have not prescribed any statins either. Is this patient suppose to be on one?  Patient is taking 40mg of Lasix BID. He was ordered potassium for 2 days, but has completed this. She said he is super dry. His BP on Friday was 90s/50s. Patient just complains of being very thirsty. Jazzy is wondering if we need to cut back on his diuretic and if he also needs to be on a potassium supplement?    Afsaneh Church RN  Triage [facility] [place]

## 2024-02-05 NOTE — TELEPHONE ENCOUNTER
Called and left  for Jazzy. Will continue to try to reach her. HUB transfer call to triage.     Afsaneh Church RN  Triage [facility] [place] [place]

## 2024-02-05 NOTE — TELEPHONE ENCOUNTER
Jazzy with [employer] called the office to receive update.  I gave her recommendations from provider.  She verbalized understanding.    Thank you,    Deedee MCKENNA RN  Triage [facility] [place]  02/05/24 15:46 EST

## 2024-02-06 NOTE — TELEPHONE ENCOUNTER
Inna with PT called regarding Mr. Morejon.  She states pt's Lasix was discontinued but they need clarification if it's okay to discontinue the Potassium as well.  Please advise, thanks/jlf

## 2024-02-09 ENCOUNTER — READMISSION MANAGEMENT (OUTPATIENT)
Dept: CALL CENTER | Facility: HOSPITAL | Age: 74
End: 2024-02-09
Payer: MEDICARE

## 2024-02-09 NOTE — OUTREACH NOTE
Medical Week 2 Survey      Flowsheet Row Responses   [facility] patient discharged from? [place]   Does the patient have one of the following disease processes/diagnoses(primary or secondary)? Other   Week 2 attempt successful? No   Unsuccessful attempts Attempt 1            Kyara DONOVAN - Registered Nurse

## 2024-02-13 ENCOUNTER — READMISSION MANAGEMENT (OUTPATIENT)
Dept: CALL CENTER | Facility: HOSPITAL | Age: 74
End: 2024-02-13
Payer: MEDICARE

## 2024-02-22 RX ORDER — CARVEDILOL 6.25 MG/1
6.25 TABLET ORAL 2 TIMES DAILY WITH MEALS
Qty: 180 TABLET | Refills: 1 | Status: SHIPPED | OUTPATIENT
Start: 2024-02-22

## 2024-02-22 RX ORDER — DIGOXIN 125 MCG
125 TABLET ORAL
Qty: 90 TABLET | Refills: 1 | Status: SHIPPED | OUTPATIENT
Start: 2024-02-22

## 2024-02-23 ENCOUNTER — OFFICE VISIT (OUTPATIENT)
Dept: CARDIOLOGY | Facility: CLINIC | Age: 74
End: 2024-02-23
Payer: MEDICARE

## 2024-02-23 VITALS
OXYGEN SATURATION: 97 % | BODY MASS INDEX: 26.68 KG/M2 | HEART RATE: 86 BPM | WEIGHT: 170 LBS | SYSTOLIC BLOOD PRESSURE: 124 MMHG | HEIGHT: 67 IN | DIASTOLIC BLOOD PRESSURE: 84 MMHG | RESPIRATION RATE: 16 BRPM

## 2024-02-23 DIAGNOSIS — I50.43 ACUTE ON CHRONIC COMBINED SYSTOLIC AND DIASTOLIC CHF (CONGESTIVE HEART FAILURE): Primary | ICD-10-CM

## 2024-02-23 DIAGNOSIS — I48.21 PERMANENT ATRIAL FIBRILLATION: ICD-10-CM

## 2024-02-23 DIAGNOSIS — I45.2 RBBB (RIGHT BUNDLE BRANCH BLOCK WITH LEFT ANTERIOR FASCICULAR BLOCK): ICD-10-CM

## 2024-02-23 DIAGNOSIS — I10 PRIMARY HYPERTENSION: ICD-10-CM

## 2024-02-23 RX ORDER — TRAZODONE HYDROCHLORIDE 50 MG/1
50 TABLET ORAL NIGHTLY PRN
COMMUNITY
Start: 2024-02-01

## 2024-02-23 RX ORDER — POTASSIUM CHLORIDE 20 MEQ/1
20 TABLET, EXTENDED RELEASE ORAL DAILY
COMMUNITY
Start: 2024-01-31 | End: 2024-02-26

## 2024-02-23 NOTE — PROGRESS NOTES
[facility]      Patient Name: Jered Morejon  :1950  Age: 73 y.o.  Encounter Provider:  Soren Washington Jr, MD      Chief Complaint: Follow-up moderate alcohol intake, chronic atrial fibrillation      HPI  Jered Morejon is a 73 y.o. male past medical history of chronic atrial fibrillation, heart failure with recovered ejection fraction and reported history of at least moderate alcohol intake who presents for follow-up evaluation.     Last clinic visit note: Previously followed by Dr. Ariza and I reviewed all pertinent electronic health records.  He has been following with TALHA Smith with chronic stable complaints.  He denies palpitations, dizziness or syncope.  EKG today shows atrial fibrillation with chronic right bundle branch block and no evidence of ischemia.  He states that he remains fairly active with no limiting symptoms.  He notes increased fatigue.  He drinks at least 4 beers daily and stays up late into the evening and sleeps until 12:00 in the afternoon.  He is wondering why he is fatigued at about 6:00 at night.  His main complaint today is erectile dysfunction and he is concerned about the medications he is taking.  No bleeding complications on anticoagulation.  No cardiac complaints at time of interview.    He was moderately hypervolemic at last visit and I sent him over to the [facility] ER.  They felt that he was much improved after 1 dose of IV diuretics and sent him home.  He was hospitalized at [facility] 9 days later for congestive heart failure.  Echocardiogram showed reduced EF of 30 to 35%.  Medications were adjusted and he was told to stop drinking.  He has been more compliant with medications and has not been drinking over the last 6 weeks.  He was getting orthostatic with diuretics and we held them in favor of weight monitoring and he is euvolemic today in clinic.  He has been trying to do his best with low-sodium diet.  Digoxin was added and he  "has been tolerating this well.  No bleeding complications on Xarelto.    The following portions of the patient's history were reviewed and updated as appropriate: allergies, current medications, past family history, past medical history, past social history, past surgical history and problem list.      Review of Systems   Constitutional: Positive for malaise/fatigue. Negative for chills and fever.   HENT:  Negative for hoarse voice and sore throat.    Eyes:  Negative for double vision and photophobia.   Cardiovascular:  Negative for chest pain, leg swelling, near-syncope, orthopnea, palpitations, paroxysmal nocturnal dyspnea and syncope.   Respiratory:  Negative for cough and wheezing.    Skin:  Negative for poor wound healing and rash.   Musculoskeletal:  Negative for arthritis and joint swelling.   Gastrointestinal:  Negative for bloating, abdominal pain, hematemesis and hematochezia.   Neurological:  Negative for dizziness and focal weakness.   Psychiatric/Behavioral:  Negative for depression and suicidal ideas.        OBJECTIVE:   Vital Signs  Vitals:    02/23/24 1417   BP: 124/84   Pulse: 86   Resp: 16   SpO2: 97%     Estimated body mass index is 26.63 kg/m² as calculated from the following:    Height as of this encounter: 170.2 cm (67\").    Weight as of this encounter: 77.1 kg (170 lb).    Vitals reviewed.   Constitutional:       Appearance: Not in distress. Chronically ill-appearing.   Neck:      Vascular: No JVR. JVD normal.   Pulmonary:      Effort: Pulmonary effort is normal.      Breath sounds: No wheezing. No rhonchi. No rales.   Chest:      Chest wall: Not tender to palpatation.   Cardiovascular:      PMI at left midclavicular line. Normal rate. Irregularly irregular rhythm. Normal S1. Normal S2.       Murmurs: There is no murmur.      No gallop.  No click. No rub.   Pulses:     Intact distal pulses.   Edema:     Peripheral edema absent.   Abdominal:      General: Bowel sounds are normal.      " Palpations: Abdomen is soft.      Tenderness: There is no abdominal tenderness.   Musculoskeletal: Normal range of motion.         General: No tenderness. Skin:     General: Skin is warm and dry.   Neurological:      General: No focal deficit present.      Mental Status: Alert and oriented to person, place and time.       Procedures       BUN   Date Value Ref Range Status   01/24/2024 20 8 - 23 mg/dL Final     Creatinine   Date Value Ref Range Status   01/24/2024 0.97 0.76 - 1.27 mg/dL Final     Potassium   Date Value Ref Range Status   01/24/2024 3.6 3.5 - 5.2 mmol/L Final     ALT (SGPT)   Date Value Ref Range Status   01/20/2024 27 1 - 41 U/L Final     AST (SGOT)   Date Value Ref Range Status   01/20/2024 18 1 - 40 U/L Final           ASSESSMENT:     73-year-old male with past medical history of chronic atrial fibrillation presents for routine follow-up      PLAN OF CARE:     Acute on chronic systolic heart failure -patient had recovered EF in the past but his EF is down now.  He was started on Entresto and continued on carvedilol.  I will add low-dose spironolactone.  We will continue Lasix as needed as he is euvolemic today in clinic.  He is asking me about having a few drinks per week and I do not think this is a good idea for him and recommended against it.  In May we will have him back to clinic and repeat echocardiogram.  No angina.  No indication for ischemic workup.  Chronic atrial fibrillation -due to patient concern for adverse effect of beta-blocker we switched him over to diltiazem.  Heart rate is elevated today in clinic however he seems to be in heart failure.  Once he is evaluated in the ER and we get him feeling better I will place a Holter monitor to see how adequate rate control is.  Alcohol use -counseled on need for significant reduction if not cessation and abstinence  Primary hypertension -fair control today in clinic.  Sodium restricted diet.  Twice daily blood pressure log.    Return to  clinic 3 months             Discharge Medications            Accurate as of February 23, 2024  3:57 PM. If you have any questions, ask your nurse or doctor.                Continue These Medications        Instructions Start Date   buPROPion  MG 24 hr tablet  Commonly known as: WELLBUTRIN XL   300 mg, Oral, Daily      carvedilol 6.25 MG tablet  Commonly known as: COREG   6.25 mg, Oral, 2 Times Daily With Meals      digoxin 125 MCG tablet  Commonly known as: LANOXIN   125 mcg, Oral, Daily Digoxin      fluticasone 50 MCG/ACT nasal spray  Commonly known as: FLONASE   1 spray, Nasal, Daily      furosemide 40 MG tablet  Commonly known as: LASIX   40 mg, Oral, 2 Times Daily      methocarbamol 750 MG tablet  Commonly known as: ROBAXIN   750 mg, Oral, 3 Times Daily, As needed      potassium chloride 20 MEQ CR tablet  Commonly known as: K-DUR,KLOR-CON   20 mEq, Daily      sacubitril-valsartan 24-26 MG tablet  Commonly known as: ENTRESTO   1 tablet, Oral, Every 12 Hours Scheduled      thiamine 100 MG tablet  Commonly known as: VITAMIN B1   100 mg, Oral, Daily      traZODone 50 MG tablet  Commonly known as: DESYREL   50 mg, Oral, Nightly PRN, for sleep      Xarelto 20 MG tablet  Generic drug: rivaroxaban   TAKE 1 TABLET DAILY               Thank you for allowing me to participate in the care of your patient,      Sincerely,   Soren Washington Jr, MD  [facility]  02/23/24  15:57 EST    \

## 2024-02-26 RX ORDER — SPIRONOLACTONE 25 MG/1
12.5 TABLET ORAL DAILY
Qty: 45 TABLET | Refills: 3 | Status: SHIPPED | OUTPATIENT
Start: 2024-02-26

## 2024-02-26 RX ORDER — FUROSEMIDE 40 MG/1
40 TABLET ORAL 2 TIMES DAILY PRN
Start: 2024-02-26

## 2024-02-29 RX ORDER — LISINOPRIL 2.5 MG/1
TABLET ORAL
Qty: 90 TABLET | Refills: 3 | OUTPATIENT
Start: 2024-02-29

## 2024-03-12 ENCOUNTER — LAB (OUTPATIENT)
Dept: LAB | Facility: HOSPITAL | Age: 74
End: 2024-03-12
Payer: MEDICARE

## 2024-03-12 DIAGNOSIS — I50.43 ACUTE ON CHRONIC COMBINED SYSTOLIC AND DIASTOLIC CHF (CONGESTIVE HEART FAILURE): ICD-10-CM

## 2024-03-12 DIAGNOSIS — I50.43 ACUTE ON CHRONIC COMBINED SYSTOLIC AND DIASTOLIC CHF (CONGESTIVE HEART FAILURE): Primary | ICD-10-CM

## 2024-03-12 LAB
ANION GAP SERPL CALCULATED.3IONS-SCNC: 9.1 MMOL/L (ref 5–15)
BUN SERPL-MCNC: 11 MG/DL (ref 8–23)
BUN/CREAT SERPL: 9.1 (ref 7–25)
CALCIUM SPEC-SCNC: 9.6 MG/DL (ref 8.6–10.5)
CHLORIDE SERPL-SCNC: 105 MMOL/L (ref 98–107)
CO2 SERPL-SCNC: 24.9 MMOL/L (ref 22–29)
CREAT SERPL-MCNC: 1.21 MG/DL (ref 0.76–1.27)
EGFRCR SERPLBLD CKD-EPI 2021: 63.2 ML/MIN/1.73
GLUCOSE SERPL-MCNC: 112 MG/DL (ref 65–99)
POTASSIUM SERPL-SCNC: 4.8 MMOL/L (ref 3.5–5.2)
SODIUM SERPL-SCNC: 139 MMOL/L (ref 136–145)

## 2024-03-12 PROCEDURE — 36415 COLL VENOUS BLD VENIPUNCTURE: CPT

## 2024-03-12 PROCEDURE — 80048 BASIC METABOLIC PNL TOTAL CA: CPT

## 2024-03-13 ENCOUNTER — OFFICE VISIT (OUTPATIENT)
Dept: CARDIOLOGY | Facility: CLINIC | Age: 74
End: 2024-03-13
Payer: MEDICARE

## 2024-03-13 ENCOUNTER — TELEPHONE (OUTPATIENT)
Dept: CARDIOLOGY | Facility: HOSPITAL | Age: 74
End: 2024-03-13
Payer: MEDICARE

## 2024-03-13 VITALS
WEIGHT: 165.4 LBS | HEART RATE: 94 BPM | OXYGEN SATURATION: 96 % | SYSTOLIC BLOOD PRESSURE: 120 MMHG | DIASTOLIC BLOOD PRESSURE: 80 MMHG | HEIGHT: 68 IN | BODY MASS INDEX: 25.07 KG/M2

## 2024-03-13 DIAGNOSIS — I45.2 RBBB (RIGHT BUNDLE BRANCH BLOCK WITH LEFT ANTERIOR FASCICULAR BLOCK): ICD-10-CM

## 2024-03-13 DIAGNOSIS — I50.43 ACUTE ON CHRONIC COMBINED SYSTOLIC AND DIASTOLIC CHF (CONGESTIVE HEART FAILURE): Primary | ICD-10-CM

## 2024-03-13 DIAGNOSIS — I48.21 PERMANENT ATRIAL FIBRILLATION: ICD-10-CM

## 2024-03-13 DIAGNOSIS — I10 PRIMARY HYPERTENSION: ICD-10-CM

## 2024-03-13 NOTE — PROGRESS NOTES
[facility]      Patient Name: Jered Morejon  :1950  Age: 73 y.o.  Encounter Provider:  Soren Washington Jr, MD      Chief Complaint: Follow-up moderate alcohol intake, chronic atrial fibrillation      HPI  Jered Morejon is a 73 y.o. male past medical history of chronic atrial fibrillation, heart failure with recovered ejection fraction and reported history of at least moderate alcohol intake who presents for follow-up evaluation.     Last clinic visit note: Previously followed by Dr. Ariza and I reviewed all pertinent electronic health records.  He has been following with TALHA Smith with chronic stable complaints.  He denies palpitations, dizziness or syncope.  EKG today shows atrial fibrillation with chronic right bundle branch block and no evidence of ischemia.  He states that he remains fairly active with no limiting symptoms.  He notes increased fatigue.  He drinks at least 4 beers daily and stays up late into the evening and sleeps until 12:00 in the afternoon.  He is wondering why he is fatigued at about 6:00 at night.  His main complaint today is erectile dysfunction and he is concerned about the medications he is taking.  No bleeding complications on anticoagulation.  No cardiac complaints at time of interview.    He was moderately hypervolemic at last visit and I sent him over to the [facility] ER.  They felt that he was much improved after 1 dose of IV diuretics and sent him home.  He was hospitalized at [facility] 9 days later for congestive heart failure.  Echocardiogram showed reduced EF of 30 to 35%.  Medications were adjusted and he was told to stop drinking.  He has been more compliant with medications and has not been drinking over the last 6 weeks.  He was getting orthostatic with diuretics and we held them in favor of weight monitoring and he is euvolemic today in clinic.  He has been trying to do his best with low-sodium diet.  Digoxin was added and he  "has been tolerating this well.  No bleeding complications on Xarelto.    He is doing fairly well since last visit.  He is back down to near baseline weight and we have reduced the Lasix dosing to only as needed for weight gain.  Blood pressure and heart rate are well-controlled today in clinic.  He does note that he is losing about 1/2 pound daily but states that his appetite is good and his functional capacity is fair.  No heart failure symptoms.  He had a bump in baseline creatinine and potassium after adding spironolactone but all within normal range at this time.  Overall he has no complaints and tells me that he quit drinking alcohol.    The following portions of the patient's history were reviewed and updated as appropriate: allergies, current medications, past family history, past medical history, past social history, past surgical history and problem list.      Review of Systems   Constitutional: Positive for malaise/fatigue. Negative for chills and fever.   HENT:  Negative for hoarse voice and sore throat.    Eyes:  Negative for double vision and photophobia.   Cardiovascular:  Negative for chest pain, leg swelling, near-syncope, orthopnea, palpitations, paroxysmal nocturnal dyspnea and syncope.   Respiratory:  Negative for cough and wheezing.    Skin:  Negative for poor wound healing and rash.   Musculoskeletal:  Negative for arthritis and joint swelling.   Gastrointestinal:  Negative for bloating, abdominal pain, hematemesis and hematochezia.   Neurological:  Negative for dizziness and focal weakness.   Psychiatric/Behavioral:  Negative for depression and suicidal ideas.        OBJECTIVE:   Vital Signs  Vitals:    03/13/24 1238   BP: 120/80   Pulse: 94   SpO2: 96%     Estimated body mass index is 25.15 kg/m² as calculated from the following:    Height as of this encounter: 172.7 cm (68\").    Weight as of this encounter: 75 kg (165 lb 6.4 oz).    Vitals reviewed.   Constitutional:       Appearance: Not in " distress. Chronically ill-appearing.   Neck:      Vascular: No JVR. JVD normal.   Pulmonary:      Effort: Pulmonary effort is normal.      Breath sounds: No wheezing. No rhonchi. No rales.   Chest:      Chest wall: Not tender to palpatation.   Cardiovascular:      PMI at left midclavicular line. Normal rate. Irregularly irregular rhythm. Normal S1. Normal S2.       Murmurs: There is no murmur.      No gallop.  No click. No rub.   Pulses:     Intact distal pulses.   Edema:     Peripheral edema absent.   Abdominal:      General: Bowel sounds are normal.      Palpations: Abdomen is soft.      Tenderness: There is no abdominal tenderness.   Musculoskeletal: Normal range of motion.         General: No tenderness. Skin:     General: Skin is warm and dry.   Neurological:      General: No focal deficit present.      Mental Status: Alert and oriented to person, place and time.     Procedures       BUN   Date Value Ref Range Status   03/12/2024 11 8 - 23 mg/dL Final     Creatinine   Date Value Ref Range Status   03/12/2024 1.21 0.76 - 1.27 mg/dL Final     Potassium   Date Value Ref Range Status   03/12/2024 4.8 3.5 - 5.2 mmol/L Final     Comment:     Slight hemolysis detected by analyzer. Result may be falsely elevated.     ALT (SGPT)   Date Value Ref Range Status   01/20/2024 27 1 - 41 U/L Final     AST (SGOT)   Date Value Ref Range Status   01/20/2024 18 1 - 40 U/L Final           ASSESSMENT:     73-year-old male with past medical history of chronic atrial fibrillation presents for routine follow-up      PLAN OF CARE:     Acute on chronic systolic heart failure -hopefully on GDMT for heart failure and with cutting alcohol out we will see a recovery of LV function.  Normally I would consider adding an SGLT2 inhibitor but he had a small bump in creatinine last visit so I will hold off at this time.  He has an echocardiogram planned for May and I will have him see TALHA Smith at that time.  If  EF remains low at that  time we will likely plan for ischemic workup.  Chronic atrial fibrillation -rate controlled at this time.  No bleeding complications on rivaroxaban.  Alcohol use -I am thankful that he quit drinking and I am hopeful that this will promote recovery of LV function  Primary hypertension -fair control today in clinic.  Sodium restricted diet.  Twice daily blood pressure log.    Return to clinic May 2024 with TALHA Smith             Discharge Medications            Accurate as of March 13, 2024 12:41 PM. If you have any questions, ask your nurse or doctor.                Continue These Medications        Instructions Start Date   buPROPion  MG 24 hr tablet  Commonly known as: WELLBUTRIN XL   300 mg, Oral, Daily      carvedilol 6.25 MG tablet  Commonly known as: COREG   6.25 mg, Oral, 2 Times Daily With Meals      digoxin 125 MCG tablet  Commonly known as: LANOXIN   125 mcg, Oral, Daily Digoxin      fluticasone 50 MCG/ACT nasal spray  Commonly known as: FLONASE   1 spray, Nasal, Daily      furosemide 40 MG tablet  Commonly known as: LASIX   40 mg, Oral, 2 Times Daily PRN      methocarbamol 750 MG tablet  Commonly known as: ROBAXIN   750 mg, Oral, 3 Times Daily, As needed      sacubitril-valsartan 24-26 MG tablet  Commonly known as: ENTRESTO   1 tablet, Oral, Every 12 Hours Scheduled      spironolactone 25 MG tablet  Commonly known as: ALDACTONE   12.5 mg, Oral, Daily      traZODone 50 MG tablet  Commonly known as: DESYREL   50 mg, Oral, Nightly PRN, for sleep      Xarelto 20 MG tablet  Generic drug: rivaroxaban   TAKE 1 TABLET DAILY               Thank you for allowing me to participate in the care of your patient,      Sincerely,   Soren Washington MD  [facility]  03/13/24  12:41 EDT    \

## 2024-03-13 NOTE — PROGRESS NOTES
Please let the patient know that I have reviewed blood work and there are no concerning findings there.  Continue current medications as prescribed.

## 2024-03-13 NOTE — TELEPHONE ENCOUNTER
----- Message from Soren Washington Jr., MD sent at 3/13/2024  8:32 AM EDT -----  Please let the patient know that I have reviewed blood work and there are no concerning findings there.  Continue current medications as prescribed.

## 2024-03-13 NOTE — TELEPHONE ENCOUNTER
HUB- please put patient straight through to triage    Christina Lopez, QUIN  Triage RN  03/13/24 08:51 EDT

## 2024-03-14 RX ORDER — RIVAROXABAN 20 MG/1
TABLET, FILM COATED ORAL
Qty: 90 TABLET | Refills: 3 | Status: SHIPPED | OUTPATIENT
Start: 2024-03-14

## 2024-03-14 NOTE — TELEPHONE ENCOUNTER
Called and left VM, will continue to try to reach pt.    HUB- please put patient straight through to triage    Christina Lopez, RN  Triage RN  03/14/24 11:13 EDT

## 2024-04-22 ENCOUNTER — TELEPHONE (OUTPATIENT)
Dept: CARDIOLOGY | Facility: CLINIC | Age: 74
End: 2024-04-22
Payer: MEDICARE

## 2024-04-22 RX ORDER — DIGOXIN 125 MCG
125 TABLET ORAL
Qty: 90 TABLET | Refills: 1 | Status: SHIPPED | OUTPATIENT
Start: 2024-04-22

## 2024-04-22 NOTE — TELEPHONE ENCOUNTER
Caller: Jered Morejon    Relationship: Self    Best call back number: [phone]    Requested Prescriptions:   Requested Prescriptions     Pending Prescriptions Disp Refills    digoxin (LANOXIN) 125 MCG tablet 90 tablet 1     Sig: Take 1 tablet by mouth Daily.        Pharmacy where request should be sent: [facility] [address] [place] [address] [phone] [facility] [phone]      Last office visit with prescribing clinician: 3/13/2024   Last telemedicine visit with prescribing clinician: Visit date not found   Next office visit with prescribing clinician: Visit date not found       Does the patient have less than a 3 day supply:  [x] Yes  [] No    Would you like a call back once the refill request has been completed: [] Yes [x] No    If the office needs to give you a call back, can they leave a voicemail: [] Yes [x] No    Milton Calderon, RegEmanueled Rep   04/22/24 14:45 EDT

## 2024-05-06 ENCOUNTER — OFFICE VISIT (OUTPATIENT)
Dept: CARDIOLOGY | Facility: CLINIC | Age: 74
End: 2024-05-06
Payer: MEDICARE

## 2024-05-06 ENCOUNTER — TELEPHONE (OUTPATIENT)
Dept: CARDIOLOGY | Facility: CLINIC | Age: 74
End: 2024-05-06

## 2024-05-06 ENCOUNTER — TRANSCRIBE ORDERS (OUTPATIENT)
Dept: ADMINISTRATIVE | Facility: HOSPITAL | Age: 74
End: 2024-05-06
Payer: MEDICARE

## 2024-05-06 ENCOUNTER — HOSPITAL ENCOUNTER (OUTPATIENT)
Dept: CARDIOLOGY | Facility: HOSPITAL | Age: 74
Discharge: HOME | End: 2024-05-06
Admitting: INTERNAL MEDICINE
Payer: MEDICARE

## 2024-05-06 VITALS
WEIGHT: 165.8 LBS | HEART RATE: 78 BPM | HEIGHT: 68 IN | OXYGEN SATURATION: 99 % | SYSTOLIC BLOOD PRESSURE: 122 MMHG | BODY MASS INDEX: 25.13 KG/M2 | DIASTOLIC BLOOD PRESSURE: 77 MMHG

## 2024-05-06 VITALS
HEART RATE: 83 BPM | BODY MASS INDEX: 25.01 KG/M2 | HEIGHT: 68 IN | DIASTOLIC BLOOD PRESSURE: 70 MMHG | SYSTOLIC BLOOD PRESSURE: 112 MMHG | WEIGHT: 165 LBS

## 2024-05-06 DIAGNOSIS — I48.21 PERMANENT ATRIAL FIBRILLATION: ICD-10-CM

## 2024-05-06 DIAGNOSIS — R07.89 CHEST PAIN, ATYPICAL: ICD-10-CM

## 2024-05-06 DIAGNOSIS — R91.1 LUNG NODULE: Primary | ICD-10-CM

## 2024-05-06 DIAGNOSIS — I45.2 RBBB (RIGHT BUNDLE BRANCH BLOCK WITH LEFT ANTERIOR FASCICULAR BLOCK): Primary | ICD-10-CM

## 2024-05-06 DIAGNOSIS — I50.43 ACUTE ON CHRONIC COMBINED SYSTOLIC AND DIASTOLIC CHF (CONGESTIVE HEART FAILURE): ICD-10-CM

## 2024-05-06 LAB
AORTIC ARCH: 2.2 CM
AORTIC DIMENSIONLESS INDEX: 0.9 (DI)
ASCENDING AORTA: 3 CM
BH CV ECHO LEFT VENTRICLE GLOBAL LONGITUDINAL STRAIN: -15.3 %
BH CV ECHO MEAS - ACS: 1.55 CM
BH CV ECHO MEAS - AO MAX PG: 3 MMHG
BH CV ECHO MEAS - AO MEAN PG: 1.55 MMHG
BH CV ECHO MEAS - AO ROOT DIAM: 2.6 CM
BH CV ECHO MEAS - AO V2 MAX: 86.9 CM/SEC
BH CV ECHO MEAS - AO V2 VTI: 12.9 CM
BH CV ECHO MEAS - AVA(I,D): 2.9 CM2
BH CV ECHO MEAS - EDV(CUBED): 163.2 ML
BH CV ECHO MEAS - EDV(MOD-SP2): 190 ML
BH CV ECHO MEAS - EDV(MOD-SP4): 183 ML
BH CV ECHO MEAS - EF(MOD-BP): 33.5 %
BH CV ECHO MEAS - EF(MOD-SP2): 33.2 %
BH CV ECHO MEAS - EF(MOD-SP4): 33.9 %
BH CV ECHO MEAS - ESV(CUBED): 79.4 ML
BH CV ECHO MEAS - ESV(MOD-SP2): 127 ML
BH CV ECHO MEAS - ESV(MOD-SP4): 121 ML
BH CV ECHO MEAS - FS: 21.3 %
BH CV ECHO MEAS - IVS/LVPW: 0.95 CM
BH CV ECHO MEAS - IVSD: 0.93 CM
BH CV ECHO MEAS - LAT PEAK E' VEL: 8.1 CM/SEC
BH CV ECHO MEAS - LV DIASTOLIC VOL/BSA (35-75): 97.2 CM2
BH CV ECHO MEAS - LV MASS(C)D: 197.6 GRAMS
BH CV ECHO MEAS - LV MAX PG: 1.95 MMHG
BH CV ECHO MEAS - LV MEAN PG: 1.19 MMHG
BH CV ECHO MEAS - LV SYSTOLIC VOL/BSA (12-30): 64.2 CM2
BH CV ECHO MEAS - LV V1 MAX: 69.8 CM/SEC
BH CV ECHO MEAS - LV V1 VTI: 11.8 CM
BH CV ECHO MEAS - LVIDD: 5.5 CM
BH CV ECHO MEAS - LVIDS: 4.3 CM
BH CV ECHO MEAS - LVOT AREA: 3.1 CM2
BH CV ECHO MEAS - LVOT DIAM: 2 CM
BH CV ECHO MEAS - LVPWD: 0.97 CM
BH CV ECHO MEAS - MED PEAK E' VEL: 7.3 CM/SEC
BH CV ECHO MEAS - MV DEC SLOPE: 734.3 CM/SEC2
BH CV ECHO MEAS - MV DEC TIME: 0.15 SEC
BH CV ECHO MEAS - MV E MAX VEL: 98.1 CM/SEC
BH CV ECHO MEAS - MV MAX PG: 6.5 MMHG
BH CV ECHO MEAS - MV MEAN PG: 2.3 MMHG
BH CV ECHO MEAS - MV P1/2T: 51.4 MSEC
BH CV ECHO MEAS - MV V2 VTI: 26 CM
BH CV ECHO MEAS - MVA(P1/2T): 4.3 CM2
BH CV ECHO MEAS - MVA(VTI): 1.43 CM2
BH CV ECHO MEAS - PA ACC TIME: 0.09 SEC
BH CV ECHO MEAS - PA V2 MAX: 78.7 CM/SEC
BH CV ECHO MEAS - PI END-D VEL: 171 CM/SEC
BH CV ECHO MEAS - PULM DIAS VEL: 62.6 CM/SEC
BH CV ECHO MEAS - PULM S/D: 0.34
BH CV ECHO MEAS - PULM SYS VEL: 21.5 CM/SEC
BH CV ECHO MEAS - QP/QS: 0.89
BH CV ECHO MEAS - RAP SYSTOLE: 3 MMHG
BH CV ECHO MEAS - RV MAX PG: 1.42 MMHG
BH CV ECHO MEAS - RV V1 MAX: 59.6 CM/SEC
BH CV ECHO MEAS - RV V1 VTI: 9.9 CM
BH CV ECHO MEAS - RVOT DIAM: 2.06 CM
BH CV ECHO MEAS - RVSP: 43.7 MMHG
BH CV ECHO MEAS - SV(LVOT): 37.2 ML
BH CV ECHO MEAS - SV(MOD-SP2): 63 ML
BH CV ECHO MEAS - SV(MOD-SP4): 62 ML
BH CV ECHO MEAS - SV(RVOT): 32.9 ML
BH CV ECHO MEAS - SVI(LVOT): 19.7 ML/M2
BH CV ECHO MEAS - SVI(MOD-SP2): 33.4 ML/M2
BH CV ECHO MEAS - SVI(MOD-SP4): 32.9 ML/M2
BH CV ECHO MEAS - TAPSE (>1.6): 1.84 CM
BH CV ECHO MEAS - TR MAX PG: 40.7 MMHG
BH CV ECHO MEAS - TR MAX VEL: 319.1 CM/SEC
BH CV ECHO MEASUREMENTS AVERAGE E/E' RATIO: 12.74
BH CV XLRA - RV BASE: 3.1 CM
BH CV XLRA - RV LENGTH: 7.3 CM
BH CV XLRA - RV MID: 2.7 CM
BH CV XLRA - TDI S': 9 CM/SEC
LEFT ATRIUM VOLUME INDEX: 45.1 ML/M2
SINUS: 2.9 CM
STJ: 2.8 CM

## 2024-05-06 PROCEDURE — 93356 MYOCRD STRAIN IMG SPCKL TRCK: CPT | Performed by: INTERNAL MEDICINE

## 2024-05-06 PROCEDURE — 99214 OFFICE O/P EST MOD 30 MIN: CPT | Performed by: NURSE PRACTITIONER

## 2024-05-06 PROCEDURE — 93356 MYOCRD STRAIN IMG SPCKL TRCK: CPT

## 2024-05-06 PROCEDURE — 93306 TTE W/DOPPLER COMPLETE: CPT | Performed by: INTERNAL MEDICINE

## 2024-05-06 PROCEDURE — 93306 TTE W/DOPPLER COMPLETE: CPT

## 2024-05-06 PROCEDURE — 3078F DIAST BP <80 MM HG: CPT | Performed by: NURSE PRACTITIONER

## 2024-05-06 PROCEDURE — 1160F RVW MEDS BY RX/DR IN RCRD: CPT | Performed by: NURSE PRACTITIONER

## 2024-05-06 PROCEDURE — 25510000001 PERFLUTREN (DEFINITY) 8.476 MG IN SODIUM CHLORIDE (PF) 0.9 % 10 ML INJECTION: Performed by: INTERNAL MEDICINE

## 2024-05-06 PROCEDURE — 1159F MED LIST DOCD IN RCRD: CPT | Performed by: NURSE PRACTITIONER

## 2024-05-06 PROCEDURE — 3074F SYST BP LT 130 MM HG: CPT | Performed by: NURSE PRACTITIONER

## 2024-05-06 RX ADMIN — PERFLUTREN 2 ML: 6.52 INJECTION, SUSPENSION INTRAVENOUS at 14:42

## 2024-05-21 RX ORDER — CARVEDILOL 6.25 MG/1
6.25 TABLET ORAL 2 TIMES DAILY WITH MEALS
Qty: 180 TABLET | Refills: 1 | Status: SHIPPED | OUTPATIENT
Start: 2024-05-21

## 2024-05-21 RX ORDER — FUROSEMIDE 40 MG/1
40 TABLET ORAL 2 TIMES DAILY PRN
Qty: 60 TABLET | Refills: 1 | Status: SHIPPED | OUTPATIENT
Start: 2024-05-21

## 2024-05-21 RX ORDER — DIGOXIN 125 MCG
125 TABLET ORAL
Qty: 90 TABLET | Refills: 3 | Status: SHIPPED | OUTPATIENT
Start: 2024-05-21

## 2024-06-14 ENCOUNTER — HOSPITAL ENCOUNTER (OUTPATIENT)
Dept: CT IMAGING | Facility: HOSPITAL | Age: 74
Discharge: HOME | End: 2024-06-14
Payer: MEDICARE

## 2024-06-14 DIAGNOSIS — R91.1 LUNG NODULE: ICD-10-CM

## 2024-06-14 PROCEDURE — 71250 CT THORAX DX C-: CPT

## 2024-08-01 ENCOUNTER — TELEPHONE (OUTPATIENT)
Dept: CARDIOLOGY | Facility: CLINIC | Age: 74
End: 2024-08-01
Payer: MEDICARE

## 2024-08-05 ENCOUNTER — HOSPITAL ENCOUNTER (OUTPATIENT)
Dept: CARDIOLOGY | Facility: HOSPITAL | Age: 74
Discharge: HOME | End: 2024-08-05
Admitting: NURSE PRACTITIONER
Payer: MEDICARE

## 2024-08-05 VITALS — BODY MASS INDEX: 24.25 KG/M2 | HEIGHT: 68 IN | WEIGHT: 160 LBS

## 2024-08-05 DIAGNOSIS — R07.89 CHEST PAIN, ATYPICAL: ICD-10-CM

## 2024-08-05 DIAGNOSIS — I45.2 RBBB (RIGHT BUNDLE BRANCH BLOCK WITH LEFT ANTERIOR FASCICULAR BLOCK): ICD-10-CM

## 2024-08-05 DIAGNOSIS — I48.21 PERMANENT ATRIAL FIBRILLATION: ICD-10-CM

## 2024-08-05 LAB
BH CV NUCLEAR PRIOR STUDY: 2
BH CV REST NUCLEAR ISOTOPE DOSE: 10 MCI
BH CV STRESS BP STAGE 1: NORMAL
BH CV STRESS COMMENTS STAGE 1: NORMAL
BH CV STRESS DOSE REGADENOSON STAGE 1: 0.4
BH CV STRESS DURATION MIN STAGE 1: 0
BH CV STRESS DURATION SEC STAGE 1: 10
BH CV STRESS HR STAGE 1: 95
BH CV STRESS NUCLEAR ISOTOPE DOSE: 34.6 MCI
BH CV STRESS PROTOCOL 1: NORMAL
BH CV STRESS RECOVERY BP: NORMAL MMHG
BH CV STRESS RECOVERY HR: 100 BPM
BH CV STRESS STAGE 1: 1
LV EF NUC BP: 23 %
MAXIMAL PREDICTED HEART RATE: 147 BPM
PERCENT MAX PREDICTED HR: 64.63 %
STRESS BASELINE BP: NORMAL MMHG
STRESS BASELINE HR: 90 BPM
STRESS PERCENT HR: 76 %
STRESS POST EXERCISE DUR SEC: 10 SEC
STRESS POST PEAK BP: NORMAL MMHG
STRESS POST PEAK HR: 95 BPM
STRESS TARGET HR: 125 BPM

## 2024-08-05 PROCEDURE — 93016 CV STRESS TEST SUPVJ ONLY: CPT | Performed by: INTERNAL MEDICINE

## 2024-08-05 PROCEDURE — 78452 HT MUSCLE IMAGE SPECT MULT: CPT | Performed by: INTERNAL MEDICINE

## 2024-08-05 PROCEDURE — 0 TECHNETIUM TETROFOSMIN KIT: Performed by: NURSE PRACTITIONER

## 2024-08-05 PROCEDURE — A9502 TC99M TETROFOSMIN: HCPCS | Performed by: NURSE PRACTITIONER

## 2024-08-05 PROCEDURE — 25010000002 REGADENOSON 0.4 MG/5ML SOLUTION: Performed by: NURSE PRACTITIONER

## 2024-08-05 PROCEDURE — 93018 CV STRESS TEST I&R ONLY: CPT | Performed by: INTERNAL MEDICINE

## 2024-08-05 PROCEDURE — 78452 HT MUSCLE IMAGE SPECT MULT: CPT

## 2024-08-05 PROCEDURE — 93017 CV STRESS TEST TRACING ONLY: CPT

## 2024-08-05 RX ORDER — REGADENOSON 0.08 MG/ML
0.4 INJECTION, SOLUTION INTRAVENOUS
Status: COMPLETED | OUTPATIENT
Start: 2024-08-05 | End: 2024-08-05

## 2024-08-05 RX ADMIN — REGADENOSON 0.4 MG: 0.08 INJECTION, SOLUTION INTRAVENOUS at 14:18

## 2024-08-05 RX ADMIN — TETROFOSMIN 1 DOSE: 1.38 INJECTION, POWDER, LYOPHILIZED, FOR SOLUTION INTRAVENOUS at 14:18

## 2024-08-05 RX ADMIN — TETROFOSMIN 1 DOSE: 1.38 INJECTION, POWDER, LYOPHILIZED, FOR SOLUTION INTRAVENOUS at 13:45

## 2024-08-06 ENCOUNTER — TRANSCRIBE ORDERS (OUTPATIENT)
Dept: CARDIOLOGY | Facility: CLINIC | Age: 74
End: 2024-08-06

## 2024-08-06 ENCOUNTER — OFFICE VISIT (OUTPATIENT)
Dept: CARDIOLOGY | Facility: CLINIC | Age: 74
End: 2024-08-06
Payer: MEDICARE

## 2024-08-06 ENCOUNTER — LAB (OUTPATIENT)
Dept: LAB | Facility: HOSPITAL | Age: 74
End: 2024-08-06
Payer: MEDICARE

## 2024-08-06 VITALS
OXYGEN SATURATION: 96 % | BODY MASS INDEX: 25.67 KG/M2 | DIASTOLIC BLOOD PRESSURE: 82 MMHG | HEIGHT: 68 IN | HEART RATE: 90 BPM | WEIGHT: 169.4 LBS | SYSTOLIC BLOOD PRESSURE: 138 MMHG

## 2024-08-06 DIAGNOSIS — I48.19 ATRIAL FIBRILLATION, PERSISTENT: ICD-10-CM

## 2024-08-06 DIAGNOSIS — I50.23 ACUTE ON CHRONIC SYSTOLIC CHF (CONGESTIVE HEART FAILURE): Primary | ICD-10-CM

## 2024-08-06 DIAGNOSIS — Z01.810 PRE-OPERATIVE CARDIOVASCULAR EXAMINATION: ICD-10-CM

## 2024-08-06 DIAGNOSIS — Z13.6 SCREENING FOR ISCHEMIC HEART DISEASE: ICD-10-CM

## 2024-08-06 DIAGNOSIS — Z13.6 SCREENING FOR ISCHEMIC HEART DISEASE: Primary | ICD-10-CM

## 2024-08-06 DIAGNOSIS — G47.10 HYPERSOMNIA: ICD-10-CM

## 2024-08-06 LAB
ANION GAP SERPL CALCULATED.3IONS-SCNC: 7.9 MMOL/L (ref 5–15)
BASOPHILS # BLD AUTO: 0.04 10*3/MM3 (ref 0–0.2)
BASOPHILS NFR BLD AUTO: 0.5 % (ref 0–1.5)
BUN SERPL-MCNC: 12 MG/DL (ref 8–23)
BUN/CREAT SERPL: 9.9 (ref 7–25)
CALCIUM SPEC-SCNC: 9.6 MG/DL (ref 8.6–10.5)
CHLORIDE SERPL-SCNC: 103 MMOL/L (ref 98–107)
CO2 SERPL-SCNC: 31.1 MMOL/L (ref 22–29)
CREAT SERPL-MCNC: 1.21 MG/DL (ref 0.76–1.27)
DEPRECATED RDW RBC AUTO: 44.6 FL (ref 37–54)
EGFRCR SERPLBLD CKD-EPI 2021: 63.2 ML/MIN/1.73
EOSINOPHIL # BLD AUTO: 0.05 10*3/MM3 (ref 0–0.4)
EOSINOPHIL NFR BLD AUTO: 0.6 % (ref 0.3–6.2)
ERYTHROCYTE [DISTWIDTH] IN BLOOD BY AUTOMATED COUNT: 13.3 % (ref 12.3–15.4)
GLUCOSE SERPL-MCNC: 103 MG/DL (ref 65–99)
HCT VFR BLD AUTO: 46.7 % (ref 37.5–51)
HGB BLD-MCNC: 14.8 G/DL (ref 13–17.7)
IMM GRANULOCYTES # BLD AUTO: 0.03 10*3/MM3 (ref 0–0.05)
IMM GRANULOCYTES NFR BLD AUTO: 0.4 % (ref 0–0.5)
LYMPHOCYTES # BLD AUTO: 1.55 10*3/MM3 (ref 0.7–3.1)
LYMPHOCYTES NFR BLD AUTO: 18.6 % (ref 19.6–45.3)
MCH RBC QN AUTO: 29 PG (ref 26.6–33)
MCHC RBC AUTO-ENTMCNC: 31.7 G/DL (ref 31.5–35.7)
MCV RBC AUTO: 91.4 FL (ref 79–97)
MONOCYTES # BLD AUTO: 0.87 10*3/MM3 (ref 0.1–0.9)
MONOCYTES NFR BLD AUTO: 10.4 % (ref 5–12)
NEUTROPHILS NFR BLD AUTO: 5.81 10*3/MM3 (ref 1.7–7)
NEUTROPHILS NFR BLD AUTO: 69.5 % (ref 42.7–76)
NRBC BLD AUTO-RTO: 0 /100 WBC (ref 0–0.2)
PLATELET # BLD AUTO: 196 10*3/MM3 (ref 140–450)
PMV BLD AUTO: 10.8 FL (ref 6–12)
POTASSIUM SERPL-SCNC: 4 MMOL/L (ref 3.5–5.2)
RBC # BLD AUTO: 5.11 10*6/MM3 (ref 4.14–5.8)
SODIUM SERPL-SCNC: 142 MMOL/L (ref 136–145)
WBC NRBC COR # BLD AUTO: 8.35 10*3/MM3 (ref 3.4–10.8)

## 2024-08-06 PROCEDURE — 1159F MED LIST DOCD IN RCRD: CPT | Performed by: INTERNAL MEDICINE

## 2024-08-06 PROCEDURE — 99214 OFFICE O/P EST MOD 30 MIN: CPT | Performed by: INTERNAL MEDICINE

## 2024-08-06 PROCEDURE — 1160F RVW MEDS BY RX/DR IN RCRD: CPT | Performed by: INTERNAL MEDICINE

## 2024-08-06 PROCEDURE — 3075F SYST BP GE 130 - 139MM HG: CPT | Performed by: INTERNAL MEDICINE

## 2024-08-06 PROCEDURE — 3079F DIAST BP 80-89 MM HG: CPT | Performed by: INTERNAL MEDICINE

## 2024-08-06 PROCEDURE — 85025 COMPLETE CBC W/AUTO DIFF WBC: CPT

## 2024-08-06 PROCEDURE — 80048 BASIC METABOLIC PNL TOTAL CA: CPT

## 2024-08-06 PROCEDURE — 36415 COLL VENOUS BLD VENIPUNCTURE: CPT

## 2024-08-06 RX ORDER — OMEGA-3S/DHA/EPA/FISH OIL/D3 300MG-1000
CAPSULE ORAL DAILY
COMMUNITY
Start: 2024-06-04

## 2024-08-06 RX ORDER — AZELASTINE HYDROCHLORIDE, FLUTICASONE PROPIONATE 137; 50 UG/1; UG/1
1 SPRAY, METERED NASAL EVERY 12 HOURS
COMMUNITY
Start: 2024-06-04

## 2024-08-06 NOTE — PROGRESS NOTES
[facility]      Patient Name: Jered Morejon  :1950  Age: 73 y.o.  Encounter Provider:  Soren Washington Jr, MD      Chief Complaint: Follow-up moderate alcohol intake, chronic atrial fibrillation      HPI  Jered Morejon is a 73 y.o. male past medical history of chronic atrial fibrillation, heart failure with recovered ejection fraction and reported history of at least moderate alcohol intake who presents for follow-up evaluation.     Last clinic visit note: Previously followed by Dr. Ariza and I reviewed all pertinent electronic health records.  He has been following with TALHA Smith with chronic stable complaints.  He denies palpitations, dizziness or syncope.  EKG today shows atrial fibrillation with chronic right bundle branch block and no evidence of ischemia.  He states that he remains fairly active with no limiting symptoms.  He notes increased fatigue.  He drinks at least 4 beers daily and stays up late into the evening and sleeps until 12:00 in the afternoon.  He is wondering why he is fatigued at about 6:00 at night.  His main complaint today is erectile dysfunction and he is concerned about the medications he is taking.  No bleeding complications on anticoagulation.  No cardiac complaints at time of interview.    He was moderately hypervolemic at last visit and I sent him over to the [facility] ER.  They felt that he was much improved after 1 dose of IV diuretics and sent him home.  He was hospitalized at [facility] [place] 9 days later for congestive heart failure.  Echocardiogram showed reduced EF of 30 to 35%.  Medications were adjusted and he was told to stop drinking.  He has been more compliant with medications and has not been drinking over the last 6 weeks.  He was getting orthostatic with diuretics and we held them in favor of weight monitoring and he is euvolemic today in clinic.  He has been trying to do his best with low-sodium diet.  Digoxin was added and he  "has been tolerating this well.  No bleeding complications on Xarelto.    He has progressive dyspnea on exertion since last visit.  EF down to 30 to 35% on echocardiogram with regional wall motion abnormalities.  A stress study was ordered and showed questionable irreversible defect with no clear evidence of ischemia.  He is fairly euvolemic today on exam.  He is on maximally tolerated GDMT for heart failure.  Spironolactone was stopped due to severe dehydration and symptomatic low blood pressure.  Blood pressure and heart rate are fairly well-controlled today in clinic.    The following portions of the patient's history were reviewed and updated as appropriate: allergies, current medications, past family history, past medical history, past social history, past surgical history and problem list.      Review of Systems   Constitutional: Positive for malaise/fatigue. Negative for chills and fever.   HENT:  Negative for hoarse voice and sore throat.    Eyes:  Negative for double vision and photophobia.   Cardiovascular:  Negative for chest pain, leg swelling, near-syncope, orthopnea, palpitations, paroxysmal nocturnal dyspnea and syncope.   Respiratory:  Negative for cough and wheezing.    Skin:  Negative for poor wound healing and rash.   Musculoskeletal:  Negative for arthritis and joint swelling.   Gastrointestinal:  Negative for bloating, abdominal pain, hematemesis and hematochezia.   Neurological:  Negative for dizziness and focal weakness.   Psychiatric/Behavioral:  Negative for depression and suicidal ideas.        OBJECTIVE:   Vital Signs  Vitals:    08/06/24 1435   BP: 138/82   Pulse: 90   SpO2: 96%     Estimated body mass index is 25.76 kg/m² as calculated from the following:    Height as of this encounter: 172.7 cm (68\").    Weight as of this encounter: 76.8 kg (169 lb 6.4 oz).    Vitals reviewed.   Constitutional:       Appearance: Not in distress. Chronically ill-appearing.   Neck:      Vascular: No JVR. " JVD normal.   Pulmonary:      Effort: Pulmonary effort is normal.      Breath sounds: No wheezing. No rhonchi. No rales.   Chest:      Chest wall: Not tender to palpatation.   Cardiovascular:      PMI at left midclavicular line. Normal rate. Irregularly irregular rhythm. Normal S1. Normal S2.       Murmurs: There is no murmur.      No gallop.  No click. No rub.   Pulses:     Intact distal pulses.   Edema:     Peripheral edema absent.   Abdominal:      General: Bowel sounds are normal.      Palpations: Abdomen is soft.      Tenderness: There is no abdominal tenderness.   Musculoskeletal: Normal range of motion.         General: No tenderness. Skin:     General: Skin is warm and dry.   Neurological:      General: No focal deficit present.      Mental Status: Alert and oriented to person, place and time.     Procedures       BUN   Date Value Ref Range Status   03/12/2024 11 8 - 23 mg/dL Final     Creatinine   Date Value Ref Range Status   03/12/2024 1.21 0.76 - 1.27 mg/dL Final     Potassium   Date Value Ref Range Status   03/12/2024 4.8 3.5 - 5.2 mmol/L Final     Comment:     Slight hemolysis detected by analyzer. Result may be falsely elevated.     ALT (SGPT)   Date Value Ref Range Status   01/20/2024 27 1 - 41 U/L Final     AST (SGOT)   Date Value Ref Range Status   01/20/2024 18 1 - 40 U/L Final           ASSESSMENT:     73-year-old male with past medical history of chronic atrial fibrillation presents for routine follow-up      PLAN OF CARE:     Acute on chronic systolic heart failure -EF in 2016 showed normal left ventricular systolic function.  Despite negative stress study I am concerned with his progression of exertional symptoms with regional wall motion abnormalities and severely depressed EF.  While this may be tachycardia mediated or alcohol induced cardiomyopathy he has significant clinical risk factors and we will move forward with left and right heart catheterization as last review of coronary anatomy  was in 2013.  I will place a Holter monitor to see what his rate control looks like at this time.  He is on maximally tolerated GDMT for heart failure.  He states that he quit drinking a few months ago.  Once we have reviewed diagnostic testing results I will discuss the case with electrophysiology.  Chronic atrial fibrillation -rate controlled at this time.  No bleeding complications on rivaroxaban.  Alcohol use -I am thankful that he quit drinking and I am hopeful that this will promote recovery of LV function  Primary hypertension -fair control today in clinic.  Sodium restricted diet.  Twice daily blood pressure log.    Return to clinic 1 month             Discharge Medications            Accurate as of August 6, 2024  2:38 PM. If you have any questions, ask your nurse or doctor.                Continue These Medications        Instructions Start Date   Azelastine-Fluticasone 137-50 MCG/ACT suspension   1 spray, Nasal, Every 12 Hours      buPROPion  MG 24 hr tablet  Commonly known as: WELLBUTRIN XL   300 mg, Oral, Daily      carvedilol 6.25 MG tablet  Commonly known as: COREG   6.25 mg, Oral, 2 Times Daily With Meals      cholecalciferol 10 MCG (400 UNIT) tablet  Commonly known as: VITAMIN D3   Daily      digoxin 125 MCG tablet  Commonly known as: LANOXIN   125 mcg, Oral, Daily Digoxin      fluticasone 50 MCG/ACT nasal spray  Commonly known as: FLONASE   1 spray, Nasal, Daily      furosemide 40 MG tablet  Commonly known as: LASIX   40 mg, Oral, 2 Times Daily PRN      methocarbamol 750 MG tablet  Commonly known as: ROBAXIN   750 mg, Oral, 3 Times Daily, As needed      multivitamin with minerals tablet tablet   Daily      rivaroxaban 20 MG tablet  Commonly known as: Xarelto   20 mg, Oral, Daily      sacubitril-valsartan 24-26 MG tablet  Commonly known as: ENTRESTO   1 tablet, Oral, Every 12 Hours Scheduled      traZODone 50 MG tablet  Commonly known as: DESYREL   50 mg, Oral, Nightly PRN, for sleep                Thank you for allowing me to participate in the care of your patient,      Sincerely,   Soren Washington MD  [facility]  08/06/24  14:38 EDT    \

## 2024-08-06 NOTE — H&P (VIEW-ONLY)
[facility]      Patient Name: Jered Morejon  :1950  Age: 73 y.o.  Encounter Provider:  Soren Washington Jr, MD      Chief Complaint: Follow-up moderate alcohol intake, chronic atrial fibrillation      HPI  Jered Morejon is a 73 y.o. male past medical history of chronic atrial fibrillation, heart failure with recovered ejection fraction and reported history of at least moderate alcohol intake who presents for follow-up evaluation.     Last clinic visit note: Previously followed by Dr. Ariza and I reviewed all pertinent electronic health records.  He has been following with TALHA Smith with chronic stable complaints.  He denies palpitations, dizziness or syncope.  EKG today shows atrial fibrillation with chronic right bundle branch block and no evidence of ischemia.  He states that he remains fairly active with no limiting symptoms.  He notes increased fatigue.  He drinks at least 4 beers daily and stays up late into the evening and sleeps until 12:00 in the afternoon.  He is wondering why he is fatigued at about 6:00 at night.  His main complaint today is erectile dysfunction and he is concerned about the medications he is taking.  No bleeding complications on anticoagulation.  No cardiac complaints at time of interview.    He was moderately hypervolemic at last visit and I sent him over to the [facility] ER.  They felt that he was much improved after 1 dose of IV diuretics and sent him home.  He was hospitalized at [facility] 9 days later for congestive heart failure.  Echocardiogram showed reduced EF of 30 to 35%.  Medications were adjusted and he was told to stop drinking.  He has been more compliant with medications and has not been drinking over the last 6 weeks.  He was getting orthostatic with diuretics and we held them in favor of weight monitoring and he is euvolemic today in clinic.  He has been trying to do his best with low-sodium diet.  Digoxin was added and he  "has been tolerating this well.  No bleeding complications on Xarelto.    He has progressive dyspnea on exertion since last visit.  EF down to 30 to 35% on echocardiogram with regional wall motion abnormalities.  A stress study was ordered and showed questionable irreversible defect with no clear evidence of ischemia.  He is fairly euvolemic today on exam.  He is on maximally tolerated GDMT for heart failure.  Spironolactone was stopped due to severe dehydration and symptomatic low blood pressure.  Blood pressure and heart rate are fairly well-controlled today in clinic.    The following portions of the patient's history were reviewed and updated as appropriate: allergies, current medications, past family history, past medical history, past social history, past surgical history and problem list.      Review of Systems   Constitutional: Positive for malaise/fatigue. Negative for chills and fever.   HENT:  Negative for hoarse voice and sore throat.    Eyes:  Negative for double vision and photophobia.   Cardiovascular:  Negative for chest pain, leg swelling, near-syncope, orthopnea, palpitations, paroxysmal nocturnal dyspnea and syncope.   Respiratory:  Negative for cough and wheezing.    Skin:  Negative for poor wound healing and rash.   Musculoskeletal:  Negative for arthritis and joint swelling.   Gastrointestinal:  Negative for bloating, abdominal pain, hematemesis and hematochezia.   Neurological:  Negative for dizziness and focal weakness.   Psychiatric/Behavioral:  Negative for depression and suicidal ideas.        OBJECTIVE:   Vital Signs  Vitals:    08/06/24 1435   BP: 138/82   Pulse: 90   SpO2: 96%     Estimated body mass index is 25.76 kg/m² as calculated from the following:    Height as of this encounter: 172.7 cm (68\").    Weight as of this encounter: 76.8 kg (169 lb 6.4 oz).    Vitals reviewed.   Constitutional:       Appearance: Not in distress. Chronically ill-appearing.   Neck:      Vascular: No JVR. " JVD normal.   Pulmonary:      Effort: Pulmonary effort is normal.      Breath sounds: No wheezing. No rhonchi. No rales.   Chest:      Chest wall: Not tender to palpatation.   Cardiovascular:      PMI at left midclavicular line. Normal rate. Irregularly irregular rhythm. Normal S1. Normal S2.       Murmurs: There is no murmur.      No gallop.  No click. No rub.   Pulses:     Intact distal pulses.   Edema:     Peripheral edema absent.   Abdominal:      General: Bowel sounds are normal.      Palpations: Abdomen is soft.      Tenderness: There is no abdominal tenderness.   Musculoskeletal: Normal range of motion.         General: No tenderness. Skin:     General: Skin is warm and dry.   Neurological:      General: No focal deficit present.      Mental Status: Alert and oriented to person, place and time.     Procedures       BUN   Date Value Ref Range Status   03/12/2024 11 8 - 23 mg/dL Final     Creatinine   Date Value Ref Range Status   03/12/2024 1.21 0.76 - 1.27 mg/dL Final     Potassium   Date Value Ref Range Status   03/12/2024 4.8 3.5 - 5.2 mmol/L Final     Comment:     Slight hemolysis detected by analyzer. Result may be falsely elevated.     ALT (SGPT)   Date Value Ref Range Status   01/20/2024 27 1 - 41 U/L Final     AST (SGOT)   Date Value Ref Range Status   01/20/2024 18 1 - 40 U/L Final           ASSESSMENT:     73-year-old male with past medical history of chronic atrial fibrillation presents for routine follow-up      PLAN OF CARE:     Acute on chronic systolic heart failure -EF in 2016 showed normal left ventricular systolic function.  Despite negative stress study I am concerned with his progression of exertional symptoms with regional wall motion abnormalities and severely depressed EF.  While this may be tachycardia mediated or alcohol induced cardiomyopathy he has significant clinical risk factors and we will move forward with left and right heart catheterization as last review of coronary anatomy  was in 2013.  I will place a Holter monitor to see what his rate control looks like at this time.  He is on maximally tolerated GDMT for heart failure.  He states that he quit drinking a few months ago.  Once we have reviewed diagnostic testing results I will discuss the case with electrophysiology.  Chronic atrial fibrillation -rate controlled at this time.  No bleeding complications on rivaroxaban.  Alcohol use -I am thankful that he quit drinking and I am hopeful that this will promote recovery of LV function  Primary hypertension -fair control today in clinic.  Sodium restricted diet.  Twice daily blood pressure log.    Return to clinic 1 month             Discharge Medications            Accurate as of August 6, 2024  2:38 PM. If you have any questions, ask your nurse or doctor.                Continue These Medications        Instructions Start Date   Azelastine-Fluticasone 137-50 MCG/ACT suspension   1 spray, Nasal, Every 12 Hours      buPROPion  MG 24 hr tablet  Commonly known as: WELLBUTRIN XL   300 mg, Oral, Daily      carvedilol 6.25 MG tablet  Commonly known as: COREG   6.25 mg, Oral, 2 Times Daily With Meals      cholecalciferol 10 MCG (400 UNIT) tablet  Commonly known as: VITAMIN D3   Daily      digoxin 125 MCG tablet  Commonly known as: LANOXIN   125 mcg, Oral, Daily Digoxin      fluticasone 50 MCG/ACT nasal spray  Commonly known as: FLONASE   1 spray, Nasal, Daily      furosemide 40 MG tablet  Commonly known as: LASIX   40 mg, Oral, 2 Times Daily PRN      methocarbamol 750 MG tablet  Commonly known as: ROBAXIN   750 mg, Oral, 3 Times Daily, As needed      multivitamin with minerals tablet tablet   Daily      rivaroxaban 20 MG tablet  Commonly known as: Xarelto   20 mg, Oral, Daily      sacubitril-valsartan 24-26 MG tablet  Commonly known as: ENTRESTO   1 tablet, Oral, Every 12 Hours Scheduled      traZODone 50 MG tablet  Commonly known as: DESYREL   50 mg, Oral, Nightly PRN, for sleep                Thank you for allowing me to participate in the care of your patient,      Sincerely,   Soren Washington MD  [facility]  08/06/24  14:38 EDT    \

## 2024-08-07 ENCOUNTER — TELEPHONE (OUTPATIENT)
Dept: CARDIOLOGY | Facility: CLINIC | Age: 74
End: 2024-08-07
Payer: MEDICARE

## 2024-08-07 NOTE — TELEPHONE ENCOUNTER
Jered Morejon called with paperwork question.    Patient reports he saw Dr. Washington yesterday and has limited mobility.  Patient is asking if Dr. Washington can fill out paperwork for a handicap tag for him?  Patient also stated he does not have the paperwork/does not know how to go about getting a handicap tag.    He stated he will be in the office tomorrow to drop off his heart monitor.    Thank you,  Cynthia BULL RN  Triage Nurse [facility]   14:12 EDT

## 2024-08-08 NOTE — TELEPHONE ENCOUNTER
I called the patient but had to leave a voicemail message.  I placed the form up at the front office for the patient to ./kevin

## 2024-08-08 NOTE — TELEPHONE ENCOUNTER
Provided copy to Karine and Karine stated she will call patient.    Thank you,  Cynthia BULL RN  Triage Nurse [facility]   08:38 EDT

## 2024-08-16 ENCOUNTER — TRANSCRIBE ORDERS (OUTPATIENT)
Dept: ADMINISTRATIVE | Facility: HOSPITAL | Age: 74
End: 2024-08-16
Payer: MEDICARE

## 2024-08-16 ENCOUNTER — HOSPITAL ENCOUNTER (INPATIENT)
Facility: HOSPITAL | Age: 74
LOS: 3 days | Discharge: HOME | DRG: 287 | End: 2024-08-19
Attending: INTERNAL MEDICINE | Admitting: INTERNAL MEDICINE
Payer: MEDICARE

## 2024-08-16 DIAGNOSIS — I50.23 ACUTE ON CHRONIC SYSTOLIC CHF (CONGESTIVE HEART FAILURE): ICD-10-CM

## 2024-08-16 DIAGNOSIS — R91.1 LUNG NODULE: Primary | ICD-10-CM

## 2024-08-16 PROBLEM — I50.21 CHF (CONGESTIVE HEART FAILURE), NYHA CLASS III, ACUTE, SYSTOLIC: Status: ACTIVE | Noted: 2024-08-16

## 2024-08-16 PROCEDURE — 93005 ELECTROCARDIOGRAM TRACING: CPT | Performed by: INTERNAL MEDICINE

## 2024-08-16 PROCEDURE — C1894 INTRO/SHEATH, NON-LASER: HCPCS | Performed by: INTERNAL MEDICINE

## 2024-08-16 PROCEDURE — 90791 PSYCH DIAGNOSTIC EVALUATION: CPT

## 2024-08-16 PROCEDURE — 93010 ELECTROCARDIOGRAM REPORT: CPT | Performed by: INTERNAL MEDICINE

## 2024-08-16 PROCEDURE — 25010000002 HEPARIN (PORCINE) PER 1000 UNITS: Performed by: INTERNAL MEDICINE

## 2024-08-16 PROCEDURE — 25810000003 SODIUM CHLORIDE 0.9 % SOLUTION: Performed by: INTERNAL MEDICINE

## 2024-08-16 PROCEDURE — 93460 R&L HRT ART/VENTRICLE ANGIO: CPT | Performed by: INTERNAL MEDICINE

## 2024-08-16 PROCEDURE — 4A023N8 MEASUREMENT OF CARDIAC SAMPLING AND PRESSURE, BILATERAL, PERCUTANEOUS APPROACH: ICD-10-PCS | Performed by: INTERNAL MEDICINE

## 2024-08-16 PROCEDURE — 25510000001 IOPAMIDOL PER 1 ML: Performed by: INTERNAL MEDICINE

## 2024-08-16 PROCEDURE — 25010000002 ONDANSETRON PER 1 MG: Performed by: INTERNAL MEDICINE

## 2024-08-16 PROCEDURE — 25010000002 FENTANYL CITRATE (PF) 50 MCG/ML SOLUTION: Performed by: INTERNAL MEDICINE

## 2024-08-16 PROCEDURE — 94799 UNLISTED PULMONARY SVC/PX: CPT

## 2024-08-16 PROCEDURE — C1769 GUIDE WIRE: HCPCS | Performed by: INTERNAL MEDICINE

## 2024-08-16 PROCEDURE — 25010000002 FUROSEMIDE PER 20 MG: Performed by: INTERNAL MEDICINE

## 2024-08-16 PROCEDURE — B2151ZZ FLUOROSCOPY OF LEFT HEART USING LOW OSMOLAR CONTRAST: ICD-10-PCS | Performed by: INTERNAL MEDICINE

## 2024-08-16 PROCEDURE — 94761 N-INVAS EAR/PLS OXIMETRY MLT: CPT

## 2024-08-16 PROCEDURE — B2111ZZ FLUOROSCOPY OF MULTIPLE CORONARY ARTERIES USING LOW OSMOLAR CONTRAST: ICD-10-PCS | Performed by: INTERNAL MEDICINE

## 2024-08-16 PROCEDURE — 25010000002 THIAMINE PER 100 MG: Performed by: INTERNAL MEDICINE

## 2024-08-16 RX ORDER — LORAZEPAM 1 MG/1
2 TABLET ORAL EVERY 6 HOURS
Status: DISPENSED | OUTPATIENT
Start: 2024-08-16 | End: 2024-08-17

## 2024-08-16 RX ORDER — ONDANSETRON 2 MG/ML
4 INJECTION INTRAMUSCULAR; INTRAVENOUS EVERY 6 HOURS PRN
Status: DISCONTINUED | OUTPATIENT
Start: 2024-08-16 | End: 2024-08-19 | Stop reason: HOSPADM

## 2024-08-16 RX ORDER — TEMAZEPAM 15 MG/1
15 CAPSULE ORAL NIGHTLY PRN
Status: DISCONTINUED | OUTPATIENT
Start: 2024-08-16 | End: 2024-08-19 | Stop reason: HOSPADM

## 2024-08-16 RX ORDER — LORAZEPAM 2 MG/ML
2 INJECTION INTRAMUSCULAR
Status: DISCONTINUED | OUTPATIENT
Start: 2024-08-16 | End: 2024-08-19 | Stop reason: HOSPADM

## 2024-08-16 RX ORDER — IOPAMIDOL 755 MG/ML
INJECTION, SOLUTION INTRAVASCULAR
Status: DISCONTINUED | OUTPATIENT
Start: 2024-08-16 | End: 2024-08-16 | Stop reason: HOSPADM

## 2024-08-16 RX ORDER — NITROGLYCERIN 0.4 MG/1
0.4 TABLET SUBLINGUAL
Status: DISCONTINUED | OUTPATIENT
Start: 2024-08-16 | End: 2024-08-19 | Stop reason: HOSPADM

## 2024-08-16 RX ORDER — BUPROPION HYDROCHLORIDE 300 MG/1
300 TABLET ORAL DAILY
Status: DISCONTINUED | OUTPATIENT
Start: 2024-08-16 | End: 2024-08-19 | Stop reason: HOSPADM

## 2024-08-16 RX ORDER — SODIUM CHLORIDE 0.9 % (FLUSH) 0.9 %
10 SYRINGE (ML) INJECTION AS NEEDED
Status: DISCONTINUED | OUTPATIENT
Start: 2024-08-16 | End: 2024-08-16 | Stop reason: HOSPADM

## 2024-08-16 RX ORDER — LORAZEPAM 1 MG/1
1 TABLET ORAL EVERY 8 HOURS PRN
COMMUNITY

## 2024-08-16 RX ORDER — FOLIC ACID 1 MG/1
1 TABLET ORAL DAILY
Status: DISCONTINUED | OUTPATIENT
Start: 2024-08-16 | End: 2024-08-19 | Stop reason: HOSPADM

## 2024-08-16 RX ORDER — MULTIPLE VITAMINS W/ MINERALS TAB 9MG-400MCG
1 TAB ORAL DAILY
Status: DISCONTINUED | OUTPATIENT
Start: 2024-08-16 | End: 2024-08-19 | Stop reason: HOSPADM

## 2024-08-16 RX ORDER — DIGOXIN 125 MCG
125 TABLET ORAL
Status: DISCONTINUED | OUTPATIENT
Start: 2024-08-16 | End: 2024-08-19 | Stop reason: HOSPADM

## 2024-08-16 RX ORDER — LORAZEPAM 1 MG/1
1 TABLET ORAL
Status: DISCONTINUED | OUTPATIENT
Start: 2024-08-16 | End: 2024-08-19 | Stop reason: HOSPADM

## 2024-08-16 RX ORDER — THIAMINE HYDROCHLORIDE 100 MG/ML
200 INJECTION, SOLUTION INTRAMUSCULAR; INTRAVENOUS EVERY 8 HOURS SCHEDULED
Status: DISCONTINUED | OUTPATIENT
Start: 2024-08-16 | End: 2024-08-19 | Stop reason: HOSPADM

## 2024-08-16 RX ORDER — METHOCARBAMOL 750 MG/1
750 TABLET, FILM COATED ORAL 3 TIMES DAILY
Status: DISCONTINUED | OUTPATIENT
Start: 2024-08-16 | End: 2024-08-19 | Stop reason: HOSPADM

## 2024-08-16 RX ORDER — VERAPAMIL HYDROCHLORIDE 2.5 MG/ML
INJECTION, SOLUTION INTRAVENOUS
Status: DISCONTINUED | OUTPATIENT
Start: 2024-08-16 | End: 2024-08-16 | Stop reason: HOSPADM

## 2024-08-16 RX ORDER — LORAZEPAM 1 MG/1
1 TABLET ORAL EVERY 8 HOURS PRN
Status: DISCONTINUED | OUTPATIENT
Start: 2024-08-16 | End: 2024-08-19 | Stop reason: HOSPADM

## 2024-08-16 RX ORDER — LORAZEPAM 1 MG/1
2 TABLET ORAL
Status: DISCONTINUED | OUTPATIENT
Start: 2024-08-16 | End: 2024-08-19 | Stop reason: HOSPADM

## 2024-08-16 RX ORDER — LORAZEPAM 1 MG/1
1 TABLET ORAL EVERY 12 HOURS SCHEDULED
Status: COMPLETED | OUTPATIENT
Start: 2024-08-18 | End: 2024-08-18

## 2024-08-16 RX ORDER — TRAZODONE HYDROCHLORIDE 50 MG/1
50 TABLET, FILM COATED ORAL NIGHTLY PRN
Status: DISCONTINUED | OUTPATIENT
Start: 2024-08-16 | End: 2024-08-19 | Stop reason: HOSPADM

## 2024-08-16 RX ORDER — ONDANSETRON 4 MG/1
4 TABLET, ORALLY DISINTEGRATING ORAL EVERY 6 HOURS PRN
Status: DISCONTINUED | OUTPATIENT
Start: 2024-08-16 | End: 2024-08-19 | Stop reason: HOSPADM

## 2024-08-16 RX ORDER — LORAZEPAM 2 MG/ML
1 INJECTION INTRAMUSCULAR
Status: DISCONTINUED | OUTPATIENT
Start: 2024-08-16 | End: 2024-08-19 | Stop reason: HOSPADM

## 2024-08-16 RX ORDER — MORPHINE SULFATE 2 MG/ML
2 INJECTION, SOLUTION INTRAMUSCULAR; INTRAVENOUS
Status: DISCONTINUED | OUTPATIENT
Start: 2024-08-16 | End: 2024-08-19 | Stop reason: HOSPADM

## 2024-08-16 RX ORDER — FLUTICASONE PROPIONATE 50 MCG
1 SPRAY, SUSPENSION (ML) NASAL DAILY
Status: DISCONTINUED | OUTPATIENT
Start: 2024-08-17 | End: 2024-08-19 | Stop reason: HOSPADM

## 2024-08-16 RX ORDER — FUROSEMIDE 10 MG/ML
80 INJECTION INTRAMUSCULAR; INTRAVENOUS EVERY 12 HOURS
Status: DISCONTINUED | OUTPATIENT
Start: 2024-08-16 | End: 2024-08-18

## 2024-08-16 RX ORDER — HYDROCODONE BITARTRATE AND ACETAMINOPHEN 5; 325 MG/1; MG/1
1 TABLET ORAL EVERY 4 HOURS PRN
Status: DISCONTINUED | OUTPATIENT
Start: 2024-08-16 | End: 2024-08-19 | Stop reason: HOSPADM

## 2024-08-16 RX ORDER — LORAZEPAM 1 MG/1
1 TABLET ORAL DAILY
Status: COMPLETED | OUTPATIENT
Start: 2024-08-19 | End: 2024-08-19

## 2024-08-16 RX ORDER — CARVEDILOL 3.12 MG/1
6.25 TABLET ORAL 2 TIMES DAILY WITH MEALS
Status: DISCONTINUED | OUTPATIENT
Start: 2024-08-16 | End: 2024-08-19 | Stop reason: HOSPADM

## 2024-08-16 RX ORDER — HEPARIN SODIUM 1000 [USP'U]/ML
INJECTION, SOLUTION INTRAVENOUS; SUBCUTANEOUS
Status: DISCONTINUED | OUTPATIENT
Start: 2024-08-16 | End: 2024-08-16 | Stop reason: HOSPADM

## 2024-08-16 RX ORDER — LIDOCAINE HYDROCHLORIDE 20 MG/ML
INJECTION, SOLUTION INFILTRATION; PERINEURAL
Status: DISCONTINUED | OUTPATIENT
Start: 2024-08-16 | End: 2024-08-16 | Stop reason: HOSPADM

## 2024-08-16 RX ORDER — SODIUM CHLORIDE 9 MG/ML
75 INJECTION, SOLUTION INTRAVENOUS CONTINUOUS
Status: DISCONTINUED | OUTPATIENT
Start: 2024-08-16 | End: 2024-08-19 | Stop reason: HOSPADM

## 2024-08-16 RX ORDER — ACETAMINOPHEN 325 MG/1
650 TABLET ORAL EVERY 4 HOURS PRN
Status: DISCONTINUED | OUTPATIENT
Start: 2024-08-16 | End: 2024-08-19 | Stop reason: HOSPADM

## 2024-08-16 RX ORDER — FENTANYL CITRATE 50 UG/ML
INJECTION, SOLUTION INTRAMUSCULAR; INTRAVENOUS
Status: DISCONTINUED | OUTPATIENT
Start: 2024-08-16 | End: 2024-08-16 | Stop reason: HOSPADM

## 2024-08-16 RX ORDER — LORAZEPAM 1 MG/1
1 TABLET ORAL EVERY 6 HOURS
Status: COMPLETED | OUTPATIENT
Start: 2024-08-17 | End: 2024-08-18

## 2024-08-16 RX ORDER — NALOXONE HCL 0.4 MG/ML
0.4 VIAL (ML) INJECTION
Status: DISCONTINUED | OUTPATIENT
Start: 2024-08-16 | End: 2024-08-19 | Stop reason: HOSPADM

## 2024-08-16 RX ADMIN — SODIUM CHLORIDE 75 ML/HR: 9 INJECTION, SOLUTION INTRAVENOUS at 08:28

## 2024-08-16 RX ADMIN — SACUBITRIL AND VALSARTAN 1 TABLET: 24; 26 TABLET, FILM COATED ORAL at 21:17

## 2024-08-16 RX ADMIN — CARVEDILOL 6.25 MG: 3.12 TABLET, FILM COATED ORAL at 11:31

## 2024-08-16 RX ADMIN — ONDANSETRON 4 MG: 2 INJECTION, SOLUTION INTRAMUSCULAR; INTRAVENOUS at 10:20

## 2024-08-16 RX ADMIN — THIAMINE HYDROCHLORIDE 200 MG: 100 INJECTION, SOLUTION INTRAMUSCULAR; INTRAVENOUS at 21:17

## 2024-08-16 RX ADMIN — FOLIC ACID 1 MG: 1 TABLET ORAL at 11:31

## 2024-08-16 RX ADMIN — CARVEDILOL 6.25 MG: 3.12 TABLET, FILM COATED ORAL at 17:37

## 2024-08-16 RX ADMIN — SACUBITRIL AND VALSARTAN 1 TABLET: 24; 26 TABLET, FILM COATED ORAL at 11:31

## 2024-08-16 RX ADMIN — Medication 1 TABLET: at 11:31

## 2024-08-16 RX ADMIN — LORAZEPAM 2 MG: 1 TABLET ORAL at 21:15

## 2024-08-16 RX ADMIN — FUROSEMIDE 80 MG: 10 INJECTION, SOLUTION INTRAMUSCULAR; INTRAVENOUS at 21:18

## 2024-08-16 RX ADMIN — METHOCARBAMOL TABLETS 750 MG: 750 TABLET, COATED ORAL at 21:16

## 2024-08-16 RX ADMIN — LORAZEPAM 2 MG: 1 TABLET ORAL at 15:00

## 2024-08-16 RX ADMIN — DIGOXIN 125 MCG: 125 TABLET ORAL at 11:31

## 2024-08-16 RX ADMIN — FUROSEMIDE 80 MG: 10 INJECTION, SOLUTION INTRAMUSCULAR; INTRAVENOUS at 11:31

## 2024-08-16 RX ADMIN — THIAMINE HYDROCHLORIDE 200 MG: 100 INJECTION, SOLUTION INTRAMUSCULAR; INTRAVENOUS at 15:00

## 2024-08-16 RX ADMIN — BUPROPION HYDROCHLORIDE 300 MG: 300 TABLET, EXTENDED RELEASE ORAL at 11:31

## 2024-08-16 RX ADMIN — METHOCARBAMOL TABLETS 750 MG: 750 TABLET, COATED ORAL at 11:31

## 2024-08-16 NOTE — CASE MANAGEMENT/SOCIAL WORK
Discharge Planning Assessment  [facility]     Patient Name: Jered Morejon  MRN: [id number]  Today's Date: 8/16/2024    Admit Date: 8/16/2024    Plan: Home w/ spouse; Denies needs.   Discharge Needs Assessment       Row Name 08/16/24 1720       Living Environment    People in Home spouse    Name(s) of People in Home Levi Yonis/spouse    Current Living Arrangements home    Potentially Unsafe Housing Conditions none    In the past 12 months has the electric, gas, oil, or water company threatened to shut off services in your home? No    Primary Care Provided by self;spouse/significant other    Provides Primary Care For no one    Family Caregiver if Needed spouse    Family Caregiver Names LEVI    Quality of Family Relationships helpful;supportive       Resource/Environmental Concerns    Resource/Environmental Concerns none    Transportation Concerns none       Transportation Needs    In the past 12 months, has lack of transportation kept you from medical appointments or from getting medications? no    In the past 12 months, has lack of transportation kept you from meetings, work, or from getting things needed for daily living? No       Food Insecurity    Within the past 12 months, you worried that your food would run out before you got the money to buy more. Never true    Within the past 12 months, the food you bought just didn't last and you didn't have money to get more. Never true       Transition Planning    Patient/Family Anticipates Transition to home with family    Patient/Family Anticipated Services at Transition none    Transportation Anticipated family or friend will provide       Discharge Needs Assessment    Readmission Within the Last 30 Days no previous admission in last 30 days    Equipment Currently Used at Home other (see comments);walker, rolling;bp cuff;scales;grab bar;shower chair  STAIR/CHAIR LIFT    Concerns to be Addressed no discharge needs identified;denies needs/concerns at this time     Anticipated Changes Related to Illness none    Equipment Needed After Discharge none    Provided Post Acute Provider List? Refused    Refused Provider List Comment Patient declined need for list                   Discharge Plan       Row Name 08/16/24 1721       Plan    Plan Home w/ spouse; Denies needs.    Plan Comments CCP spoke with patient at bedside.  Explained role of CCP and discharge planning discussed.  Information on face sheet verified.  Patient stated he is IADL's, retired and drives.  Patient lives with spouse/Levi Somers in multi-level home with chair lift.  Patients PCP confirmed as, TALHA Trevizo.  Patient's pharmacy confirmed as, [facility] [place].  Pt is enrolled in [facility].  Patient has the following DME- stair/chair lift, rolling walker (don't use anymore), BP cuff, scale, grab bars and shower chair.  Patient has used [facility] in past and denies ever going to a sub-acute rehab.  Patient plans to return home at discharge.  Patient denies current discharge needs.  CCP will continue to follow….…Della AMBRIZ /IVAN.                  Continued Care and Services - Admitted Since 8/16/2024    No active coordination exists for this encounter.       Expected Discharge Date and Time       Expected Discharge Date Expected Discharge Time    Aug 18, 2024            Demographic Summary       Row Name 08/16/24 1718       General Information    Admission Type inpatient    Arrived From home    Required Notices Provided Important Message from Medicare    Referral Source admission list    Reason for Consult discharge planning    Preferred Language English       Contact Information    Permission Granted to Share Info With family/designee                   Functional Status       Row Name 08/16/24 1720       Functional Status    Usual Activity Tolerance moderate    Current Activity Tolerance moderate       Physical Activity    On average, how many days per week do you engage in moderate to  strenuous exercise (like a brisk walk)? 0 days    On average, how many minutes do you engage in exercise at this level? 0 min    Number of minutes of exercise per week 0       Assessment of Health Literacy    How often do you have someone help you read hospital materials? Occasionally    Health Literacy Moderate       Functional Status, IADL    Medications independent    Meal Preparation assistive person    Housekeeping assistive person    Laundry assistive person    Shopping assistive person       Mental Status    General Appearance WDL WDL       Mental Status Summary    Recent Changes in Mental Status/Cognitive Functioning no changes       Employment/    Employment Status retired                   Psychosocial    No documentation.                  Abuse/Neglect    No documentation.                  Legal    No documentation.                  Substance Abuse    No documentation.                  Patient Forms    No documentation.                     Della Cooper RN

## 2024-08-16 NOTE — CONSULTS
"Patient seen by [facility] d/t ETOH. Patient interviewed in room 3114 ([facility]) with visitor at bedside. Patient gave permission for visitor to remain present. Introduced self and role. Patient agreed to participate in evaluation. Patient is A/OX4. Patient was calm and pleasant during evaluation. Patient seen by [facility] in January, 2024 r/t ETOH. Please see previous consult note.     The patient is a 73 y.o.  male living with his wife.   Jain/Spiritual: Raised Episcopal but not actively practicing.  Children: 3 children  Occupation: [employer]  Hobbies: [employer]  Education: College  Legal: Denies  : No  Support System: Family, Friends  Hx of Violence: Denies  Hx of abuse/Trauma: Denies  Feel Safe at Home: Yes    The patient reported he is in the hospital d/t having a heart cath done. The patient was a direct admit and di not go through the ED. The patient has a history of chronic afib and heart failure.     The patient stated he has cut down his ETOH use since he started having medical issues in January, 2024. When seen in January the patient reported he had not drank ETOH in 2-3 weeks but reported before that he was drinking ETOH daily and typically had 2-3 beers/day. When asked about his current ETOH use the patient stated \"I quit almost, completely.\" The patient stated since January he has cut down to drinking one beer/week. The patient's last drink was on 8/6/24. The patient stated he was able to cut back on his own and did not do any JOVANY treatment. The patient denied any history of JOVANY treatment. The patient stated he began drinking ETOH at age 14 but never felt his ETOH use was problematic. The patient stated his longest period of sobriety is around one month. The patient denied any blackouts. The patient denied any issues with ETOH withdrawal now or in his past. Last CIWA 2. The patient denied any other substance use. No UDS or BAL collected.     The patient denied any mental " "health diagnosis but is taking Wellbutrin and trazadone.. The patient stated after he was  from his wife 12 years ago he saw a psychiatrist and was started on Wellbutrin. The patient stated he still takes Wellbutrin but now it is prescribed by his PCP and he does not have any outpatient mental health providers. The patient denied any other mental health history.     The patient denied anxiety, depression, SI, wish to be dead, HI, or hallucinations. The patient reported \"ok\" appetite. The patient reported \"not as good' sleep r/t his medical issues.     The patient voiced he plans on completley abstaining from ETOH and believes he will be able to achieve this d/t only drinking one beer/week. The patient denied need for any outpatient JOVANY resources or treatment. The patient denied any need for outpatient mental health resources or any [facility] follow-up visits. [facility] will sign off. Patient encouraged to reach to staff if he would liek to see [facility].   "

## 2024-08-16 NOTE — Clinical Note
Hemostasis started on the right brachial vein. Manual pressure applied to vessel. Manual pressure was held by BR RTR. Manual pressure was held for 5 min. Hemostasis achieved successfully. Closure device additional comment: 4X4 and tegaderm

## 2024-08-16 NOTE — Clinical Note
Hemostasis started on the right radial artery. R-Band was used in achieving hemostasis. Radial compression device applied to vessel. Hemostasis achieved successfully. Closure device additional comment: TR 13 CC

## 2024-08-16 NOTE — PLAN OF CARE
Goal Outcome Evaluation:  Plan of Care Reviewed With: patient, spouse           Outcome Evaluation: S/p HC this morning. TR band removed from R radial. Occlusive dressing placed and it is CDI. Afib with PVC on tele, BP stable, on 2L NC. No c/o pain. CIWA per MD order for ETOH. [facility] also consulted for ETOH. IC diurese. Continue with current POC and update as needed.

## 2024-08-17 LAB
ANION GAP SERPL CALCULATED.3IONS-SCNC: 12.7 MMOL/L (ref 5–15)
BUN SERPL-MCNC: 14 MG/DL (ref 8–23)
BUN/CREAT SERPL: 13.2 (ref 7–25)
CALCIUM SPEC-SCNC: 9.6 MG/DL (ref 8.6–10.5)
CHLORIDE SERPL-SCNC: 100 MMOL/L (ref 98–107)
CHOLEST SERPL-MCNC: 157 MG/DL (ref 0–200)
CO2 SERPL-SCNC: 28.3 MMOL/L (ref 22–29)
CREAT SERPL-MCNC: 1.06 MG/DL (ref 0.76–1.27)
DEPRECATED RDW RBC AUTO: 41.7 FL (ref 37–54)
EGFRCR SERPLBLD CKD-EPI 2021: 74.1 ML/MIN/1.73
ERYTHROCYTE [DISTWIDTH] IN BLOOD BY AUTOMATED COUNT: 12.8 % (ref 12.3–15.4)
GLUCOSE SERPL-MCNC: 81 MG/DL (ref 65–99)
HBA1C MFR BLD: 5.7 % (ref 4.8–5.6)
HCT VFR BLD AUTO: 49.8 % (ref 37.5–51)
HDLC SERPL-MCNC: 46 MG/DL (ref 40–60)
HGB BLD-MCNC: 16.5 G/DL (ref 13–17.7)
LDLC SERPL CALC-MCNC: 93 MG/DL (ref 0–100)
LDLC/HDLC SERPL: 2 {RATIO}
MCH RBC QN AUTO: 29.4 PG (ref 26.6–33)
MCHC RBC AUTO-ENTMCNC: 33.1 G/DL (ref 31.5–35.7)
MCV RBC AUTO: 88.8 FL (ref 79–97)
PLATELET # BLD AUTO: 189 10*3/MM3 (ref 140–450)
PMV BLD AUTO: 11 FL (ref 6–12)
POTASSIUM SERPL-SCNC: 3.4 MMOL/L (ref 3.5–5.2)
QT INTERVAL: 408 MS
QT INTERVAL: 444 MS
QTC INTERVAL: 506 MS
QTC INTERVAL: 511 MS
RBC # BLD AUTO: 5.61 10*6/MM3 (ref 4.14–5.8)
SODIUM SERPL-SCNC: 141 MMOL/L (ref 136–145)
TRIGL SERPL-MCNC: 94 MG/DL (ref 0–150)
VLDLC SERPL-MCNC: 18 MG/DL (ref 5–40)
WBC NRBC COR # BLD AUTO: 8.9 10*3/MM3 (ref 3.4–10.8)

## 2024-08-17 PROCEDURE — 93010 ELECTROCARDIOGRAM REPORT: CPT | Performed by: INTERNAL MEDICINE

## 2024-08-17 PROCEDURE — 85027 COMPLETE CBC AUTOMATED: CPT | Performed by: INTERNAL MEDICINE

## 2024-08-17 PROCEDURE — 80048 BASIC METABOLIC PNL TOTAL CA: CPT | Performed by: INTERNAL MEDICINE

## 2024-08-17 PROCEDURE — 99232 SBSQ HOSP IP/OBS MODERATE 35: CPT | Performed by: INTERNAL MEDICINE

## 2024-08-17 PROCEDURE — 25010000002 THIAMINE HCL 200 MG/2ML SOLUTION: Performed by: INTERNAL MEDICINE

## 2024-08-17 PROCEDURE — 25010000002 FUROSEMIDE PER 20 MG: Performed by: INTERNAL MEDICINE

## 2024-08-17 PROCEDURE — 93005 ELECTROCARDIOGRAM TRACING: CPT | Performed by: INTERNAL MEDICINE

## 2024-08-17 PROCEDURE — 80061 LIPID PANEL: CPT | Performed by: INTERNAL MEDICINE

## 2024-08-17 PROCEDURE — 83036 HEMOGLOBIN GLYCOSYLATED A1C: CPT | Performed by: INTERNAL MEDICINE

## 2024-08-17 RX ADMIN — FOLIC ACID 1 MG: 1 TABLET ORAL at 09:00

## 2024-08-17 RX ADMIN — SACUBITRIL AND VALSARTAN 1 TABLET: 24; 26 TABLET, FILM COATED ORAL at 21:15

## 2024-08-17 RX ADMIN — THIAMINE HYDROCHLORIDE 200 MG: 100 INJECTION, SOLUTION INTRAMUSCULAR; INTRAVENOUS at 07:45

## 2024-08-17 RX ADMIN — FUROSEMIDE 80 MG: 10 INJECTION, SOLUTION INTRAMUSCULAR; INTRAVENOUS at 14:08

## 2024-08-17 RX ADMIN — LORAZEPAM 1 MG: 1 TABLET ORAL at 15:52

## 2024-08-17 RX ADMIN — METHOCARBAMOL TABLETS 750 MG: 750 TABLET, COATED ORAL at 15:52

## 2024-08-17 RX ADMIN — THIAMINE HYDROCHLORIDE 200 MG: 100 INJECTION, SOLUTION INTRAMUSCULAR; INTRAVENOUS at 21:15

## 2024-08-17 RX ADMIN — LORAZEPAM 1 MG: 1 TABLET ORAL at 09:16

## 2024-08-17 RX ADMIN — FUROSEMIDE 80 MG: 10 INJECTION, SOLUTION INTRAMUSCULAR; INTRAVENOUS at 21:15

## 2024-08-17 RX ADMIN — METHOCARBAMOL TABLETS 750 MG: 750 TABLET, COATED ORAL at 21:15

## 2024-08-17 RX ADMIN — BUPROPION HYDROCHLORIDE 300 MG: 300 TABLET, EXTENDED RELEASE ORAL at 09:00

## 2024-08-17 RX ADMIN — METHOCARBAMOL TABLETS 750 MG: 750 TABLET, COATED ORAL at 09:00

## 2024-08-17 RX ADMIN — DIGOXIN 125 MCG: 125 TABLET ORAL at 14:07

## 2024-08-17 RX ADMIN — FLUTICASONE PROPIONATE 1 SPRAY: 50 SPRAY, METERED NASAL at 09:03

## 2024-08-17 RX ADMIN — CARVEDILOL 6.25 MG: 3.12 TABLET, FILM COATED ORAL at 09:00

## 2024-08-17 RX ADMIN — LORAZEPAM 1 MG: 1 TABLET ORAL at 21:14

## 2024-08-17 RX ADMIN — SACUBITRIL AND VALSARTAN 1 TABLET: 24; 26 TABLET, FILM COATED ORAL at 09:00

## 2024-08-17 RX ADMIN — RIVAROXABAN 20 MG: 20 TABLET, FILM COATED ORAL at 09:00

## 2024-08-17 RX ADMIN — CARVEDILOL 6.25 MG: 3.12 TABLET, FILM COATED ORAL at 17:29

## 2024-08-17 RX ADMIN — Medication 1 TABLET: at 09:00

## 2024-08-17 RX ADMIN — THIAMINE HYDROCHLORIDE 200 MG: 100 INJECTION, SOLUTION INTRAMUSCULAR; INTRAVENOUS at 14:08

## 2024-08-17 NOTE — PLAN OF CARE
Goal Outcome Evaluation:  Plan of Care Reviewed With: patient, spouse, son        Progress: no change  Outcome Evaluation: AaOx4. No c/o of pain. CIWA per MD order for ETOH. Rate controlled Afib with PVC on tele. BP stable, weaned to RA. Ambulating with staff assist x1.

## 2024-08-17 NOTE — PAYOR COMM NOTE
"Katrina Morejon (73 y.o. Male)     ATTN: NURSE REVIEWER  RE: INITIAL INPT AUTH CLINICALS   REF#: [id number]  PLS REPLY TO BECKY LINCOLN [phone] OR FAX# [phone]      Date of Birth   1950    Social Security Number       Address   [address] [place] [address] [address] [place] [address]    Home Phone   [phone]    MRN   [id number]       Sikhism   [employer]    Marital Status   Single                            Admission Date   8/16/24    Admission Type   Elective    Admitting Provider   Chava Mcconnell MD    Attending Provider   Chava Mcconnell MD    Department, Room/Bed   [facility], 3114/1       Discharge Date       Discharge Disposition       Discharge Destination                                 Attending Provider: Chava Mcconnell MD    Allergies: Spironolactone    Isolation: None   Infection: None   Code Status: CPR    Ht: 172.7 cm (68\")   Wt: 74.6 kg (164 lb 8 oz)    Admission Cmt: None   Principal Problem: Acute on chronic systolic CHF (congestive heart failure) [I50.23]                   Active Insurance as of 8/16/2024       Primary Coverage       Payor Plan Insurance Group Employer/Plan Group    ANTHEM MEDICARE REPLACEMENT ANTHEM MEDICARE ADVANTAGE [id number]       Payor Plan Address Payor Plan Phone Number Payor Plan Fax Number Effective Dates    [address] [phone]  1/1/2024 - None Entered    [facility] [id number]         Subscriber Name Subscriber Birth Date Member ID       KATRINA MOREJON 1950 [id number]                     Emergency Contacts        (Rel.) Home Phone Work Phone Mobile Phone    Levi Somers (Spouse) [phone] -- [phone]              Facility-Administered Medications as of 8/17/2024   Medication Dose Route Frequency Provider Last Rate Last Admin    acetaminophen (TYLENOL) tablet 650 mg  650 mg Oral Q4H PRN Chava Mcconnell MD        buPROPion XL (WELLBUTRIN XL) 24 hr tablet 300 mg  300 mg " Oral Daily Chava Mcconnell MD   300 mg at 24 0900    carvedilol (COREG) tablet 6.25 mg  6.25 mg Oral BID With Meals Chava Mcconnell MD   6.25 mg at 24 0900    digoxin (LANOXIN) tablet 125 mcg  125 mcg Oral Daily Chava Mcconnell MD   125 mcg at 24 1131    fluticasone (FLONASE) 50 MCG/ACT nasal spray 1 spray  1 spray Nasal Daily Chava Mcconnell MD   1 spray at 24 0903    folic acid (FOLVITE) tablet 1 mg  1 mg Oral Daily Chava Mcconnell MD   1 mg at 24 0900    furosemide (LASIX) injection 80 mg  80 mg Intravenous Q12H Chava Mcconnell MD   80 mg at 24    HYDROcodone-acetaminophen (NORCO) 5-325 MG per tablet 1 tablet  1 tablet Oral Q4H PRN Chava Mcconnell MD        LORazepam (ATIVAN) tablet 1 mg  1 mg Oral Q1H PRN Chava Mcconnell MD        Or    LORazepam (ATIVAN) injection 1 mg  1 mg Intravenous Q1H PRN Chava Mcconnell MD        Or    LORazepam (ATIVAN) tablet 2 mg  2 mg Oral Q1H PRN Chava Mcconnell MD        Or    LORazepam (ATIVAN) injection 2 mg  2 mg Intravenous Q1H PRN Chava Mcconnell MD        Or    LORazepam (ATIVAN) injection 2 mg  2 mg Intravenous Q15 Min PRN Chava Mcconnell MD        Or    LORazepam (ATIVAN) injection 2 mg  2 mg Intramuscular Q15 Min PRN Chava Mcconnell MD        LORazepam (ATIVAN) tablet 1 mg  1 mg Oral Q8H PRN Chava Mcconnell MD        [] LORazepam (ATIVAN) tablet 2 mg  2 mg Oral Q6H Chava Mcconnell MD   2 mg at 24    Followed by    LORazepam (ATIVAN) tablet 1 mg  1 mg Oral Q6H Chava Mcconnell MD   1 mg at 24 0916    Followed by    [START ON 2024] LORazepam (ATIVAN) tablet 1 mg  1 mg Oral Q12H Chava Mcconnell MD        Followed by    [START ON 2024] LORazepam (ATIVAN) tablet 1 mg  1 mg Oral Daily Chava Mcconnell MD        methocarbamol (ROBAXIN) tablet 750 mg  750 mg Oral TID Enedina  Chava OLSON MD   750 mg at 08/17/24 0900    morphine injection 2 mg  2 mg Intravenous Q2H PRN Chava Mcconnell MD        And    naloxone (NARCAN) injection 0.4 mg  0.4 mg Intravenous Q5 Min PRN Chava Mcconnell MD        multivitamin with minerals 1 tablet  1 tablet Oral Daily Chava Mcconnell MD   1 tablet at 08/17/24 0900    nitroglycerin (NITROSTAT) SL tablet 0.4 mg  0.4 mg Sublingual Q5 Min PRN Chava Mcconnell MD        ondansetron ODT (ZOFRAN-ODT) disintegrating tablet 4 mg  4 mg Oral Q6H PRN Chava Mcconnell MD        Or    ondansetron (ZOFRAN) injection 4 mg  4 mg Intravenous Q6H PRN Chava Mcconnell MD   4 mg at 08/16/24 1020    rivaroxaban (XARELTO) tablet 20 mg  20 mg Oral Daily Soren Washington Jr., MD   20 mg at 08/17/24 0900    sacubitril-valsartan (ENTRESTO) 24-26 MG tablet 1 tablet  1 tablet Oral Q12H Chava Mcconnell MD   1 tablet at 08/17/24 0900    sodium chloride 0.9 % infusion  75 mL/hr Intravenous Continuous Chava Mcconnell MD 75 mL/hr at 08/16/24 0828 75 mL/hr at 08/16/24 0828    temazepam (RESTORIL) capsule 15 mg  15 mg Oral Nightly PRN Chava Mcconnell MD        thiamine (B-1) injection 200 mg  200 mg Intravenous Q8H Chava Mcconnell MD   200 mg at 08/17/24 0745    Followed by    [START ON 8/21/2024] thiamine (VITAMIN B-1) tablet 100 mg  100 mg Oral Daily Chava Mcconnell MD        traZODone (DESYREL) tablet 50 mg  50 mg Oral Nightly PRN Chava Mcconnell MD         Lab Results (last 24 hours)       Procedure Component Value Units Date/Time    Lipid Panel [[id number]] Collected: 08/17/24 0442    Specimen: Blood Updated: 08/17/24 0613     Total Cholesterol 157 mg/dL      Triglycerides 94 mg/dL      HDL Cholesterol 46 mg/dL      LDL Cholesterol  93 mg/dL      VLDL Cholesterol 18 mg/dL      LDL/HDL Ratio 2.00    Narrative:      Cholesterol Reference Ranges  (U.S. Department of Health and Human Services ATP III  Classifications)    Desirable          <200 mg/dL  Borderline High    200-239 mg/dL  High Risk          >240 mg/dL      Triglyceride Reference Ranges  (U.S. Department of Health and Human Services ATP III Classifications)    Normal           <150 mg/dL  Borderline High  150-199 mg/dL  High             200-499 mg/dL  Very High        >500 mg/dL    HDL Reference Ranges  (U.S. Department of Health and Human Services ATP III Classifications)    Low     <40 mg/dl (major risk factor for CHD)  High    >60 mg/dl ('negative' risk factor for CHD)        LDL Reference Ranges  (U.S. Department of Health and Human Services ATP III Classifications)    Optimal          <100 mg/dL  Near Optimal     100-129 mg/dL  Borderline High  130-159 mg/dL  High             160-189 mg/dL  Very High        >189 mg/dL    Basic Metabolic Panel [[id number]]  (Abnormal) Collected: 08/17/24 0442    Specimen: Blood Updated: 08/17/24 0613     Glucose 81 mg/dL      BUN 14 mg/dL      Creatinine 1.06 mg/dL      Sodium 141 mmol/L      Potassium 3.4 mmol/L      Chloride 100 mmol/L      CO2 28.3 mmol/L      Calcium 9.6 mg/dL      BUN/Creatinine Ratio 13.2     Anion Gap 12.7 mmol/L      eGFR 74.1 mL/min/1.73     Narrative:      GFR Normal >60  Chronic Kidney Disease <60  Kidney Failure <15    The GFR formula is only valid for adults with stable renal function between ages 18 and 70.    Hemoglobin A1c [[id number]]  (Abnormal) Collected: 08/17/24 0442    Specimen: Blood Updated: 08/17/24 0603     Hemoglobin A1C 5.70 %     Narrative:      Hemoglobin A1C Ranges:    Increased Risk for Diabetes  5.7% to 6.4%  Diabetes                     >= 6.5%  Diabetic Goal                < 7.0%    CBC (No Diff) [[id number]]  (Normal) Collected: 08/17/24 0442    Specimen: Blood Updated: 08/17/24 0552     WBC 8.90 10*3/mm3      RBC 5.61 10*6/mm3      Hemoglobin 16.5 g/dL      Hematocrit 49.8 %      MCV 88.8 fL      MCH 29.4 pg      MCHC 33.1 g/dL      RDW 12.8 %      RDW-SD 41.7 fl   "    MPV 11.0 fL      Platelets 189 10*3/mm3           Imaging Results (Last 24 Hours)       ** No results found for the last 24 hours. **          ECG/EMG Results (last 24 hours)       Procedure Component Value Units Date/Time    Telemetry Scan [[id number]] Resulted: 08/16/24     Updated: 08/16/24 1805    ECG 12 Lead Chest Pain [[id number]] Collected: 08/17/24 0403     Updated: 08/17/24 0409     QT Interval 444 ms      QTC Interval 506 ms     Narrative:      HEART RATE=78  bpm  RR Htmyenhw=226  ms  KS Interval=  ms  P Horizontal Axis=  deg  P Front Axis=  deg  QRSD Likumuek=547  ms  QT Ocrxpsgo=017  ms  XNnZ=951  ms  QRS Axis=124  deg  T Wave Axis=-23  deg  - ABNORMAL ECG -  Atrial fibrillation  Right bundle branch block  ST depr, consider ischemia, anterolateral lds  Date and Time of Study:2024-08-17 04:03:54          Orders (last 24 hrs)        Start     Ordered    08/21/24 1400  thiamine (VITAMIN B-1) tablet 100 mg  Daily        Placed in \"Followed by\" Linked Group    08/16/24 0928    08/19/24 0900  LORazepam (ATIVAN) tablet 1 mg  Daily        Placed in \"Followed by\" Linked Group    08/16/24 0928    08/18/24 0930  LORazepam (ATIVAN) tablet 1 mg  Every 12 Hours Scheduled        Placed in \"Followed by\" Linked Group    08/16/24 0928    08/17/24 0930  LORazepam (ATIVAN) tablet 1 mg  Every 6 Hours        Placed in \"Followed by\" Linked Group    08/16/24 0928    08/17/24 0900  fluticasone (FLONASE) 50 MCG/ACT nasal spray 1 spray  Daily         08/16/24 0928 08/17/24 0900  rivaroxaban (XARELTO) tablet 20 mg  Daily         08/17/24 0731    08/17/24 0700  ECG 12 Lead Chest Pain  Once         08/16/24 0928 08/17/24 0600  CBC (No Diff)  Morning Draw         08/16/24 0928 08/17/24 0600  Basic Metabolic Panel  Morning Draw         08/16/24 0928 08/17/24 0600  Hemoglobin A1c  Morning Draw         08/16/24 0928 08/17/24 0600  Lipid Panel  Morning Draw        Comments: Fasting      08/16/24 0928    08/16/24 1400  " "thiamine (B-1) injection 200 mg  Every 8 Hours Scheduled        Placed in \"Followed by\" Linked Group    08/16/24 0928 08/16/24 1200  Strict intake and output  Every 4 Hours       08/16/24 0928 08/16/24 1200  digoxin (LANOXIN) tablet 125 mcg  Daily Digoxin         08/16/24 0928 08/16/24 1100  buPROPion XL (WELLBUTRIN XL) 24 hr tablet 300 mg  Daily         08/16/24 0928 08/16/24 1100  methocarbamol (ROBAXIN) tablet 750 mg  3 Times Daily         08/16/24 0928 08/16/24 1100  carvedilol (COREG) tablet 6.25 mg  2 Times Daily With Meals         08/16/24 0928 08/16/24 1100  multivitamin with minerals 1 tablet  Daily         08/16/24 0928 08/16/24 1100  sacubitril-valsartan (ENTRESTO) 24-26 MG tablet 1 tablet  Every 12 Hours Scheduled         08/16/24 0928 08/16/24 1100  furosemide (LASIX) injection 80 mg  Every 12 Hours         08/16/24 0928 08/16/24 1000  Incentive Spirometry  Every 2 Hours While Awake       08/16/24 0928 08/16/24 0930  folic acid (FOLVITE) tablet 1 mg  Daily         08/16/24 0928 08/16/24 0929  LORazepam (ATIVAN) tablet 2 mg  Every 6 Hours        Placed in \"Followed by\" Linked Group    08/16/24 0928 08/16/24 0927  LORazepam (ATIVAN) tablet 1 mg  Every 1 Hour PRN        Placed in \"Or\" Linked Group    08/16/24 0928    08/16/24 0927  LORazepam (ATIVAN) injection 1 mg  Every 1 Hour PRN        Placed in \"Or\" Linked Group    08/16/24 0928    08/16/24 0927  LORazepam (ATIVAN) tablet 2 mg  Every 1 Hour PRN        Placed in \"Or\" Linked Group    08/16/24 0928 08/16/24 0927  LORazepam (ATIVAN) injection 2 mg  Every 1 Hour PRN        Placed in \"Or\" Linked Group    08/16/24 0928    08/16/24 0927  LORazepam (ATIVAN) injection 2 mg  Every 15 Minutes PRN        Placed in \"Or\" Linked Group    08/16/24 0928 08/16/24 0927  LORazepam (ATIVAN) injection 2 mg  Every 15 Minutes PRN        Placed in \"Or\" Linked Group    08/16/24 0928 08/16/24 0926  traZODone (DESYREL) tablet 50 mg  " "Nightly PRN         24 0928    24 09  LORazepam (ATIVAN) tablet 1 mg  Every 8 Hours PRN         24 0928    24 09  morphine injection 2 mg  Every 2 Hours PRN        Placed in \"And\" Linked Group    2428    24  acetaminophen (TYLENOL) tablet 650 mg  Every 4 Hours PRN         2428    24  HYDROcodone-acetaminophen (NORCO) 5-325 MG per tablet 1 tablet  Every 4 Hours PRN         2428    24 09  naloxone (NARCAN) injection 0.4 mg  Every 5 Minutes PRN        Placed in \"And\" Linked Group    2428    24  temazepam (RESTORIL) capsule 15 mg  Nightly PRN         24  ondansetron ODT (ZOFRAN-ODT) disintegrating tablet 4 mg  Every 6 Hours PRN        Placed in \"Or\" Linked Group    2428    24  ondansetron (ZOFRAN) injection 4 mg  Every 6 Hours PRN        Placed in \"Or\" Linked Group    2428    24  nitroglycerin (NITROSTAT) SL tablet 0.4 mg  Every 5 Minutes PRN         24 0915  sodium chloride 0.9 % infusion  Continuous         24 08    Unscheduled  Vital Signs  As Needed       24 09    Unscheduled  Change site dressing  As Needed       24 09    Unscheduled  Obtain Pre & Post Sedation Scores With Every Sedative Dose - Hold For POSS Greater Than 2 or RASS of -3 or Less  As Needed       24 0928    --  LORazepam (ATIVAN) 1 MG tablet  Every 8 Hours PRN         24 0759                  Operative/Procedure Notes (last 24 hours)  Notes from 24 1330 through 24 1330   No notes of this type exist for this encounter.          Physician Progress Notes (last 24 hours)        Soren Washington Jr., MD at 24 0929                [facility]    Patient Name: Jered Morejon  :1950  73 y.o.  LOS: 1  Encounter Provider: Soren Washington Jr, MD      Patient Care Team:  Yuliana Raymundo APRN as " "PCP - General (Nurse Practitioner)    Chief Complaint: Follow-up acute on chronic systolic heart failure, persistent atrial fibrillation, history of alcohol abuse    Interval History: Results of left and right heart catheterization noted.  Severely depressed cardiac output with very high pulmonary pressures consistent with pulmonary venous hypertension from heart failure.  Diuresed well overnight.  He is mildly confused this morning which is concerning for alcohol withdrawal despite his wife telling me that his last drink was a week and a half ago and that he is only been drinking a few beverages here and there.  He denies chest pain or shortness of air.  Holter monitor was reviewed which showed rate controlled atrial fibrillation.  Hemodynamically stable and tolerating current medications.       Objective   Vital Signs  Temp:  [97.4 °F (36.3 °C)-98.3 °F (36.8 °C)] 98.3 °F (36.8 °C)  Heart Rate:  [75-98] 78  Resp:  [16] 16  BP: (109-156)/() 120/83    Intake/Output Summary (Last 24 hours) at 8/17/2024 0930  Last data filed at 8/17/2024 0258  Gross per 24 hour   Intake 510 ml   Output 3800 ml   Net -3290 ml     Flowsheet Rows      Flowsheet Row First Filed Value   Admission Height 172.7 cm (68\") Documented at 08/16/2024 0816   Admission Weight 74.6 kg (164 lb 8 oz) Documented at 08/16/2024 0816              Vitals reviewed.   Constitutional:       Appearance: Not in distress. Acutely ill-appearing.   Neck:      Vascular: No JVR. JVD normal.   Pulmonary:      Effort: Pulmonary effort is normal.      Breath sounds: No wheezing. No rhonchi. Bibasilar Rales present.   Chest:      Chest wall: Not tender to palpatation.   Cardiovascular:      PMI at left midclavicular line. Normal rate. Irregularly irregular rhythm. Normal S1. Normal S2.       Murmurs: There is no murmur.      No gallop.  No click. No rub.   Pulses:     Intact distal pulses.   Edema:     Peripheral edema absent.   Abdominal:      General: Bowel sounds " are normal.      Palpations: Abdomen is soft.      Tenderness: There is no abdominal tenderness.   Musculoskeletal: Normal range of motion.         General: No tenderness. Skin:     General: Skin is warm and dry.   Neurological:      General: No focal deficit present.           Pertinent Test Results:  Results from last 7 days   Lab Units 08/17/24  0442   SODIUM mmol/L 141   POTASSIUM mmol/L 3.4*   CHLORIDE mmol/L 100   CO2 mmol/L 28.3   BUN mg/dL 14   CREATININE mg/dL 1.06   GLUCOSE mg/dL 81   CALCIUM mg/dL 9.6         Results from last 7 days   Lab Units 08/17/24  0442   WBC 10*3/mm3 8.90   HEMOGLOBIN g/dL 16.5   HEMATOCRIT % 49.8   PLATELETS 10*3/mm3 189             Results from last 7 days   Lab Units 08/17/24  0442   CHOLESTEROL mg/dL 157   TRIGLYCERIDES mg/dL 94   HDL CHOL mg/dL 46                   Medication Review:   buPROPion XL, 300 mg, Oral, Daily  carvedilol, 6.25 mg, Oral, BID With Meals  digoxin, 125 mcg, Oral, Daily  fluticasone, 1 spray, Nasal, Daily  folic acid, 1 mg, Oral, Daily  furosemide, 80 mg, Intravenous, Q12H  LORazepam, 1 mg, Oral, Q6H   Followed by  [START ON 8/18/2024] LORazepam, 1 mg, Oral, Q12H   Followed by  [START ON 8/19/2024] LORazepam, 1 mg, Oral, Daily  methocarbamol, 750 mg, Oral, TID  multivitamin with minerals, 1 tablet, Oral, Daily  rivaroxaban, 20 mg, Oral, Daily  sacubitril-valsartan, 1 tablet, Oral, Q12H  thiamine (B-1) IV, 200 mg, Intravenous, Q8H   Followed by  [START ON 8/21/2024] thiamine, 100 mg, Oral, Daily         sodium chloride, 75 mL/hr, Last Rate: 75 mL/hr (08/16/24 0828)        Assessment & Plan     Active Hospital Problems    Diagnosis  POA    **Acute on chronic systolic CHF (congestive heart failure) [I50.23]  Unknown     Priority: High    CHF (congestive heart failure), NYHA class III, acute, systolic [I50.21]  Yes      Resolved Hospital Problems   No resolved problems to display.        Decompensated heart failure -chronic LV systolic dysfunction.  He has a  history of heart failure with recovered ejection fraction in the past and known history of alcohol abuse.  He saw me in July 2023 with known history of chronic atrial fibrillation.  He was euvolemic at that time and counseled on need for cessation of alcohol use.  He came back in January 2024 with increased fatigue and dyspnea on exertion.  I sent him from clinic to the [facility] emergency room. He was sent home from that visit on antibiotics after a dose of diuretics.  He showed back up in the hospital with decompensated heart failure a week later. echocardiogram was ordered showing EF of 32%.  He was started on Entresto and carvedilol as well as spironolactone.  He could not tolerate spironolactone due to symptomatic hypotension which is why we did not add an SGLT2 inhibitor.  He has progressive heart failure symptoms and was brought to the hospital for left and right heart catheterization yesterday.  Cardiac index of 1.2 L/min/m² with right atrial pressure of 20, mean pulmonary artery pressure of 55 and a diastolic PA of 34 correlating with a mean wedge of 35.  Pulmonary venous hypertension secondary to systolic heart failure.  He diuresed well last night.  Very concerning situation.  I have low suspicion that chronic rate controlled atrial fibrillation is causing his heart failure.  I am more concerned about alcohol toxicity.  Long discussion with family about alcohol cessation.  We will continue sacubitril valsartan and carvedilol.  Heart rate and blood pressure controlled.  Continue IV diuretics today.  I would like to avoid inotropic therapy.  Chronic atrial fibrillation -restart rivaroxaban.  Continue carvedilol.  History of alcohol abuse           Soren Washington Jr, MD  [facility]   [facility]  [address] [place] [address] [address] [place] [address]  Office: [phone]    08/17/24  09:30 EDT            Electronically signed by Soren Washington Jr., MD at 08/17/24 6977        Consult Notes (last 24 hours)  Notes from 08/16/24 1330 through 08/17/24 1330   No notes of this type exist for this encounter.

## 2024-08-17 NOTE — PROGRESS NOTES
"[facility]    Patient Name: Jered Morejon  :1950  73 y.o.  LOS: 1  Encounter Provider: Soren Washington Jr, MD      Patient Care Team:  Yuliana Raymundo APRN as PCP - General (Nurse Practitioner)    Chief Complaint: Follow-up acute on chronic systolic heart failure, persistent atrial fibrillation, history of alcohol abuse    Interval History: Results of left and right heart catheterization noted.  Severely depressed cardiac output with very high pulmonary pressures consistent with pulmonary venous hypertension from heart failure.  Diuresed well overnight.  He is mildly confused this morning which is concerning for alcohol withdrawal despite his wife telling me that his last drink was a week and a half ago and that he is only been drinking a few beverages here and there.  He denies chest pain or shortness of air.  Holter monitor was reviewed which showed rate controlled atrial fibrillation.  Hemodynamically stable and tolerating current medications.       Objective   Vital Signs  Temp:  [97.4 °F (36.3 °C)-98.3 °F (36.8 °C)] 98.3 °F (36.8 °C)  Heart Rate:  [75-98] 78  Resp:  [16] 16  BP: (109-156)/() 120/83    Intake/Output Summary (Last 24 hours) at 2024 0930  Last data filed at 2024 0258  Gross per 24 hour   Intake 510 ml   Output 3800 ml   Net -3290 ml     Flowsheet Rows      Flowsheet Row First Filed Value   Admission Height 172.7 cm (68\") Documented at 2024 0816   Admission Weight 74.6 kg (164 lb 8 oz) Documented at 2024 0816              Vitals reviewed.   Constitutional:       Appearance: Not in distress. Acutely ill-appearing.   Neck:      Vascular: No JVR. JVD normal.   Pulmonary:      Effort: Pulmonary effort is normal.      Breath sounds: No wheezing. No rhonchi. Bibasilar Rales present.   Chest:      Chest wall: Not tender to palpatation.   Cardiovascular:      PMI at left midclavicular line. Normal rate. Irregularly irregular rhythm. Normal S1. " Normal S2.       Murmurs: There is no murmur.      No gallop.  No click. No rub.   Pulses:     Intact distal pulses.   Edema:     Peripheral edema absent.   Abdominal:      General: Bowel sounds are normal.      Palpations: Abdomen is soft.      Tenderness: There is no abdominal tenderness.   Musculoskeletal: Normal range of motion.         General: No tenderness. Skin:     General: Skin is warm and dry.   Neurological:      General: No focal deficit present.           Pertinent Test Results:  Results from last 7 days   Lab Units 08/17/24  0442   SODIUM mmol/L 141   POTASSIUM mmol/L 3.4*   CHLORIDE mmol/L 100   CO2 mmol/L 28.3   BUN mg/dL 14   CREATININE mg/dL 1.06   GLUCOSE mg/dL 81   CALCIUM mg/dL 9.6         Results from last 7 days   Lab Units 08/17/24  0442   WBC 10*3/mm3 8.90   HEMOGLOBIN g/dL 16.5   HEMATOCRIT % 49.8   PLATELETS 10*3/mm3 189             Results from last 7 days   Lab Units 08/17/24  0442   CHOLESTEROL mg/dL 157   TRIGLYCERIDES mg/dL 94   HDL CHOL mg/dL 46                   Medication Review:   buPROPion XL, 300 mg, Oral, Daily  carvedilol, 6.25 mg, Oral, BID With Meals  digoxin, 125 mcg, Oral, Daily  fluticasone, 1 spray, Nasal, Daily  folic acid, 1 mg, Oral, Daily  furosemide, 80 mg, Intravenous, Q12H  LORazepam, 1 mg, Oral, Q6H   Followed by  [START ON 8/18/2024] LORazepam, 1 mg, Oral, Q12H   Followed by  [START ON 8/19/2024] LORazepam, 1 mg, Oral, Daily  methocarbamol, 750 mg, Oral, TID  multivitamin with minerals, 1 tablet, Oral, Daily  rivaroxaban, 20 mg, Oral, Daily  sacubitril-valsartan, 1 tablet, Oral, Q12H  thiamine (B-1) IV, 200 mg, Intravenous, Q8H   Followed by  [START ON 8/21/2024] thiamine, 100 mg, Oral, Daily         sodium chloride, 75 mL/hr, Last Rate: 75 mL/hr (08/16/24 0828)        Assessment & Plan     Active Hospital Problems    Diagnosis  POA    **Acute on chronic systolic CHF (congestive heart failure) [I50.23]  Unknown     Priority: High    CHF (congestive heart  failure), NYHA class III, acute, systolic [I50.21]  Yes      Resolved Hospital Problems   No resolved problems to display.        Decompensated heart failure -chronic LV systolic dysfunction.  He has a history of heart failure with recovered ejection fraction in the past and known history of alcohol abuse.  He saw me in July 2023 with known history of chronic atrial fibrillation.  He was euvolemic at that time and counseled on need for cessation of alcohol use.  He came back in January 2024 with increased fatigue and dyspnea on exertion.  I sent him from clinic to the [facility] emergency room. He was sent home from that visit on antibiotics after a dose of diuretics.  He showed back up in the hospital with decompensated heart failure a week later. echocardiogram was ordered showing EF of 32%.  He was started on Entresto and carvedilol as well as spironolactone.  He could not tolerate spironolactone due to symptomatic hypotension which is why we did not add an SGLT2 inhibitor.  He has progressive heart failure symptoms and was brought to the hospital for left and right heart catheterization yesterday.  Cardiac index of 1.2 L/min/m² with right atrial pressure of 20, mean pulmonary artery pressure of 55 and a diastolic PA of 34 correlating with a mean wedge of 35.  Pulmonary venous hypertension secondary to systolic heart failure.  He diuresed well last night.  Very concerning situation.  I have low suspicion that chronic rate controlled atrial fibrillation is causing his heart failure.  I am more concerned about alcohol toxicity.  Long discussion with family about alcohol cessation.  We will continue sacubitril valsartan and carvedilol.  Heart rate and blood pressure controlled.  Continue IV diuretics today.  I would like to avoid inotropic therapy.  Chronic atrial fibrillation -restart rivaroxaban.  Continue carvedilol.  History of alcohol abuse           Soren Washington Jr, MD  [facility]    [facility]  [address]  [place] [address]  Office: [phone]    08/17/24  09:30 EDT

## 2024-08-17 NOTE — PLAN OF CARE
Goal Outcome Evaluation:         Patient denies pain nor discomfort, Vital signs stable, pt resting at this time CIWA score 0,pt forgetful at time, wife at bed side, right wrist dry and intact,  educate to call for assist, call light intact cont to monitor

## 2024-08-18 PROCEDURE — 99232 SBSQ HOSP IP/OBS MODERATE 35: CPT | Performed by: INTERNAL MEDICINE

## 2024-08-18 PROCEDURE — 97162 PT EVAL MOD COMPLEX 30 MIN: CPT

## 2024-08-18 PROCEDURE — 25010000002 THIAMINE HCL 200 MG/2ML SOLUTION: Performed by: INTERNAL MEDICINE

## 2024-08-18 PROCEDURE — 97110 THERAPEUTIC EXERCISES: CPT

## 2024-08-18 RX ORDER — BUMETANIDE 2 MG/1
2 TABLET ORAL
Status: DISCONTINUED | OUTPATIENT
Start: 2024-08-18 | End: 2024-08-19 | Stop reason: HOSPADM

## 2024-08-18 RX ADMIN — LORAZEPAM 1 MG: 1 TABLET ORAL at 03:47

## 2024-08-18 RX ADMIN — BUMETANIDE 2 MG: 2 TABLET ORAL at 10:36

## 2024-08-18 RX ADMIN — METHOCARBAMOL TABLETS 750 MG: 750 TABLET, COATED ORAL at 15:09

## 2024-08-18 RX ADMIN — BUMETANIDE 2 MG: 2 TABLET ORAL at 17:51

## 2024-08-18 RX ADMIN — SACUBITRIL AND VALSARTAN 1 TABLET: 24; 26 TABLET, FILM COATED ORAL at 08:36

## 2024-08-18 RX ADMIN — METHOCARBAMOL TABLETS 750 MG: 750 TABLET, COATED ORAL at 08:36

## 2024-08-18 RX ADMIN — THIAMINE HYDROCHLORIDE 200 MG: 100 INJECTION, SOLUTION INTRAMUSCULAR; INTRAVENOUS at 20:48

## 2024-08-18 RX ADMIN — FOLIC ACID 1 MG: 1 TABLET ORAL at 08:36

## 2024-08-18 RX ADMIN — METHOCARBAMOL TABLETS 750 MG: 750 TABLET, COATED ORAL at 20:47

## 2024-08-18 RX ADMIN — CARVEDILOL 6.25 MG: 3.12 TABLET, FILM COATED ORAL at 08:36

## 2024-08-18 RX ADMIN — Medication 1 TABLET: at 08:36

## 2024-08-18 RX ADMIN — RIVAROXABAN 20 MG: 20 TABLET, FILM COATED ORAL at 08:36

## 2024-08-18 RX ADMIN — DIGOXIN 125 MCG: 125 TABLET ORAL at 15:09

## 2024-08-18 RX ADMIN — BUPROPION HYDROCHLORIDE 300 MG: 300 TABLET, EXTENDED RELEASE ORAL at 08:36

## 2024-08-18 RX ADMIN — CARVEDILOL 6.25 MG: 3.12 TABLET, FILM COATED ORAL at 17:51

## 2024-08-18 RX ADMIN — THIAMINE HYDROCHLORIDE 200 MG: 100 INJECTION, SOLUTION INTRAMUSCULAR; INTRAVENOUS at 15:10

## 2024-08-18 RX ADMIN — THIAMINE HYDROCHLORIDE 200 MG: 100 INJECTION, SOLUTION INTRAMUSCULAR; INTRAVENOUS at 06:11

## 2024-08-18 RX ADMIN — LORAZEPAM 1 MG: 1 TABLET ORAL at 20:47

## 2024-08-18 RX ADMIN — SACUBITRIL AND VALSARTAN 1 TABLET: 24; 26 TABLET, FILM COATED ORAL at 20:47

## 2024-08-18 RX ADMIN — FLUTICASONE PROPIONATE 1 SPRAY: 50 SPRAY, METERED NASAL at 08:36

## 2024-08-18 RX ADMIN — LORAZEPAM 1 MG: 1 TABLET ORAL at 08:36

## 2024-08-18 NOTE — PLAN OF CARE
Goal Outcome Evaluation:           Progress: improving  Outcome Evaluation: Rate conrolled atrial fib on monitor, room air, blood pressures stable. PO bumex started. Ambulating with assist x1 of staff. No c/o pain or shortness of air, no noted edema. No further complaints.

## 2024-08-18 NOTE — THERAPY EVALUATION
Patient Name: Jered Morejon  : 1950    MRN: [id number]                              Today's Date: 2024       Admit Date: 2024    Visit Dx:     ICD-10-CM ICD-9-CM   1. Acute on chronic systolic CHF (congestive heart failure)  I50.23 428.23     428.0     Patient Active Problem List   Diagnosis    Atrial fibrillation with RVR    Pulmonary emphysema    Essential hypertension    Leukocytosis    Alcohol use    Longstanding persistent atrial fibrillation    Acute systolic (congestive) heart failure    Cardiomyopathy, dilated    Acute on chronic systolic CHF (congestive heart failure)    CHF (congestive heart failure), NYHA class III, acute, systolic     Past Medical History:   Diagnosis Date    Atrial fibrillation     COPD (chronic obstructive pulmonary disease)     Hypertension     RBBB     Renal infarct      Past Surgical History:   Procedure Laterality Date    CARDIAC CATHETERIZATION      CARDIOVERSION        General Information       Row Name 24 1026          Physical Therapy Time and Intention    Document Type evaluation  -CS     Mode of Treatment physical therapy  -CS       Row Name 24 1026          General Information    Patient Profile Reviewed yes  -CS     Prior Level of Function independent:  -CS     Existing Precautions/Restrictions fall  -CS       Row Name 24 1026          Living Environment    People in Home spouse  -CS       Row Name 24 1026          Cognition    Orientation Status (Cognition) oriented x 3  -CS       Row Name 24 1026          Safety Issues, Functional Mobility    Impairments Affecting Function (Mobility) endurance/activity tolerance;motor control;strength;balance  -CS               User Key  (r) = Recorded By, (t) = Taken By, (c) = Cosigned By      Initials Name Provider Type    Shyam Sandoval, PT Physical Therapist                   Mobility       Row Name 24 1027          Bed Mobility    Bed Mobility sit-supine  -CS     Sit-Supine  [place] (Bed Mobility) modified independence  -CS     Assistive Device (Bed Mobility) head of bed elevated;bed rails  -       Row Name 08/18/24 1027          Sit-Stand Transfer    Sit-Stand [place] (Transfers) minimum assist (75% patient effort)  -       Row Name 08/18/24 1027          Gait/Stairs (Locomotion)    [place] Level (Gait) minimum assist (75% patient effort)  -CS     Distance in Feet (Gait) 30  -CS     Deviations/Abnormal Patterns (Gait) ataxic;gait speed decreased;base of support, wide  -CS               User Key  (r) = Recorded By, (t) = Taken By, (c) = Cosigned By      Initials Name Provider Type    Shyam Sandoval, PT Physical Therapist                   Obj/Interventions       [facility] Name 08/18/24 1027          Range of Motion Comprehensive    General Range of Motion no range of motion deficits identified  -CS       Row Name 08/18/24 1027          Strength Comprehensive (MMT)    General Manual Muscle Testing (MMT) Assessment no strength deficits identified  -CS               User Key  (r) = Recorded By, (t) = Taken By, (c) = Cosigned By      Initials Name Provider Type    Shyam Sandoval, PT Physical Therapist                   Goals/Plan       [facility] Name 08/18/24 1028          Bed Mobility Goal 1 (PT)    Activity/Assistive Device (Bed Mobility Goal 1, PT) bed mobility activities, all  -CS     [place] Level/Cues Needed (Bed Mobility Goal 1, PT) modified independence  -CS     Time Frame (Bed Mobility Goal 1, PT) 1 week  -[facility] Name 08/18/24 1028          Transfer Goal 1 (PT)    Activity/Assistive Device (Transfer Goal 1, PT) sit-to-stand/stand-to-sit;bed-to-chair/chair-to-bed  -CS     [place] Level/Cues Needed (Transfer Goal 1, PT) modified independence  -CS     Time Frame (Transfer Goal 1, PT) 1 week  -[facility] Name 08/18/24 1028          Gait Training Goal 1 (PT)    Activity/Assistive Device (Gait Training Goal 1, PT) assistive device use  -CS      [place] Level (Gait Training Goal 1, PT) modified independence  -CS     Distance (Gait Training Goal 1, PT) 150  -CS     Time Frame (Gait Training Goal 1, PT) 1 week  -CS       Row Name 08/18/24 1028          Therapy Assessment/Plan (PT)    Planned Therapy Interventions (PT) balance training;gait training;home exercise program;manual therapy techniques;neuromuscular re-education;transfer training;stretching;strengthening;stair training;ROM (range of motion);bed mobility training;patient/family education  -CS               User Key  (r) = Recorded By, (t) = Taken By, (c) = Cosigned By      Initials Name Provider Type    Shyam Sandoval, PT Physical Therapist                   Clinical Impression       Row Name 08/18/24 1027          Pain    Pretreatment Pain Rating 0/10 - no pain  -CS     Posttreatment Pain Rating 0/10 - no pain  -CS       Row Name 08/18/24 1027          Plan of Care Review    Plan of Care Reviewed With patient  -CS       Row Name 08/18/24 1027          Therapy Assessment/Plan (PT)    Patient/Family Therapy Goals Statement (PT) home  -CS     Rehab Potential (PT) good, to achieve stated therapy goals  -CS     Criteria for Skilled Interventions Met (PT) yes  -CS     Therapy Frequency (PT) 5 times/wk  -CS               User Key  (r) = Recorded By, (t) = Taken By, (c) = Cosigned By      Initials Name Provider Type    Shyam Sandoval, PT Physical Therapist                   Outcome Measures       Row Name 08/18/24 1028          How much help from another person do you currently need...    Turning from your back to your side while in flat bed without using bedrails? 4  -CS     Moving from lying on back to sitting on the side of a flat bed without bedrails? 4  -CS     Moving to and from a bed to a chair (including a wheelchair)? 3  -CS     Standing up from a chair using your arms (e.g., wheelchair, bedside chair)? 3  -CS     Climbing 3-5 steps with a railing? 3  -CS     To walk in hospital  room? 3  -CS     AM-PAC 6 Clicks Score (PT) 20  -     Highest Level of Mobility Goal 6 --> Walk 10 steps or more  -       Row Name 08/18/24 1028          Functional Assessment    Outcome Measure Options AM-PAC 6 Clicks Basic Mobility (PT)  -               User Key  (r) = Recorded By, (t) = Taken By, (c) = Cosigned By      Initials Name Provider Type    CS Shyam Gates, PT Physical Therapist                                 Physical Therapy Education       Title: PT OT SLP Therapies (Done)       Topic: Physical Therapy (Done)       Point: Mobility training (Done)       Learning Progress Summary             Patient Acceptance, E,TB, VU,NR by  at 8/18/2024 1028                         Point: Home exercise program (Done)       Learning Progress Summary             Patient Acceptance, E,TB, VU,NR by  at 8/18/2024 1028                         Point: Body mechanics (Done)       Learning Progress Summary             Patient Acceptance, E,TB, VU,NR by  at 8/18/2024 1028                         Point: Precautions (Done)       Learning Progress Summary             Patient Acceptance, E,TB, VU,NR by  at 8/18/2024 1028                                         User Key       Initials Effective Dates Name Provider Type Discipline     07/11/23 -  Shyam Gates, PT Physical Therapist PT                  PT Recommendation and Plan  Planned Therapy Interventions (PT): balance training, gait training, home exercise program, manual therapy techniques, neuromuscular re-education, transfer training, stretching, strengthening, stair training, ROM (range of motion), bed mobility training, patient/family education  Plan of Care Reviewed With: patient     Time Calculation:         PT Charges       Row Name 08/18/24 1030             Time Calculation    Start Time 1010  -      Stop Time 1025  -      Time Calculation (min) 15 min  -      PT Received On 08/18/24  -      PT - Next Appointment 08/19/24  -      PT Goal  Re-Cert Due Date 08/25/24  -JARETH                User Key  (r) = Recorded By, (t) = Taken By, (c) = Cosigned By      Initials Name Provider Type    Shyam Sandoval, PT Physical Therapist                  Therapy Charges for Today       Code Description Service Date Service Provider Modifiers Qty    20499051923 HC PT EVAL MOD COMPLEXITY 2 8/18/2024 Shyam Gates, PT GP 1    80645340772 HC PT THER PROC EA 15 MIN 8/18/2024 Shyam Gates, PT GP 1            PT G-Codes  Outcome Measure Options: AM-PAC 6 Clicks Basic Mobility (PT)  AM-PAC 6 Clicks Score (PT): 20  PT Discharge Summary  Anticipated Discharge Disposition (PT): home with assist, home with home health, home with outpatient therapy services    Shyam Gates, PT  8/18/2024

## 2024-08-18 NOTE — PROGRESS NOTES
"[facility]    Patient Name: Jered Morejon  :1950  73 y.o.  LOS: 2  Encounter Provider: Soren Washington Jr, MD      Patient Care Team:  Yuliana Raymundo APRN as PCP - General (Nurse Practitioner)    Chief Complaint: Follow-up acute on chronic systolic heart failure, persistent atrial fibrillation, history of alcohol abuse    Interval History: No acute issues overnight.  Weight is down 9 pounds from admission and 16 pounds from previous documentation earlier this year.       Objective   Vital Signs  Temp:  [97.1 °F (36.2 °C)-97.8 °F (36.6 °C)] 97.7 °F (36.5 °C)  Heart Rate:  [71-91] 86  Resp:  [16] 16  BP: (103-116)/(65-82) 116/74    Intake/Output Summary (Last 24 hours) at 2024 0826  Last data filed at 2024 0600  Gross per 24 hour   Intake 720 ml   Output 1400 ml   Net -680 ml     Flowsheet Rows      Flowsheet Row First Filed Value   Admission Height 172.7 cm (68\") Documented at 2024 0816   Admission Weight 74.6 kg (164 lb 8 oz) Documented at 2024 0816              Physical Exam  Vitals reviewed.   Constitutional:       Appearance: Not in distress. Acutely ill-appearing.   Neck:      Vascular: No JVR. JVD normal.   Pulmonary:      Effort: Pulmonary effort is normal.      Breath sounds: No wheezing. No rhonchi. Bibasilar Rales present.   Chest:      Chest wall: Not tender to palpatation.   Cardiovascular:      PMI at left midclavicular line. Normal rate. Irregularly irregular rhythm. Normal S1. Normal S2.       Murmurs: There is no murmur.      No gallop.  No click. No rub.   Pulses:     Intact distal pulses.   Edema:     Peripheral edema absent.   Abdominal:      General: Bowel sounds are normal.      Palpations: Abdomen is soft.      Tenderness: There is no abdominal tenderness.   Musculoskeletal: Normal range of motion.         General: No tenderness. Skin:     General: Skin is warm and dry.   Neurological:      General: No focal deficit present.     Physical " exam was reviewed, updated and amended when necessary.    Pertinent Test Results:  Results from last 7 days   Lab Units 08/17/24  0442   SODIUM mmol/L 141   POTASSIUM mmol/L 3.4*   CHLORIDE mmol/L 100   CO2 mmol/L 28.3   BUN mg/dL 14   CREATININE mg/dL 1.06   GLUCOSE mg/dL 81   CALCIUM mg/dL 9.6         Results from last 7 days   Lab Units 08/17/24  0442   WBC 10*3/mm3 8.90   HEMOGLOBIN g/dL 16.5   HEMATOCRIT % 49.8   PLATELETS 10*3/mm3 189             Results from last 7 days   Lab Units 08/17/24  0442   CHOLESTEROL mg/dL 157   TRIGLYCERIDES mg/dL 94   HDL CHOL mg/dL 46                   Medication Review:   buPROPion XL, 300 mg, Oral, Daily  carvedilol, 6.25 mg, Oral, BID With Meals  digoxin, 125 mcg, Oral, Daily  fluticasone, 1 spray, Nasal, Daily  folic acid, 1 mg, Oral, Daily  furosemide, 80 mg, Intravenous, Q12H  LORazepam, 1 mg, Oral, Q12H   Followed by  [START ON 8/19/2024] LORazepam, 1 mg, Oral, Daily  methocarbamol, 750 mg, Oral, TID  multivitamin with minerals, 1 tablet, Oral, Daily  rivaroxaban, 20 mg, Oral, Daily  sacubitril-valsartan, 1 tablet, Oral, Q12H  thiamine (B-1) IV, 200 mg, Intravenous, Q8H   Followed by  [START ON 8/21/2024] thiamine, 100 mg, Oral, Daily         sodium chloride, 75 mL/hr, Last Rate: 75 mL/hr (08/16/24 0828)        Assessment & Plan     Active Hospital Problems    Diagnosis  POA    **Acute on chronic systolic CHF (congestive heart failure) [I50.23]  Unknown     Priority: High    CHF (congestive heart failure), NYHA class III, acute, systolic [I50.21]  Yes      Resolved Hospital Problems   No resolved problems to display.        Decompensated heart failure -chronic LV systolic dysfunction.  He has a history of heart failure with recovered ejection fraction in the past and known history of alcohol abuse.  He saw me in July 2023 with known history of chronic atrial fibrillation.  He was euvolemic at that time and counseled on need for cessation of alcohol use.  He came back in  January 2024 with increased fatigue and dyspnea on exertion.  I sent him from clinic to the [facility] emergency room. He was sent home from that visit on antibiotics after a dose of diuretics.  He showed back up in the hospital with decompensated heart failure a week later. echocardiogram was ordered showing EF of 32%.  He was started on Entresto and carvedilol as well as spironolactone.  He could not tolerate spironolactone due to symptomatic hypotension which is why we did not add an SGLT2 inhibitor.  He has progressive heart failure symptoms and was brought to the hospital for left and right heart catheterization yesterday.  Cardiac index of 1.2 L/min/m² with right atrial pressure of 20, mean pulmonary artery pressure of 55 and a diastolic PA of 34 correlating with a mean wedge of 35.  Pulmonary venous hypertension secondary to systolic heart failure.  He diuresed well over admission.  I will switch him over from IV Lasix to oral Bumex as he feels that he is not getting the same response out of Lasix recently.  We will have physical therapy work with him today.  Possible home tomorrow but I will need to discuss his situation with electrophysiology as outpatient.  We had a long discussion regarding sodium and volume restriction.  He will need consideration for cardiac rehab as outpatient.  Chronic atrial fibrillation -continue rivaroxaban.  Continue carvedilol.  History of alcohol abuse           Soren Washington Jr, MD  [facility]   [facility]  [address] [place] [address] - [address]  [place] [address]  Office: [phone]    08/18/24  08:26 EDT

## 2024-08-18 NOTE — PLAN OF CARE
Goal Outcome Evaluation:      Patient denies chest pain nor SOA, up with assist x 1, vital signs stable, pt on room air, Afib on the monitor, educate to call for assist, cont on CIWA score 1, still on lasix I/v 80 mg, wife at bedside, cont to monitor.

## 2024-08-18 NOTE — PLAN OF CARE
Goal Outcome Evaluation:  Plan of Care Reviewed With: patient      Pt with history of chronic atrial fibrillation and alcohol abuse presents with weakness and poor motor control. This visit he was able to walk in the room 30'Javi and tolerated well. He anticipates d/c home with OPPT or HHPT services to improve mobility, balance.             Anticipated Discharge Disposition (PT): home with assist, home with home health, home with outpatient therapy services

## 2024-08-19 ENCOUNTER — READMISSION MANAGEMENT (OUTPATIENT)
Dept: CALL CENTER | Facility: HOSPITAL | Age: 74
End: 2024-08-19
Payer: MEDICARE

## 2024-08-19 VITALS
TEMPERATURE: 98 F | HEART RATE: 77 BPM | OXYGEN SATURATION: 97 % | HEIGHT: 68 IN | SYSTOLIC BLOOD PRESSURE: 112 MMHG | BODY MASS INDEX: 24.93 KG/M2 | DIASTOLIC BLOOD PRESSURE: 74 MMHG | RESPIRATION RATE: 16 BRPM | WEIGHT: 164.5 LBS

## 2024-08-19 PROBLEM — I50.23 ACUTE ON CHRONIC HFREF (HEART FAILURE WITH REDUCED EJECTION FRACTION): Status: ACTIVE | Noted: 2024-08-19

## 2024-08-19 LAB
ANION GAP SERPL CALCULATED.3IONS-SCNC: 10 MMOL/L (ref 5–15)
BUN SERPL-MCNC: 24 MG/DL (ref 8–23)
BUN/CREAT SERPL: 17.6 (ref 7–25)
CALCIUM SPEC-SCNC: 9.5 MG/DL (ref 8.6–10.5)
CHLORIDE SERPL-SCNC: 99 MMOL/L (ref 98–107)
CO2 SERPL-SCNC: 30 MMOL/L (ref 22–29)
CREAT SERPL-MCNC: 1.36 MG/DL (ref 0.76–1.27)
EGFRCR SERPLBLD CKD-EPI 2021: 54.9 ML/MIN/1.73
GLUCOSE SERPL-MCNC: 82 MG/DL (ref 65–99)
POTASSIUM SERPL-SCNC: 3.7 MMOL/L (ref 3.5–5.2)
SODIUM SERPL-SCNC: 139 MMOL/L (ref 136–145)

## 2024-08-19 PROCEDURE — 80048 BASIC METABOLIC PNL TOTAL CA: CPT | Performed by: INTERNAL MEDICINE

## 2024-08-19 PROCEDURE — 25010000002 THIAMINE PER 100 MG: Performed by: INTERNAL MEDICINE

## 2024-08-19 PROCEDURE — 99239 HOSP IP/OBS DSCHRG MGMT >30: CPT | Performed by: INTERNAL MEDICINE

## 2024-08-19 RX ORDER — BUMETANIDE 1 MG/1
1 TABLET ORAL DAILY
Qty: 90 TABLET | Refills: 1 | Status: SHIPPED | OUTPATIENT
Start: 2024-08-19

## 2024-08-19 RX ADMIN — METHOCARBAMOL TABLETS 750 MG: 750 TABLET, COATED ORAL at 08:25

## 2024-08-19 RX ADMIN — RIVAROXABAN 20 MG: 20 TABLET, FILM COATED ORAL at 08:25

## 2024-08-19 RX ADMIN — SACUBITRIL AND VALSARTAN 1 TABLET: 24; 26 TABLET, FILM COATED ORAL at 08:25

## 2024-08-19 RX ADMIN — LORAZEPAM 1 MG: 1 TABLET ORAL at 08:25

## 2024-08-19 RX ADMIN — Medication 1 TABLET: at 08:25

## 2024-08-19 RX ADMIN — FOLIC ACID 1 MG: 1 TABLET ORAL at 08:25

## 2024-08-19 RX ADMIN — BUPROPION HYDROCHLORIDE 300 MG: 300 TABLET, EXTENDED RELEASE ORAL at 08:25

## 2024-08-19 RX ADMIN — CARVEDILOL 6.25 MG: 3.12 TABLET, FILM COATED ORAL at 08:25

## 2024-08-19 RX ADMIN — THIAMINE HYDROCHLORIDE 200 MG: 100 INJECTION, SOLUTION INTRAMUSCULAR; INTRAVENOUS at 05:24

## 2024-08-19 RX ADMIN — DIGOXIN 125 MCG: 125 TABLET ORAL at 12:34

## 2024-08-19 RX ADMIN — BUMETANIDE 2 MG: 2 TABLET ORAL at 08:25

## 2024-08-19 RX ADMIN — FLUTICASONE PROPIONATE 1 SPRAY: 50 SPRAY, METERED NASAL at 08:24

## 2024-08-19 NOTE — DISCHARGE SUMMARY
Date of Admission: 8/16/2024    Date of Discharge:  8/19/2024    Discharge Diagnoses:   1.  Acute on chronic HFpEF  2.  Nonischemic cardiomyopathy, EF 30 to 35% by echo on 5/6/2024  3.  Nonobstructive coronary artery disease by cath on 8/16/2024  4.  Severe pulmonary hypertension, mean PA 55 by RHC on 8/16/2024  5.  History of alcohol heavy alcohol use  6.  Persistent atrial fibrillation      Procedures Performed:  1.  Left and right heart catheterization on 8/6/2024       Hospital Course:     This is a very pleasant 73 year-old male who normally follows with Dr. Washington in our practice.  The patient was admitted for a heart catheterization on 8/16/2024 after presenting to the office with decompensated congestive heart failure.  He underwent a left and right heart catheterization on 8/6/2024.  He had nonobstructive coronary artery disease which was not felt to be the cause for his congestive heart failure.  He did have evidence of severe pulmonary hypertension with a mean PA of 55, felt to be from left-sided congestive heart failure.  His wedge pressure was also elevated at 35.  His EF was last noted to be 30 to 35%.    He was diuresed with IV diuretics for several days.  He had not been taking Lasix at home except on an as-needed basis, which he really was not doing at all.  He was discharged with Bumex 1 mg/day to start.  This may need to be increased as an outpatient depending on his clinical course.    His heart rate was controlled, and he remained in atrial fibrillation.  He will continue on the Coreg and digoxin.  He could not tolerate spironolactone secondary to symptomatic hypotension in the past.  He also was not placed on an SGLT2 inhibitor for this reason.  He will continue on Entresto and Coreg for the congestive heart failure.  Sodium and volume restriction were emphasized.  He did tell me that he is only drinking approximately 1 beer per week, although he has had heavier use in the past.  He was  advised against any alcohol at this time.  It is unclear whether this played a role, but he states he is not drinking significantly currently.    He will continue on Xarelto for anticoagulation.  Again, his rate was relatively well-controlled in the 80s to 90s while hospitalized.    I will arrange for follow-up in 1 week with TALHA Garsia.  He will need a BMP checked at that time, and to see if he needs any further increase in his Bumex dose as an outpatient.    Discharge Medications:     Discharge Medications        New Medications        Instructions Start Date   bumetanide 1 MG tablet  Commonly known as: BUMEX   1 mg, Oral, Daily             Continue These Medications        Instructions Start Date   Azelastine-Fluticasone 137-50 MCG/ACT suspension   1 spray, Nasal, Every 12 Hours      buPROPion  MG 24 hr tablet  Commonly known as: WELLBUTRIN XL   300 mg, Oral, Daily      carvedilol 6.25 MG tablet  Commonly known as: COREG   6.25 mg, Oral, 2 Times Daily With Meals      cholecalciferol 10 MCG (400 UNIT) tablet  Commonly known as: VITAMIN D3   Daily      digoxin 125 MCG tablet  Commonly known as: LANOXIN   125 mcg, Oral, Daily Digoxin      fluticasone 50 MCG/ACT nasal spray  Commonly known as: FLONASE   1 spray, Nasal, Daily      LORazepam 1 MG tablet  Commonly known as: ATIVAN   1 mg, Oral, Every 8 Hours PRN      methocarbamol 750 MG tablet  Commonly known as: ROBAXIN   750 mg, Oral, 3 Times Daily, As needed      multivitamin with minerals tablet tablet   Daily      rivaroxaban 20 MG tablet  Commonly known as: Xarelto   20 mg, Oral, Daily      sacubitril-valsartan 24-26 MG tablet  Commonly known as: ENTRESTO   1 tablet, Oral, Every 12 Hours Scheduled      traZODone 50 MG tablet  Commonly known as: DESYREL   50 mg, Oral, Nightly PRN, for sleep             Stop These Medications      furosemide 40 MG tablet  Commonly known as: LASIX            Physical Exam:   General Appearance:    No acute distress,  alert and oriented x4   Lungs:     Clear to auscultation bilaterally     Heart:    Irregularly irregular rhythm and normal rate.  No murmurs, gallops, or  rubs.   Abdomen:     Soft, non-tender, non-distended.    Extremities:   No clubbing, cyanosis, or edema.        Follow-up:  1.  Follow-up with Dr. Washington in 4 weeks  2.  Follow-up with TALHA Garsia, in 1 week      Time Spent on Discharge: 35 minutes      Brandon Luevano MD  08/19/24  12:46 EDT

## 2024-08-19 NOTE — PLAN OF CARE
Goal Outcome Evaluation:         Patient denies chest pain nor SOA, vital signs stable, pt on room air, Afib on the monitor, bed alarm intact, pt educate to call for assist, possible DC today, CIWA 1 , cont to monitor.

## 2024-08-20 ENCOUNTER — TELEPHONE (OUTPATIENT)
Dept: CARDIOLOGY | Facility: CLINIC | Age: 74
End: 2024-08-20

## 2024-08-20 ENCOUNTER — READMISSION MANAGEMENT (OUTPATIENT)
Dept: CALL CENTER | Facility: HOSPITAL | Age: 74
End: 2024-08-20
Payer: MEDICARE

## 2024-08-20 NOTE — OUTREACH NOTE
CHF Week 1 Survey      Flowsheet Row Responses   [facility] patient discharged from? [place]   Does the patient have one of the following disease processes/diagnoses(primary or secondary)? CHF   CHF Week 1 attempt successful? Yes   Call start time 1725   Call end time 1742   Discharge diagnosis Acute on chronic systolic CHF (congestive heart failure)   Person spoke with today (if not patient) and relationship Levi, wife   Meds reviewed with patient/caregiver? Yes   Is the patient having any side effects they believe may be caused by any medication additions or changes? No   Does the patient have all medications ordered at discharge? Yes   Is the patient taking all medications as directed (includes completed medication regime)? Yes   Does the patient have a primary care provider?  Yes   Does the patient have an appointment with their PCP within 7 days of discharge? Yes   Nursing Interventions Advised patient to make appointment   Has the patient kept scheduled appointments due by today? N/A   Comments spouse was driving, phone number given for [facility] CHF clinic, will call for appt when able   Has home health visited the patient within 72 hours of discharge? N/A   Home health comments Spouse states feels like pt could use some HH PT or OP PT or card rehab, advised to discuss with PCP for orders and arrangements, case management note states no HH needs were identified at discharge, also recommended for spouse to make appt with CHF clinic at [facility] to assist with best plan for pt.   Pulse Ox monitoring None   Psychosocial issues? No   Did the patient receive a copy of their discharge instructions? Yes   Nursing interventions Reviewed instructions with patient   What is the patient's perception of their health status since discharge? Improving   Nursing interventions Nurse provided patient education   Is the patient able to teach back signs and symptoms of worsening condition? (i.e. weight gain, shortness of  air, etc.) Yes   Is the patient/caregiver able to teach back the hierarchy of who to call/visit for symptoms/problems? PCP, Specialist, Home health nurse, Urgent Care, ED, 911 Yes   Is the patient able to teach back Heart Failure Zones? Yes   CHF Zone this Call Green Zone   Green Zone Patient reports doing well, No new or worsening shortness of breath, Physical activity level is normal for you, No new swelling -  feet, ankles and legs look normal for you, Weight check stable, No chest pain   Green Zone Interventions Daily weight check, Low sodium diet, Follow up visits planned, Meds as directed    CHF Week 1 call completed? Yes   Call end time 2012            Kyara DONOVAN - Registered Nurse

## 2024-08-20 NOTE — OUTREACH NOTE
Prep Survey      Flowsheet Row Responses   [facility] facility patient discharged from? [place]   Is LACE score < 7 ? No   Eligibility Readm Mgmt   Discharge diagnosis Acute on chronic systolic CHF (congestive heart failure)   Does the patient have one of the following disease processes/diagnoses(primary or secondary)? CHF   Does the patient have Home health ordered? No   Is there a DME ordered? No   Prep survey completed? Yes            Anny DONOVAN - Registered Nurse

## 2024-08-21 ENCOUNTER — TELEPHONE (OUTPATIENT)
Dept: CARDIOLOGY | Facility: CLINIC | Age: 74
End: 2024-08-21
Payer: MEDICARE

## 2024-08-21 NOTE — TELEPHONE ENCOUNTER
I was given permission by patient to speak to his son Janes.  We discussed lifestyle modifications and medical therapy for systolic heart failure.  I am to see him in less than a month and probably set him up in the heart failure clinic.  They will continue to work and support him as they can.   Initial (On Arrival)

## 2024-08-23 ENCOUNTER — TELEPHONE (OUTPATIENT)
Dept: CARDIOLOGY | Facility: CLINIC | Age: 74
End: 2024-08-23
Payer: MEDICARE

## 2024-08-23 NOTE — TELEPHONE ENCOUNTER
Caller: Levi Somers    Relationship: Emergency Contact    Best call back number: [phone]    Who is your current provider: DR. JOHNSON    Is your current provider offboarding? NO    Who would you like your new provider to be: DR. SOSA    What are your reasons for transferring care: PT PREFERRED DR. SOSA    Additional notes: PT'S WIFE SAID PT REALLY LIKED DR. SOSA AND WOULD PREFER TO SEE HER INSTEAD OF DR. JOHNSON.

## 2024-08-23 NOTE — TELEPHONE ENCOUNTER
Caller: Levi Somers    Relationship: Emergency Contact    Best call back number: [phone]    Who is your current provider: DR. JOHNSON    Is your current provider offboarding? NO    Who would you like your new provider to be: DR. SOSA    What are your reasons for transferring care: PT PREFERRED DR. SOSA    Additional notes: PT'S WIFE SAID PT REALLY LIKED DR. SOSA AND WOULD PREFER TO SEE HER INSTEAD OF DR. JOHNSON. SHE WANTED TO LET DR. SOSA KNOW THAT THE PT HAS STAGE D HEART FAILURE AND COPD.

## 2024-08-23 NOTE — TELEPHONE ENCOUNTER
Caller: Levi Somers    Relationship: Emergency Contact    Best call back number: [phone]    Who are you requesting to speak with (clinical staff, provider,  specific staff member): ANYONE    What was the call regarding: PT'S WIFE IS CALLING BACK TO LET US KNOW HE DOESN'T WANT TO SWITCH TO SEEING DR. SOSA BUT HE WANTS A SECOND OPINION FROM HER. PLEASE ADVISE

## 2024-08-27 ENCOUNTER — READMISSION MANAGEMENT (OUTPATIENT)
Dept: CALL CENTER | Facility: HOSPITAL | Age: 74
End: 2024-08-27
Payer: MEDICARE

## 2024-08-27 NOTE — TELEPHONE ENCOUNTER
Caller: Levi Somers    Relationship: Emergency Contact    Best call back number: [phone]    Who are you requesting to speak with (clinical staff, provider,  specific staff member): CLINICAL    What was the call regarding: PT IS CALLING TO SPEAK WITH DR. SOSA ABOUT A PRIVATE MATTER. HE SAID HE DOESN'T WANT TO SWITCH TO SEEING HER BUT WOULD LIKE AN APPT. PLEASE ADVISE

## 2024-08-27 NOTE — TELEPHONE ENCOUNTER
Caller: Levi Somers    Relationship: Emergency Contact    Best call back number: [phone]    Who are you requesting to speak with (clinical staff, provider,  specific staff member): CLINICAL    What was the call regarding: PT IS CALLING TO SPEAK WITH DR. SOSA ABOUT A PRIVATE MATTER. HE SAID HE DOESN'T WANT TO SWITCH TO SEEING HER BUT WOULD LIKE AN APPT. PLEASE ADVISE

## 2024-08-27 NOTE — OUTREACH NOTE
CHF Week 2 Survey      Flowsheet Row Responses   [facility] patient discharged from? [place]   Does the patient have one of the following disease processes/diagnoses(primary or secondary)? CHF   Week 2 attempt successful? No   Call start time 1628   Unsuccessful attempts Attempt 1  [spoke with wife who was mirnaenlty on her way to the hospital]   Discharge diagnosis Acute on chronic systolic CHF (congestive heart failure)            KARRI Tello Registered Nurse

## 2024-08-27 NOTE — TELEPHONE ENCOUNTER
I called and discussed with pt's wife and informed her that we would not be about to accommodate that request if pt is not switching providers.  She verbalized understanding and stated that she was trying to informed pt that the request would be denied.

## 2024-08-29 ENCOUNTER — READMISSION MANAGEMENT (OUTPATIENT)
Dept: CALL CENTER | Facility: HOSPITAL | Age: 74
End: 2024-08-29
Payer: MEDICARE

## 2024-08-29 NOTE — OUTREACH NOTE
CHF Week 2 Survey      Flowsheet Row Responses   [facility] facility patient discharged from? [place]   Does the patient have one of the following disease processes/diagnoses(primary or secondary)? CHF   Week 2 attempt successful? No   Unsuccessful attempts Attempt 2            Nelida SMALLWOOD - Registered Nurse

## 2024-09-10 ENCOUNTER — READMISSION MANAGEMENT (OUTPATIENT)
Dept: CALL CENTER | Facility: HOSPITAL | Age: 74
End: 2024-09-10
Payer: MEDICARE

## 2024-09-10 NOTE — OUTREACH NOTE
CHF Week 3 Survey      Flowsheet Row Responses   [facility] patient discharged from? [place]   Does the patient have one of the following disease processes/diagnoses(primary or secondary)? CHF   Week 3 attempt successful? No   Unsuccessful attempts Attempt 1   oke Fifi FORTUNE - Registered Nurse

## 2024-10-21 ENCOUNTER — TELEPHONE (OUTPATIENT)
Dept: CARDIOLOGY | Facility: CLINIC | Age: 74
End: 2024-10-21
Payer: MEDICARE

## 2024-10-24 ENCOUNTER — TELEPHONE (OUTPATIENT)
Dept: CARDIOLOGY | Facility: CLINIC | Age: 74
End: 2024-10-24
Payer: MEDICARE

## 2024-10-24 NOTE — TELEPHONE ENCOUNTER
Faxed prescription to [facility].  Fax# [phone].  Faxed confirmation received./ CHAUNCEY

## 2024-10-24 NOTE — TELEPHONE ENCOUNTER
See other telephone encounter.    Lucy Ko RN  [facility] Triage  10/24/24 12:47 EDT

## 2024-10-24 NOTE — TELEPHONE ENCOUNTER
Patient called back.  They cannot accept and electronic script to the [facility] where this was sent (apparently per the pt only employees can use that pharmacy).    So I looked in the media tab and I can see dated 8/14/24 where his assistance paperwork was sent in with a script from Dr. Washington for the Entresto.      I decided to call [facility] and spoke with Kathrin.  The script that was sent to them is invalid, she could not tell me why, and they are needing another signed script sent to them for the Entresto.    I am happy to do this I am just not sure where we have a printer in the office with script paper?      If someone can take care of the script part- I am happy to come get it and fax it to [phone].  They request 90 day supply with refills.  Once they receive this they will be able to send the medication out to the pt.    [facility] T[facility]  10/24/24 13:01 EDT

## 2024-10-24 NOTE — TELEPHONE ENCOUNTER
"Pt called into the office asking for his Entresto script to be sent to the \"[facility] (fax [phone], address at [address] [place] [address], [address], [place] [address]).  Pt tells me that he's in the donut hole and he's trying to get the entresto script for free.  He wasn't asking for samples.    I tried explaining to pt that from my understanding, [facility] isn't a pharmacy and more so deals with prior authorizations on medications.  He insisted his entresto script needed to be sent here.    Can someone please look into this and help him?    Thank you,    Deedee MCKENNA RN  Triage [facility]  10/24/24 12:27 EDT    "

## 2024-10-25 NOTE — TELEPHONE ENCOUNTER
Permission for the hub to transfer this call directly to the nurse triage line at [facility].    Triage: I called patient this morning and left a VM to call back.  When he returns call I can speak with him if I am available.  If I am not, can you please tell him that we faxed his Entresto script to [facility].  Also can you ask him if he needs Entresto samples?  If so I have 1 months worth on my desk while he waits for the assistance to go through and the med to be shipped.    Lucy Ko RN  [facility] Triage  10/25/24 08:21 EDT

## 2024-10-25 NOTE — TELEPHONE ENCOUNTER
I spoke with patient.  I let him know I faxed his script.  I also provided samples for pt on 6th floor Entresto 24-26 mg. Lot [id number] Exp 12/2026. Pt verbalizes understanding and will get them today.    Lucy Ko RN  [facility] Triage  10/25/24 13:38 EDT

## 2024-10-25 NOTE — PROGRESS NOTES
RM:________     PCP: Yuliana Raymundo APRN    : 1950  AGE: 73 y.o.  EST PATIENT     REASON FOR VISIT/  CC:        BP Readings from Last 3 Encounters:   24 112/74   24 138/82   24 112/70      Wt Readings from Last 3 Encounters:   24 74.6 kg (164 lb 8 oz)   24 76.8 kg (169 lb 6.4 oz)   24 72.6 kg (160 lb)        WT: ____________ BP: __________L __________R HR______    CHEST PAIN: _____________    SOA: _____________PALPS: _______________     LIGHTHEADED: ___________FATIGUE: ________________ EDEMA __________    ALLERGIES:Spironolactone SMOKING HISTORY:  Social History     Tobacco Use    Smoking status: Former     Current packs/day: 0.00     Average packs/day: 2.0 packs/day for 45.0 years (90.0 ttl pk-yrs)     Types: Cigarettes     Start date: 1967     Quit date: 2012     Years since quittin.7    Smokeless tobacco: Never    Tobacco comments:     caffeine use   Vaping Use    Vaping status: Never Used   Substance Use Topics    Alcohol use: Not Currently     Comment: one time weekly    Drug use: No     CAFFEINE USE_________________  ALCOHOL ______________________

## 2024-11-07 ENCOUNTER — OFFICE VISIT (OUTPATIENT)
Dept: CARDIOLOGY | Facility: CLINIC | Age: 74
End: 2024-11-07
Payer: MEDICARE

## 2024-11-07 VITALS
WEIGHT: 165 LBS | HEART RATE: 69 BPM | HEIGHT: 68 IN | BODY MASS INDEX: 25.01 KG/M2 | DIASTOLIC BLOOD PRESSURE: 70 MMHG | SYSTOLIC BLOOD PRESSURE: 122 MMHG

## 2024-11-07 DIAGNOSIS — I50.22 CHRONIC HFREF (HEART FAILURE WITH REDUCED EJECTION FRACTION): ICD-10-CM

## 2024-11-07 DIAGNOSIS — I27.20 PULMONARY HYPERTENSION: ICD-10-CM

## 2024-11-07 DIAGNOSIS — I10 ESSENTIAL HYPERTENSION: ICD-10-CM

## 2024-11-07 DIAGNOSIS — I48.11 LONGSTANDING PERSISTENT ATRIAL FIBRILLATION: ICD-10-CM

## 2024-11-07 DIAGNOSIS — R49.0 HOARSENESS: ICD-10-CM

## 2024-11-07 DIAGNOSIS — H93.8X2 EAR FULLNESS, LEFT: ICD-10-CM

## 2024-11-07 DIAGNOSIS — I42.8 NONISCHEMIC CARDIOMYOPATHY: Primary | ICD-10-CM

## 2024-11-07 PROBLEM — D72.829 LEUKOCYTOSIS: Status: RESOLVED | Noted: 2024-01-21 | Resolved: 2024-11-07

## 2024-11-07 PROBLEM — I50.21 ACUTE SYSTOLIC (CONGESTIVE) HEART FAILURE: Status: RESOLVED | Noted: 2024-01-21 | Resolved: 2024-11-07

## 2024-11-07 RX ORDER — BUPROPION HYDROCHLORIDE 75 MG/1
75 TABLET ORAL DAILY
COMMUNITY
Start: 2024-09-25

## 2024-11-07 RX ORDER — FUROSEMIDE 40 MG/1
TABLET ORAL
Qty: 30 TABLET | Refills: 1 | Status: SHIPPED | OUTPATIENT
Start: 2024-11-07

## 2024-11-07 RX ORDER — FLUTICASONE FUROATE, UMECLIDINIUM BROMIDE AND VILANTEROL TRIFENATATE 200; 62.5; 25 UG/1; UG/1; UG/1
POWDER RESPIRATORY (INHALATION)
COMMUNITY
Start: 2024-10-28

## 2024-11-07 NOTE — LETTER
2024     TALHA Trevizo  [address] [facility] [place] [address]    Patient: Jered Morejon   YOB: 1950   Date of Visit: 2024       Dear TALHA Trevizo    Jered Morejon was in my office today. Below is a copy of my note.    If you have questions, please do not hesitate to call me. I look forward to following Jered along with you.         Sincerely,        Anmol Rivera MD        CC: Jamal Perez III, MD    Date of Office Visit: 24  Encounter Provider: Anmol Rivera MD  Place of Service: [facility] CARDIOLOGY  Patient Name: Jered Morejon  :1950    Chief Complaint   Patient presents with   • Congestive Heart Failure   :     HPI:     Mr. Morejon is 73 y.o. and presents today to establish care with me. I have reviewed prior notes and there are no changes except for any new updates described below. I have also reviewed any information entered into the medical record by the patient or by ancillary staff.     He has chronic atrial fibrillation. He has nonischemic cardiomyopathy and chronic HFrEF. He was admitted in 2024 with volume overload. He underwent coronary angiography, which revealed mild nonocclusive CAD. His LVEDP was 30 mm Hg, CVP 20 mm Hg, PA 88/34/55mm Hg, CI 1.2 L/min/m2. An echo revealed an LVEF of 34%, moderate LAE, RVSP 34mm Hg. He was diuresed, but then became dehydrated as an outpatient. He was instructed to use furosemide as needed based upon daily weights; this was changed to bumetanide. However, he'd prefer to go back to furosemide since bumetanide dried him out too much.     He is no longer drinking EtOH. He reports NYHA class IIB symptoms; he is trying to walk regularly. He has no orthopnea, PND, or edema. He denies palps, CP, LH, or syncope. He denies bleeding.    He inquires about left ear fullness and a scratchy voice.    Past Medical History:   Diagnosis Date   • Atrial fibrillation    •  Chronic HFrEF (heart failure with reduced ejection fraction)    • COPD (chronic obstructive pulmonary disease)    • Hypertension    • Nonischemic cardiomyopathy    • Nonocclusive coronary atherosclerosis of native coronary artery     2024: 50% OM1, 40-50% LPDA   • RBBB    • Renal infarct        Past Surgical History:   Procedure Laterality Date   • CARDIAC CATHETERIZATION     • CARDIAC CATHETERIZATION N/A 2024    Procedure: Coronary angiography;  Surgeon: Chava Mcconnell MD;  Location:  [facility] CATH INVASIVE LOCATION;  Service: Cardiovascular;  Laterality: N/A;   • CARDIAC CATHETERIZATION N/A 2024    Procedure: Left heart cath;  Surgeon: Chava Mcconnell MD;  Location:  [facility] CATH INVASIVE LOCATION;  Service: Cardiovascular;  Laterality: N/A;   • CARDIAC CATHETERIZATION N/A 2024    Procedure: Left ventriculography;  Surgeon: Chava Mcconnell MD;  Location:  [facility] CATH INVASIVE LOCATION;  Service: Cardiovascular;  Laterality: N/A;   • CARDIAC CATHETERIZATION N/A 2024    Procedure: Right heart cath;  Surgeon: Chava Mcconnell MD;  Location:  [facility] CATH INVASIVE LOCATION;  Service: Cardiovascular;  Laterality: N/A;   • CARDIOVERSION         Social History     Socioeconomic History   • Marital status:    Tobacco Use   • Smoking status: Former     Current packs/day: 0.00     Average packs/day: 2.0 packs/day for 45.0 years (90.0 ttl pk-yrs)     Types: Cigarettes     Start date: 1967     Quit date: 2012     Years since quittin.8     Passive exposure: Never   • Smokeless tobacco: Never   • Tobacco comments:     caffeine use   Vaping Use   • Vaping status: Never Used   Substance and Sexual Activity   • Alcohol use: Not Currently     Alcohol/week: 1.0 standard drink of alcohol     Types: 1 Cans of beer per week     Comment: one time weekly   • Drug use: No   • Sexual activity: Defer       Family History   Problem Relation Age of Onset   • No Known Problems  Mother    • No Known Problems Father    • No Known Problems Sister    • No Known Problems Brother    • No Known Problems Maternal Aunt    • No Known Problems Maternal Uncle    • No Known Problems Paternal Aunt    • No Known Problems Paternal Uncle    • No Known Problems Maternal Grandmother    • No Known Problems Maternal Grandfather    • No Known Problems Paternal Grandmother    • No Known Problems Paternal Grandfather        Review of Systems   All other systems reviewed and are negative.      Allergies   Allergen Reactions   • Bumex [Bumetanide] Dizziness   • Spironolactone Dizziness         Current Outpatient Medications:   •  Azelastine-Fluticasone 137-50 MCG/ACT suspension, 1 spray into the nostril(s) as directed by provider Every 12 (Twelve) Hours., Disp: , Rfl:   •  buPROPion (WELLBUTRIN) 75 MG tablet, Take 1 tablet by mouth Daily., Disp: , Rfl:   •  carvedilol (COREG) 6.25 MG tablet, Take 1 tablet by mouth 2 (Two) Times a Day With Meals., Disp: 180 tablet, Rfl: 1  •  Cholecalciferol 10 MCG (400 UNIT) tablet, Daily., Disp: , Rfl:   •  digoxin (LANOXIN) 125 MCG tablet, Take 1 tablet by mouth Daily., Disp: 90 tablet, Rfl: 3  •  fluticasone (FLONASE) 50 MCG/ACT nasal spray, Administer 1 spray into the nostril(s) as directed by provider Daily., Disp: , Rfl:   •  LORazepam (ATIVAN) 1 MG tablet, Take 1 tablet by mouth Every 8 (Eight) Hours As Needed for Anxiety., Disp: , Rfl:   •  methocarbamol (ROBAXIN) 750 MG tablet, Take 1 tablet by mouth 3 (Three) Times a Day. As needed, Disp: , Rfl:   •  multivitamin with minerals (MENS MULTIVITAMIN PO), Daily., Disp: , Rfl:   •  rivaroxaban (Xarelto) 20 MG tablet, Take 1 tablet by mouth Daily., Disp: 90 tablet, Rfl: 1  •  sacubitril-valsartan (ENTRESTO) 24-26 MG tablet, Take 1 tablet by mouth Every 12 (Twelve) Hours., Disp: 180 tablet, Rfl: 3  •  traZODone (DESYREL) 50 MG tablet, Take 1 tablet by mouth At Night As Needed for Sleep or Depression. for sleep, Disp: , Rfl:   •   "Dontaeleconcetta Ellipta 200-62.5-25 MCG/ACT inhaler, Daily., Disp: , Rfl:   •  furosemide (LASIX) 40 MG tablet, Take one tablet per day, up to 3 times per week, for swelling/weight gain, Disp: 30 tablet, Rfl: 1      Objective:     Vitals:    11/07/24 1410   BP: 122/70   BP Location: Left arm   Pulse: 69   Weight: 74.8 kg (165 lb)   Height: 172.7 cm (68\")     Body mass index is 25.09 kg/m².    Vitals reviewed.   Constitutional:       Appearance: Well-developed and not in distress.   Eyes:      Conjunctiva/sclera: Conjunctivae normal.   HENT:      Head: Normocephalic.      Nose: Nose normal.   Neck:      Thyroid: Thyroid normal.      Vascular: No JVD. JVD normal.      Lymphadenopathy: No cervical adenopathy.   Pulmonary:      Effort: Pulmonary effort is normal.      Breath sounds: Normal breath sounds.   Cardiovascular:      Normal rate. Regular rhythm.      Murmurs: There is no murmur.   Pulses:     Intact distal pulses.   Edema:     Peripheral edema absent.   Abdominal:      Palpations: Abdomen is soft.      Tenderness: There is no abdominal tenderness.   Musculoskeletal: Normal range of motion.      Cervical back: Normal range of motion. Skin:     General: Skin is warm and dry.   Neurological:      General: No focal deficit present.      Mental Status: Oriented to person, place, and time.      Cranial Nerves: No cranial nerve deficit.   Psychiatric:         Behavior: Behavior normal.         Thought Content: Thought content normal.         Judgment: Judgment normal.         Procedures EKG --   I have personally reviewed EKG on 08/17/2024 and my interpretation of the tracing is as follows: AF, RBBB, diffuse NSST abn        Assessment:       Diagnosis Plan   1. Nonischemic cardiomyopathy  Adult Transthoracic Echo Complete W/ Cont if Necessary Per Protocol      2. Chronic HFrEF (heart failure with reduced ejection fraction)  Adult Transthoracic Echo Complete W/ Cont if Necessary Per Protocol      3. Pulmonary hypertension   "      4. Essential hypertension        5. Longstanding persistent atrial fibrillation        6. Hoarseness        7. Ear fullness, left             Plan:       1/2/3. He's on carvedilol (he previously did not tolerate metoprolol succinate) and sacubitril-valsartan. He became dehydrated on standing loop diuretics and spironolactone. He uses diuretics as needed based on his daily weight. He would like to switch from bumetanide to furosemide, which I did today. He reports always feeling dry and thirsty, so I don't think he'll tolerate an SGLT2i. I'll repeat an echo in March; if his EF is <35%, we will need to discuss ICD/primary prevention.    4. His BP is within goal.    5. He is rate controlled and anticoagulated.     6/7. He has previously seen Dr Nuno; I asked him to call his office to make an appointment, especially for the new hoarseness.    Sincerely,       Anmol Rivera MD

## 2024-11-07 NOTE — PROGRESS NOTES
Date of Office Visit: 24  Encounter Provider: Jazzy Bean MA  Place of Service: [facility] CARDIOLOGY  Patient Name: Jered Morejon  :1950    Chief Complaint   Patient presents with    Congestive Heart Failure   :     HPI:     Mr. Morejon is 73 y.o. and presents today ***      Past Medical History:   Diagnosis Date    Atrial fibrillation     COPD (chronic obstructive pulmonary disease)     Hypertension     RBBB     Renal infarct        Past Surgical History:   Procedure Laterality Date    CARDIAC CATHETERIZATION      CARDIAC CATHETERIZATION N/A 2024    Procedure: Coronary angiography;  Surgeon: Chava Mcconnell MD;  Location:  [facility] CATH INVAS[facility] LOC[facility];  Service: Cardiovascular;  Laterality: N/A;    CARDIAC CATHETERIZATION N/A 2024    Procedure: Left heart cath;  Surgeon: Chava Mcconnell MD;  Location:  [facility] INVAS[facility] LOC[facility];  Service: Cardiovascular;  Laterality: N/A;    CARDIAC CATHETERIZATION N/A 2024    Procedure: Left ventriculography;  Surgeon: Chava Mcconnell MD;  Location:  [facility] INVAS[facility] LOC[facility];  Service: Cardiovascular;  Laterality: N/A;    CARDIAC CATHETERIZATION N/A 2024    Procedure: Right heart cath;  Surgeon: Chava Mcconnell MD;  Location:  [facility] INVAS[facility] LOCATION;  Service: Cardiovascular;  Laterality: N/A;    CARDIOVERSION         Social History     Socioeconomic History    Marital status:    Tobacco Use    Smoking status: Former     Current packs/day: 0.00     Average packs/day: 2.0 packs/day for 45.0 years (90.0 ttl pk-yrs)     Types: Cigarettes     Start date: 1967     Quit date: 2012     Years since quittin.8     Passive exposure: Never    Smokeless tobacco: Never    Tobacco comments:     caffeine use   Vaping Use    Vaping status: Never Used   Substance and Sexual Activity    Alcohol use: Not Currently     Alcohol/week: 1.0 standard drink of alcohol      Types: 1 Cans of beer per week     Comment: one time weekly    Drug use: No    Sexual activity: Defer       Family History   Problem Relation Age of Onset    No Known Problems Mother     No Known Problems Father     No Known Problems Sister     No Known Problems Brother     No Known Problems Maternal Aunt     No Known Problems Maternal Uncle     No Known Problems Paternal Aunt     No Known Problems Paternal Uncle     No Known Problems Maternal Grandmother     No Known Problems Maternal Grandfather     No Known Problems Paternal Grandmother     No Known Problems Paternal Grandfather        ROS    Allergies   Allergen Reactions    Bumex [Bumetanide] Dizziness    Spironolactone Dizziness         Current Outpatient Medications:     Azelastine-Fluticasone 137-50 MCG/ACT suspension, 1 spray into the nostril(s) as directed by provider Every 12 (Twelve) Hours., Disp: , Rfl:     buPROPion (WELLBUTRIN) 75 MG tablet, Take 1 tablet by mouth Daily., Disp: , Rfl:     carvedilol (COREG) 6.25 MG tablet, Take 1 tablet by mouth 2 (Two) Times a Day With Meals., Disp: 180 tablet, Rfl: 1    Cholecalciferol 10 MCG (400 UNIT) tablet, Daily., Disp: , Rfl:     digoxin (LANOXIN) 125 MCG tablet, Take 1 tablet by mouth Daily., Disp: 90 tablet, Rfl: 3    fluticasone (FLONASE) 50 MCG/ACT nasal spray, Administer 1 spray into the nostril(s) as directed by provider Daily., Disp: , Rfl:     LORazepam (ATIVAN) 1 MG tablet, Take 1 tablet by mouth Every 8 (Eight) Hours As Needed for Anxiety., Disp: , Rfl:     methocarbamol (ROBAXIN) 750 MG tablet, Take 1 tablet by mouth 3 (Three) Times a Day. As needed, Disp: , Rfl:     multivitamin with minerals (MENS MULTIVITAMIN PO), Daily., Disp: , Rfl:     rivaroxaban (Xarelto) 20 MG tablet, Take 1 tablet by mouth Daily., Disp: 90 tablet, Rfl: 1    sacubitril-valsartan (ENTRESTO) 24-26 MG tablet, Take 1 tablet by mouth Every 12 (Twelve) Hours., Disp: 180 tablet, Rfl: 3    traZODone (DESYREL) 50 MG tablet, Take 1  tablet by mouth At Night As Needed for Sleep or Depression. for sleep, Disp: , Rfl:     Trelegy Ellipta 200-62.5-25 MCG/ACT inhaler, Daily., Disp: , Rfl:     bumetanide (BUMEX) 1 MG tablet, Take 1 tablet by mouth Daily. (Patient not taking: Reported on 11/7/2024), Disp: 90 tablet, Rfl: 1      Objective:     There were no vitals filed for this visit.  There is no height or weight on file to calculate BMI.    Physical Exam    Procedures      Assessment:      No diagnosis found.       Plan:       ***    Sincerely,       Jazzy Bean MA

## 2024-11-07 NOTE — PROGRESS NOTES
Date of Office Visit: 24  Encounter Provider: Anmol Rivera MD  Place of Service: [facility] CARDIOLOGY  Patient Name: Jered Morejon  :1950    Chief Complaint   Patient presents with    Congestive Heart Failure   :     HPI:     Mr. Morejon is 73 y.o. and presents today to establish care with me. I have reviewed prior notes and there are no changes except for any new updates described below. I have also reviewed any information entered into the medical record by the patient or by ancillary staff.     He has chronic atrial fibrillation. He has nonischemic cardiomyopathy and chronic HFrEF. He was admitted in 2024 with volume overload. He underwent coronary angiography, which revealed mild nonocclusive CAD. His LVEDP was 30 mm Hg, CVP 20 mm Hg, PA 88/34/55mm Hg, CI 1.2 L/min/m2. An echo revealed an LVEF of 34%, moderate LAE, RVSP 34mm Hg. He was diuresed, but then became dehydrated as an outpatient. He was instructed to use furosemide as needed based upon daily weights; this was changed to bumetanide. However, he'd prefer to go back to furosemide since bumetanide dried him out too much.     He is no longer drinking EtOH. He reports NYHA class IIB symptoms; he is trying to walk regularly. He has no orthopnea, PND, or edema. He denies palps, CP, LH, or syncope. He denies bleeding.    He inquires about left ear fullness and a scratchy voice.    Past Medical History:   Diagnosis Date    Atrial fibrillation     Chronic HFrEF (heart failure with reduced ejection fraction)     COPD (chronic obstructive pulmonary disease)     Hypertension     Nonischemic cardiomyopathy     Nonocclusive coronary atherosclerosis of native coronary artery     2024: 50% OM1, 40-50% LPDA    RBBB     Renal infarct        Past Surgical History:   Procedure Laterality Date    CARDIAC CATHETERIZATION      CARDIAC CATHETERIZATION N/A 2024    Procedure: Coronary angiography;  Surgeon: Enedina  Chava OLSON MD;  Location:  [facility] CATH INVASIVE LOCATION;  Service: Cardiovascular;  Laterality: N/A;    CARDIAC CATHETERIZATION N/A 2024    Procedure: Left heart cath;  Surgeon: Chava Mcconnell MD;  Location: [facility] CATH INVAS[facility] LOC[facility];  Service: Cardiovascular;  Laterality: N/A;    CARDIAC CATHETERIZATION N/A 2024    Procedure: Left ventriculography;  Surgeon: Chava Mcconnell MD;  Location: [facility] CATH INVASIVE LOCATION;  Service: Cardiovascular;  Laterality: N/A;    CARDIAC CATHETERIZATION N/A 2024    Procedure: Right heart cath;  Surgeon: Chava Mcconnell MD;  Location:  [facility] CATH INVASIVE LOCATION;  Service: Cardiovascular;  Laterality: N/A;    CARDIOVERSION         Social History     Socioeconomic History    Marital status:    Tobacco Use    Smoking status: Former     Current packs/day: 0.00     Average packs/day: 2.0 packs/day for 45.0 years (90.0 ttl pk-yrs)     Types: Cigarettes     Start date: 1967     Quit date: 2012     Years since quittin.8     Passive exposure: Never    Smokeless tobacco: Never    Tobacco comments:     caffeine use   Vaping Use    Vaping status: Never Used   Substance and Sexual Activity    Alcohol use: Not Currently     Alcohol/week: 1.0 standard drink of alcohol     Types: 1 Cans of beer per week     Comment: one time weekly    Drug use: No    Sexual activity: Defer       Family History   Problem Relation Age of Onset    No Known Problems Mother     No Known Problems Father     No Known Problems Sister     No Known Problems Brother     No Known Problems Maternal Aunt     No Known Problems Maternal Uncle     No Known Problems Paternal Aunt     No Known Problems Paternal Uncle     No Known Problems Maternal Grandmother     No Known Problems Maternal Grandfather     No Known Problems Paternal Grandmother     No Known Problems Paternal Grandfather        Review of Systems   All other systems reviewed and are  "negative.      Allergies   Allergen Reactions    Bumex [Bumetanide] Dizziness    Spironolactone Dizziness         Current Outpatient Medications:     Azelastine-Fluticasone 137-50 MCG/ACT suspension, 1 spray into the nostril(s) as directed by provider Every 12 (Twelve) Hours., Disp: , Rfl:     buPROPion (WELLBUTRIN) 75 MG tablet, Take 1 tablet by mouth Daily., Disp: , Rfl:     carvedilol (COREG) 6.25 MG tablet, Take 1 tablet by mouth 2 (Two) Times a Day With Meals., Disp: 180 tablet, Rfl: 1    Cholecalciferol 10 MCG (400 UNIT) tablet, Daily., Disp: , Rfl:     digoxin (LANOXIN) 125 MCG tablet, Take 1 tablet by mouth Daily., Disp: 90 tablet, Rfl: 3    fluticasone (FLONASE) 50 MCG/ACT nasal spray, Administer 1 spray into the nostril(s) as directed by provider Daily., Disp: , Rfl:     LORazepam (ATIVAN) 1 MG tablet, Take 1 tablet by mouth Every 8 (Eight) Hours As Needed for Anxiety., Disp: , Rfl:     methocarbamol (ROBAXIN) 750 MG tablet, Take 1 tablet by mouth 3 (Three) Times a Day. As needed, Disp: , Rfl:     multivitamin with minerals (MENS MULTIVITAMIN PO), Daily., Disp: , Rfl:     rivaroxaban (Xarelto) 20 MG tablet, Take 1 tablet by mouth Daily., Disp: 90 tablet, Rfl: 1    sacubitril-valsartan (ENTRESTO) 24-26 MG tablet, Take 1 tablet by mouth Every 12 (Twelve) Hours., Disp: 180 tablet, Rfl: 3    traZODone (DESYREL) 50 MG tablet, Take 1 tablet by mouth At Night As Needed for Sleep or Depression. for sleep, Disp: , Rfl:     Trelegy Ellipta 200-62.5-25 MCG/ACT inhaler, Daily., Disp: , Rfl:     furosemide (LASIX) 40 MG tablet, Take one tablet per day, up to 3 times per week, for swelling/weight gain, Disp: 30 tablet, Rfl: 1      Objective:     Vitals:    11/07/24 1410   BP: 122/70   BP Location: Left arm   Pulse: 69   Weight: 74.8 kg (165 lb)   Height: 172.7 cm (68\")     Body mass index is 25.09 kg/m².    Vitals reviewed.   Constitutional:       Appearance: Well-developed and not in distress.   Eyes:      " Conjunctiva/sclera: Conjunctivae normal.   HENT:      Head: Normocephalic.      Nose: Nose normal.   Neck:      Thyroid: Thyroid normal.      Vascular: No JVD. JVD normal.      Lymphadenopathy: No cervical adenopathy.   Pulmonary:      Effort: Pulmonary effort is normal.      Breath sounds: Normal breath sounds.   Cardiovascular:      Normal rate. Regular rhythm.      Murmurs: There is no murmur.   Pulses:     Intact distal pulses.   Edema:     Peripheral edema absent.   Abdominal:      Palpations: Abdomen is soft.      Tenderness: There is no abdominal tenderness.   Musculoskeletal: Normal range of motion.      Cervical back: Normal range of motion. Skin:     General: Skin is warm and dry.   Neurological:      General: No focal deficit present.      Mental Status: Oriented to person, place, and time.      Cranial Nerves: No cranial nerve deficit.   Psychiatric:         Behavior: Behavior normal.         Thought Content: Thought content normal.         Judgment: Judgment normal.         Procedures EKG --   I have personally reviewed EKG on 08/17/2024 and my interpretation of the tracing is as follows: AF, RBBB, diffuse NSST abn        Assessment:       Diagnosis Plan   1. Nonischemic cardiomyopathy  Adult Transthoracic Echo Complete W/ Cont if Necessary Per Protocol      2. Chronic HFrEF (heart failure with reduced ejection fraction)  Adult Transthoracic Echo Complete W/ Cont if Necessary Per Protocol      3. Pulmonary hypertension        4. Essential hypertension        5. Longstanding persistent atrial fibrillation        6. Hoarseness        7. Ear fullness, left             Plan:       1/2/3. He's on carvedilol (he previously did not tolerate metoprolol succinate) and sacubitril-valsartan. He became dehydrated on standing loop diuretics and spironolactone. He uses diuretics as needed based on his daily weight. He would like to switch from bumetanide to furosemide, which I did today. He reports always feeling dry  and thirsty, so I don't think he'll tolerate an SGLT2i. I'll repeat an echo in March; if his EF is <35%, we will need to discuss ICD/primary prevention.    4. His BP is within goal.    5. He is rate controlled and anticoagulated.     6/7. He has previously seen Dr Nuno; I asked him to call his office to make an appointment, especially for the new hoarseness.    Sincerely,       Anmol Rivera MD

## 2024-11-13 ENCOUNTER — OFFICE VISIT (OUTPATIENT)
Dept: SLEEP MEDICINE | Facility: CLINIC | Age: 74
End: 2024-11-13
Payer: MEDICARE

## 2024-11-13 VITALS
OXYGEN SATURATION: 97 % | WEIGHT: 163.2 LBS | HEART RATE: 66 BPM | HEIGHT: 68 IN | BODY MASS INDEX: 24.74 KG/M2 | DIASTOLIC BLOOD PRESSURE: 71 MMHG | SYSTOLIC BLOOD PRESSURE: 136 MMHG

## 2024-11-13 DIAGNOSIS — R53.83 OTHER FATIGUE: ICD-10-CM

## 2024-11-13 DIAGNOSIS — F10.11 HISTORY OF ALCOHOL ABUSE: ICD-10-CM

## 2024-11-13 DIAGNOSIS — I50.20 HEART FAILURE WITH REDUCED EJECTION FRACTION: ICD-10-CM

## 2024-11-13 DIAGNOSIS — Z72.821 INADEQUATE SLEEP HYGIENE: ICD-10-CM

## 2024-11-13 DIAGNOSIS — R29.818 SUSPECTED SLEEP APNEA: Primary | ICD-10-CM

## 2024-11-13 DIAGNOSIS — R06.83 SNORING: ICD-10-CM

## 2024-11-13 DIAGNOSIS — I48.91 ATRIAL FIBRILLATION, UNSPECIFIED TYPE: ICD-10-CM

## 2024-11-13 DIAGNOSIS — J43.9 PULMONARY EMPHYSEMA, UNSPECIFIED EMPHYSEMA TYPE: ICD-10-CM

## 2024-11-13 DIAGNOSIS — G47.30 SLEEP APNEA, UNSPECIFIED TYPE: ICD-10-CM

## 2024-11-13 DIAGNOSIS — R06.81 WITNESSED EPISODE OF APNEA: ICD-10-CM

## 2024-11-13 DIAGNOSIS — F41.9 ANXIETY: ICD-10-CM

## 2024-11-13 PROCEDURE — G0463 HOSPITAL OUTPT CLINIC VISIT: HCPCS

## 2024-11-13 NOTE — PROGRESS NOTES
[facility], [address]  [place] [address]  Phone   Fax       Jered Morejon  [id number]   1950  73 y.o.  male      Referring physician/provider and  PCP Yuliana Raymundo APRN    Type of service: Initial Sleep Medicine Consult.  Date of service: 11/13/2024      Chief Complaint   Patient presents with    Witnessed Apnea    Snoring       History of present illness;  The patient was seen today on 11/13/2024 at [facility].    Thank you for asking to see Jered Morejon, 73 y.o. PMHx HTN, Anxiety/Depression (takes his wifes Ativan - asks me to prescribe him ativan/ non-compliant with trazodone prescribed by his PCP) HFrEF (on Entresto), persistent A-fib, emphysema (follows with pulmonary states he's on Trelegy inhaler for same -former 2 ppd cigarete smoker since he was 14 quit 12 yars ago), History of alcohol (states abstinent for 11 months).  The patient presents for initial evaluation of sleep disordered breathing.  Patient  endorses in 1956 prior surgery namely tonsillectomy,  and in 2004 nasal surgery. But denies UPPP.       Snoring and witnessed apneas by his wife  Obstructive Sleep Apnea Screening: STOP-BANG Sleep Apnea Questionnaire. Reference: Arnulfo F et al. Br J Anaesth, 2012.     Criterion    Yes    No  Do you SNORE loudly?   [x]   Yes  []   No   Do you often feel TIRED, fatigued, or sleepy during the day?    [x]   Yes  []   No  +fatigue epworth normal 6/24  Has anyone OBSERVED you stop breathing during your sleep?    [x]   Yes  []   No  Do you have or are you being treated for high blood PRESSURE?    [x]   Yes  []   No  BMI >32 kg/m2     []   Yes  [x]   No  AGE > 50 years    [x]   Yes  []   No  NECK circumference >16 inches / 40 cm    []   Yes  [x]   No  GENDER: male     [x]   Yes  []   No    LARRY Probability:  []   1-2 - Low  []   3-4 - Intermediate  [x]   5-8 - High    States he's taking lasix but recently stopped taking his lasix - follows with  cardiology  Compliant with Xarelto and Entresto    Save what is noted above in HPI, denies any OTHER past known:  cardiopulmonary conditions/neurologic disorders/neuromuscular disorders  Never needed supplemental O2 at home  Denies any opioid therapy  Denies any metal in head/neck/chest  Denies AICD/PPM and denies plan for either of these      Further Sleep History:    Bedtime: as early as 11 pm usually midnight to 1 am  Rise Time: 10 am or later  Sleep Latency: less than 20 minutes to more than 20 minutes  Screens in bed: yes until he falls asleep  Wake after sleep onset: 4x  Reasons for awakenings: nocturia or apneic events  Number of naps per day: non daily up to 1 per day - when he naps he has difficulty falling asleep at night  Naps restorative: no  Caffeine use: 2 or more beverages/d    RLS Symptoms: No   Bruxism:No   Current sleep related gastroesophageal reflux symptoms:  No   Cataplexy:  No   Sleep Paralysis:  No   Hypnagogic or hypnopompic hallucinations: No   Parasomnias such as sleep walking or sleep eating No     Disclaimer Sleep History: The above sleep history is based on this sleep physician's in room encounter with the patient. Pre encounter self administered questionnaires are taken into consideration and discussed with patient for any discordance. The above documentation by this sleep physician is the most accurate clinical information determined by in room sleep physician encounter with patient.     MEDICAL CONDITIONS (PMH)   HTN  HFrEF  A. Fib    Social history:  Do you drive a commercial vehicle:  No   Shift work:  No   Tobacco use:  No   Alcohol use: 0 per week  Occupation: manager    Family Hx (parents and siblings) (pertaining to sleep medicine)  Patient unable to provide    Medications: reviewed    Review of systems is negative unless otherwise noted per HPI   Disclaimer History: The above history is based on this sleep physician's in room encounter with the patient. Pre encounter self  "administered questionnaires are taken into consideration and discussed with patient for any discordance. The above documentation by this sleep physician is the most accurate clinical information determined by in room sleep physician encounter with patient.     Physical exam:  Vitals:    11/13/24 1300   BP: 136/71   BP Location: Left arm   Patient Position: Sitting   Pulse: 66   SpO2: 97%   Weight: 74 kg (163 lb 3.2 oz)   Height: 172.7 cm (68\")    Body mass index is 24.81 kg/m².   CONSTITUTIONAL:  Non-toxic, In no overt distress   Head: normocephalic   ENT: Mallampati class IV, + macroglossia with tongue ridging, no septal defects   NECK:Neck Circumference: 15.5 inches,no nuchal rigidity  RESPIRATORY SYSTEM: Breath sounds are diminished throughout bilateral lung fields (no rales, no rhonchi, no wheezes), no accessory muscle use  CARDIOVASULAR SYSTEM: Heart sounds are irregularly irregular rhythm and normal rate, no rub, no gallop, trace lower extremity edema  NEUROLOGICAL SYSTEM: Oriented x 3, No gross focal deficits   PSYCHIATRIC SYSTEM: Goal oriented, affect full range appropriate      Office notes from care team reviewed:    -8/6/2024 referral to sleep medicine by cardiology    Labs reviewed.     Most Recent A1C          8/17/2024    04:42   HGBA1C Most Recent   Hemoglobin A1C 5.70     -8/19 2024  Bicarb 30    Imaging/Diagnostics reviewed:       - 5/6/2024 echocardiogram TTE cardiology's interpretation summary:·  Left ventricular systolic function is moderately decreased. Calculated left ventricular EF = 33.5%  ·  The following left ventricular wall segments are hypokinetic: apical lateral, basal inferolateral, mid inferolateral, apical inferior, mid inferior, apical septal, mid inferoseptal and apex hypokinetic.  ·  Left ventricular diastolic function is consistent with (grade I) impaired relaxation.  ·  The left atrial cavity is moderately dilated.  ·  Left atrial volume is moderately increased.  ·  Mild mitral " valve regurgitation is present.  ·  Mild tricuspid valve regurgitation is present.  ·  Estimated right ventricular systolic pressure from tricuspid regurgitation is mildly elevated (35-45 mmHg). Calculated right ventricular systolic pressure from tricuspid regurgitation is 44 mmHg.        Assessment and plan:  Suspected sleep apnea [R29.818]/Other fatigue [R53.83]/Witnessed episode of apnea [R06.81]     Patient's symptoms and examination is consistent with sleep apnea (G47.30). I have talked to the patient about the signs and symptoms of sleep apnea. In addition, I have also discussed pathophysiology of sleep apnea.  I also discussed the complications of untreated sleep apnea including effects on hypertension, diabetes mellitus and nonrestorative sleep with hypersomnia which can increase risk for motor vehicle accidents.  Untreated sleep apnea is also a risk factor for development of atrial fibrillation, hypertension, insulin resistance and cerebrovascular accident.  Discussed in detail of various testing methods including home-based and lab based sleep studies.  Based on history and physical examination and other comorbidities the most appropriate study is to order SPLIT AHI > 15 in laboratory polysomnography, rather than home sleep apnea testing,to rule out diagnosis of sleep apnea per AASM clinical practice guidelines (doi:10.5664/jcsm.6506) secondary to patient’s history of: Significant cardiac history to include HFrEF, significant respiratory disease to include emphysema, most certainly at least moderate severity sleep apnea. All of the patient's questions were answered.    -No BiLevel S/T or ASV if SPLIT    Snoring (R06.83), snoring is the sound created by turbulent airflow vibrating upper airway soft tissue due to limitation of inspiratory airflow. I have also discussed factors affecting snoring including sleep deprivation, sleeping on the back and alcohol ingestion. To minimize snoring, patient is advised to  have adequate sleep, sleep on the side and avoid alcohol and sedative medications before bedtime  Normal BMI patient's BMI is Body mass index is 24.81 kg/m².. Weight loss/weight gain not indicated. Follow up with PCP for preventative care.  Inadequate sleep hygiene [Z72.821]  Multiple inadequate circumstances of sleep addressed. Counseled the patient lifestyle modifications as below to be applied especially night of any sleep study, the patient verbalized understanding of same. Follow up with PCP to reinforce my counseling towards healthy lifestyle modifications if sleep studies are negative.  HTN,  Follow up with primary care physician for continued management. This medical condition would make the patient eligible for a trial of PAP therapy even if sleep study reveals mild severity sleep apnea.  HFrEF/A. Fib, We will need to avoid BiLevel S/T / ASV. In lab spit study as stated above. Follow up with cardiology as previous.  Emphysema, in lab split as stated above. Follow up with PCP to ensure he has care with pulmonary.  Anxiety/Depression/History of EtOH abuse, I am recommending he he follow up with his PCP for psychiatry referral - I made him aware of same and I have requested my clinic notes forwarded to the patient's PCP. There is what appears to be controlled substance misuse/diversion going on with him taking his wiife's Ativan, I will not allow sleep medicine to initiate start or change any medications that may effect the patient's mood as well with comorbid mood disorders - must be guided by psychiatry- consider low dose doxepin for this patient at discretion of PCP or psychiatry clinician who is managing mood disorders for this patient. I did  him to discontinue taking medications that are not prescribed to him. Counseled him compliance with trazodone. Encouraged EtOH abstinence. Follow up with primary care physician /psychiatry for continued management. Comorbid mood disorders would make the  patient eligible for a trial of PAP therapy even if sleep study reveals mild severity sleep apnea.      I have also discussed with the patient the following  Sleep hygiene: Maintain a regular bedtime and wake time, not to watch television or use screens in bed, limit caffeine-containing beverages before bed time and avoid naps during the day (NAPS WILL TAKE AWAY FROM NIGHT TIME SLEEP), reserve bed for intimacy or sleep only.  If patient encounters 20 minutes or more in bed without sleep, then instructed to get out of bed to chair, in dimly lit or dark area, pursue a boring/non-stimulating activity, and return to bed only when sleepy (repeat this step as often as necessary).  Adequate amount of sleep.  Generally most people needs about 7 to 8 hours of sleep.       Patient's questions were answered      I once again thank you for asking me to see this patient in consultation and I have forwarded my opinion and treatment plan.  Please do not hesitate to call me if you have any questions.       EMR Dragon/Transcription disclaimer:   Much of this encounter note is an electronic transcription/translation of spoken language to printed text. The electronic translation of spoken language may permit erroneous, or at times, nonsensical words or phrases to be inadvertently transcribed; Although I have reviewed the note for such errors, some may still exist.     NPI #: [id number]    Robert George, DO  Sleep Medicine  [facility]  11/13/24

## 2024-12-30 NOTE — TELEPHONE ENCOUNTER
Pt called for a refill on his Carvedilol 6.25 mg bid.      Dr. Rivera out of office until 02/07/25.  Pt upcoming appt 03/07/25 with Gisel PALENCIA.  Please see pending rx.

## 2024-12-31 RX ORDER — CARVEDILOL 6.25 MG/1
6.25 TABLET ORAL 2 TIMES DAILY WITH MEALS
Qty: 180 TABLET | Refills: 0 | Status: SHIPPED | OUTPATIENT
Start: 2024-12-31

## 2025-01-08 NOTE — PROGRESS NOTES
I received a message from the [facility] medication management clinic.  They asked that the patient's refills for Entresto and apixaban be sent to the [facility] pharmacy in [place], [place].  Sent.

## 2025-01-15 ENCOUNTER — TELEPHONE (OUTPATIENT)
Dept: CARDIOLOGY | Facility: CLINIC | Age: 75
End: 2025-01-15
Payer: MEDICARE

## 2025-01-15 NOTE — TELEPHONE ENCOUNTER
Dr. Rivera Pt:  Out of office:SICK/ SURGERY  EST APC: REYNALDO JULIO  COVERING APC: JENISE CANELA  [phone]  Please advise.    Pt will need to be scheduled for a colonoscopy with Dr. Vinicius Morejon at [facility].  Pt will need approval to hold Xarelto 1-2 days prior to procedure.

## 2025-01-15 NOTE — TELEPHONE ENCOUNTER
He is at acceptable cardiovascular risk to proceed with endoscopy and may hold Xarelto 1-2 days prior and resume Xarelto as soon as possible post endoscopy.      TALHA Garsia  [facility]   [address]  [place] [address]  Ph: [phone]  Fax: [phone]

## 2025-03-07 ENCOUNTER — HOSPITAL ENCOUNTER (OUTPATIENT)
Dept: CARDIOLOGY | Facility: HOSPITAL | Age: 75
Discharge: HOME | End: 2025-03-07
Payer: MEDICARE

## 2025-03-07 ENCOUNTER — OFFICE VISIT (OUTPATIENT)
Dept: CARDIOLOGY | Facility: CLINIC | Age: 75
End: 2025-03-07
Payer: MEDICARE

## 2025-03-07 VITALS
OXYGEN SATURATION: 98 % | SYSTOLIC BLOOD PRESSURE: 118 MMHG | WEIGHT: 177.8 LBS | HEIGHT: 68 IN | BODY MASS INDEX: 26.95 KG/M2 | DIASTOLIC BLOOD PRESSURE: 76 MMHG | HEART RATE: 80 BPM

## 2025-03-07 VITALS
BODY MASS INDEX: 24.71 KG/M2 | DIASTOLIC BLOOD PRESSURE: 76 MMHG | HEIGHT: 68 IN | WEIGHT: 163 LBS | SYSTOLIC BLOOD PRESSURE: 118 MMHG

## 2025-03-07 DIAGNOSIS — I50.22 CHRONIC HFREF (HEART FAILURE WITH REDUCED EJECTION FRACTION): Primary | ICD-10-CM

## 2025-03-07 DIAGNOSIS — I45.10 RBBB (RIGHT BUNDLE BRANCH BLOCK): ICD-10-CM

## 2025-03-07 DIAGNOSIS — I10 ESSENTIAL HYPERTENSION: ICD-10-CM

## 2025-03-07 DIAGNOSIS — R09.89 PVC (PULMONARY VENOUS CONGESTION): ICD-10-CM

## 2025-03-07 DIAGNOSIS — I25.10 NONOCCLUSIVE CORONARY ATHEROSCLEROSIS OF NATIVE CORONARY ARTERY: ICD-10-CM

## 2025-03-07 DIAGNOSIS — I42.8 NONISCHEMIC CARDIOMYOPATHY: ICD-10-CM

## 2025-03-07 DIAGNOSIS — I48.21 PERMANENT ATRIAL FIBRILLATION: ICD-10-CM

## 2025-03-07 DIAGNOSIS — I50.22 CHRONIC HFREF (HEART FAILURE WITH REDUCED EJECTION FRACTION): ICD-10-CM

## 2025-03-07 LAB
ALBUMIN SERPL-MCNC: 4.1 G/DL (ref 3.5–5.2)
ALBUMIN/GLOB SERPL: 1.5 G/DL
ALP SERPL-CCNC: 60 U/L (ref 39–117)
ALT SERPL W P-5'-P-CCNC: 13 U/L (ref 1–41)
ANION GAP SERPL CALCULATED.3IONS-SCNC: 10.8 MMOL/L (ref 5–15)
ASCENDING AORTA: 3.3 CM
AST SERPL-CCNC: 15 U/L (ref 1–40)
AV MEAN PRESS GRAD SYS DOP V1V2: 3.8 MMHG
AV VMAX SYS DOP: 137.5 CM/SEC
BASOPHILS # BLD AUTO: 0.05 10*3/MM3 (ref 0–0.2)
BASOPHILS NFR BLD AUTO: 0.5 % (ref 0–1.5)
BH CV ECHO MEAS - ACS: 1.54 CM
BH CV ECHO MEAS - AO MAX PG: 7.6 MMHG
BH CV ECHO MEAS - AO ROOT DIAM: 3.2 CM
BH CV ECHO MEAS - AO V2 VTI: 28.5 CM
BH CV ECHO MEAS - AVA(I,D): 1.58 CM2
BH CV ECHO MEAS - EDV(CUBED): 200.3 ML
BH CV ECHO MEAS - EDV(MOD-SP2): 130 ML
BH CV ECHO MEAS - EDV(MOD-SP4): 125 ML
BH CV ECHO MEAS - EF(MOD-SP2): 46.9 %
BH CV ECHO MEAS - EF(MOD-SP4): 45.6 %
BH CV ECHO MEAS - ESV(CUBED): 34.5 ML
BH CV ECHO MEAS - ESV(MOD-SP2): 69 ML
BH CV ECHO MEAS - ESV(MOD-SP4): 68 ML
BH CV ECHO MEAS - FS: 44.3 %
BH CV ECHO MEAS - IVS/LVPW: 1.03 CM
BH CV ECHO MEAS - IVSD: 1.04 CM
BH CV ECHO MEAS - LAT PEAK E' VEL: 6.7 CM/SEC
BH CV ECHO MEAS - LV DIASTOLIC VOL/BSA (35-75): 66.7 CM2
BH CV ECHO MEAS - LV MASS(C)D: 245.2 GRAMS
BH CV ECHO MEAS - LV MAX PG: 2.9 MMHG
BH CV ECHO MEAS - LV MEAN PG: 1.4 MMHG
BH CV ECHO MEAS - LV SYSTOLIC VOL/BSA (12-30): 36.3 CM2
BH CV ECHO MEAS - LV V1 MAX: 85.2 CM/SEC
BH CV ECHO MEAS - LV V1 VTI: 15.3 CM
BH CV ECHO MEAS - LVIDD: 5.9 CM
BH CV ECHO MEAS - LVIDS: 3.3 CM
BH CV ECHO MEAS - LVOT AREA: 2.9 CM2
BH CV ECHO MEAS - LVOT DIAM: 1.94 CM
BH CV ECHO MEAS - LVPWD: 1.01 CM
BH CV ECHO MEAS - MED PEAK E' VEL: 5.1 CM/SEC
BH CV ECHO MEAS - MR MAX PG: 33.1 MMHG
BH CV ECHO MEAS - MR MAX VEL: 287.7 CM/SEC
BH CV ECHO MEAS - MV DEC SLOPE: 584.2 CM/SEC2
BH CV ECHO MEAS - MV DEC TIME: 0.21 SEC
BH CV ECHO MEAS - MV E MAX VEL: 104 CM/SEC
BH CV ECHO MEAS - MV MAX PG: 7.7 MMHG
BH CV ECHO MEAS - MV MEAN PG: 2.12 MMHG
BH CV ECHO MEAS - MV P1/2T: 72.1 MSEC
BH CV ECHO MEAS - MV V2 VTI: 31 CM
BH CV ECHO MEAS - MVA(P1/2T): 3 CM2
BH CV ECHO MEAS - MVA(VTI): 1.46 CM2
BH CV ECHO MEAS - PA ACC TIME: 0.16 SEC
BH CV ECHO MEAS - PA V2 MAX: 78.7 CM/SEC
BH CV ECHO MEAS - QP/QS: 0.76
BH CV ECHO MEAS - RAP SYSTOLE: 3 MMHG
BH CV ECHO MEAS - RV MAX PG: 1.29 MMHG
BH CV ECHO MEAS - RV V1 MAX: 56.7 CM/SEC
BH CV ECHO MEAS - RV V1 VTI: 11.5 CM
BH CV ECHO MEAS - RVOT DIAM: 1.95 CM
BH CV ECHO MEAS - SV(LVOT): 45.1 ML
BH CV ECHO MEAS - SV(MOD-SP2): 61 ML
BH CV ECHO MEAS - SV(MOD-SP4): 57 ML
BH CV ECHO MEAS - SV(RVOT): 34.3 ML
BH CV ECHO MEAS - SVI(LVOT): 24.1 ML/M2
BH CV ECHO MEAS - SVI(MOD-SP2): 32.6 ML/M2
BH CV ECHO MEAS - SVI(MOD-SP4): 30.4 ML/M2
BH CV ECHO MEAS - TAPSE (>1.6): 2.23 CM
BH CV ECHO MEASUREMENTS AVERAGE E/E' RATIO: 17.63
BH CV XLRA - RV BASE: 3.3 CM
BH CV XLRA - RV LENGTH: 8.5 CM
BH CV XLRA - RV MID: 2.19 CM
BH CV XLRA - TDI S': 12.1 CM/SEC
BILIRUB SERPL-MCNC: 0.5 MG/DL (ref 0–1.2)
BUN SERPL-MCNC: 10 MG/DL (ref 8–23)
BUN/CREAT SERPL: 9.9 (ref 7–25)
CALCIUM SPEC-SCNC: 9.3 MG/DL (ref 8.6–10.5)
CHLORIDE SERPL-SCNC: 106 MMOL/L (ref 98–107)
CO2 SERPL-SCNC: 26.2 MMOL/L (ref 22–29)
CREAT SERPL-MCNC: 1.01 MG/DL (ref 0.76–1.27)
DEPRECATED RDW RBC AUTO: 43.5 FL (ref 37–54)
DIGOXIN SERPL-MCNC: 1.1 NG/ML (ref 0.6–1.2)
EGFRCR SERPLBLD CKD-EPI 2021: 78 ML/MIN/1.73
EOSINOPHIL # BLD AUTO: 0.1 10*3/MM3 (ref 0–0.4)
EOSINOPHIL NFR BLD AUTO: 0.9 % (ref 0.3–6.2)
ERYTHROCYTE [DISTWIDTH] IN BLOOD BY AUTOMATED COUNT: 13 % (ref 12.3–15.4)
GLOBULIN UR ELPH-MCNC: 2.8 GM/DL
GLUCOSE SERPL-MCNC: 126 MG/DL (ref 65–99)
HCT VFR BLD AUTO: 48.6 % (ref 37.5–51)
HGB BLD-MCNC: 16.1 G/DL (ref 13–17.7)
IMM GRANULOCYTES # BLD AUTO: 0.05 10*3/MM3 (ref 0–0.05)
IMM GRANULOCYTES NFR BLD AUTO: 0.5 % (ref 0–0.5)
LEFT ATRIUM VOLUME INDEX: 29 ML/M2
LV EF BIPLANE MOD: 46 %
LYMPHOCYTES # BLD AUTO: 1.84 10*3/MM3 (ref 0.7–3.1)
LYMPHOCYTES NFR BLD AUTO: 17.4 % (ref 19.6–45.3)
MCH RBC QN AUTO: 30 PG (ref 26.6–33)
MCHC RBC AUTO-ENTMCNC: 33.1 G/DL (ref 31.5–35.7)
MCV RBC AUTO: 90.7 FL (ref 79–97)
MONOCYTES # BLD AUTO: 0.91 10*3/MM3 (ref 0.1–0.9)
MONOCYTES NFR BLD AUTO: 8.6 % (ref 5–12)
NEUTROPHILS NFR BLD AUTO: 7.62 10*3/MM3 (ref 1.7–7)
NEUTROPHILS NFR BLD AUTO: 72.1 % (ref 42.7–76)
NRBC BLD AUTO-RTO: 0 /100 WBC (ref 0–0.2)
NT-PROBNP SERPL-MCNC: 849 PG/ML (ref 0–900)
PLATELET # BLD AUTO: 207 10*3/MM3 (ref 140–450)
PMV BLD AUTO: 10.1 FL (ref 6–12)
POTASSIUM SERPL-SCNC: 3.8 MMOL/L (ref 3.5–5.2)
PROT SERPL-MCNC: 6.9 G/DL (ref 6–8.5)
RBC # BLD AUTO: 5.36 10*6/MM3 (ref 4.14–5.8)
SINUS: 3.1 CM
SODIUM SERPL-SCNC: 143 MMOL/L (ref 136–145)
STJ: 2.6 CM
WBC NRBC COR # BLD AUTO: 10.57 10*3/MM3 (ref 3.4–10.8)

## 2025-03-07 PROCEDURE — 80053 COMPREHEN METABOLIC PANEL: CPT | Performed by: NURSE PRACTITIONER

## 2025-03-07 PROCEDURE — 36415 COLL VENOUS BLD VENIPUNCTURE: CPT

## 2025-03-07 PROCEDURE — 80162 ASSAY OF DIGOXIN TOTAL: CPT | Performed by: NURSE PRACTITIONER

## 2025-03-07 PROCEDURE — 93306 TTE W/DOPPLER COMPLETE: CPT

## 2025-03-07 PROCEDURE — 83880 ASSAY OF NATRIURETIC PEPTIDE: CPT | Performed by: NURSE PRACTITIONER

## 2025-03-07 PROCEDURE — 85025 COMPLETE CBC W/AUTO DIFF WBC: CPT | Performed by: NURSE PRACTITIONER

## 2025-03-07 RX ORDER — OMEPRAZOLE 40 MG/1
40 CAPSULE, DELAYED RELEASE ORAL
COMMUNITY
Start: 2025-02-26
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
## 2025-03-10 ENCOUNTER — TRANSCRIBE ORDERS (OUTPATIENT)
Dept: ADMINISTRATIVE | Facility: HOSPITAL | Age: 75
End: 2025-03-10
Payer: MEDICARE

## 2025-03-10 DIAGNOSIS — R91.1 LUNG NODULE: Primary | ICD-10-CM

## 2025-03-12 PROBLEM — I48.21 PERMANENT ATRIAL FIBRILLATION: Status: ACTIVE | Noted: 2024-01-21

## 2025-04-09 RX ORDER — DIGOXIN 125 MCG
125 TABLET ORAL DAILY
Qty: 90 TABLET | Refills: 1 | Status: SHIPPED | OUTPATIENT
Start: 2025-04-09

## 2025-05-27 RX ORDER — CARVEDILOL 6.25 MG/1
6.25 TABLET ORAL 2 TIMES DAILY WITH MEALS
Qty: 180 TABLET | Refills: 0 | Status: SHIPPED | OUTPATIENT
Start: 2025-05-27

## 2025-06-11 ENCOUNTER — TELEPHONE (OUTPATIENT)
Dept: CARDIOLOGY | Age: 75
End: 2025-06-11
Payer: MEDICARE

## 2025-06-11 NOTE — TELEPHONE ENCOUNTER
Received a medical clearance for pt's prostate bx under local [facility] with Dr. Alcon Hair.  They are requesting pre op risk assessment and approval for pt to hold his Xarelto 3 days prior

## 2025-06-12 NOTE — TELEPHONE ENCOUNTER
CARDIOLOGY    Patient Name: Jered Morejon  :1950  Age: 74 y.o.    25    Jered Morejon has been recommended to undergo prostate bx under local [facility] with Dr. Alcon Hair. They are requesting pre op risk assessment and approval for pt to hold his rivaroxaban 3 days prior.    Mr. Morejon has known permanent atrial fibrillation and cardiomyopathy.  He remains on rivaroxaban.    From a cardiovascular standpoint, the patient is at the following risk of major cardiovascular event in the juanita-operative setting:  [] Low         [] Modifiable  [x] Low-Moderate       [] Non-Modifiable   [] Moderate  [] Moderare - high  [] High    Cardiac Testing:  [] Needs further cardiac testing  [x] Does not need further cardiac testing    Anticoagulant Status:  [] No anticoagulants    [] The patient is on [x] Xarelto; hold for __3_ days.      When holding rivaroxaban, he is at potential, nonmodifiable risk of stroke.  I discussed the potential risk with the patient.  If he were to have any strokelike symptoms he would need to present to the closest ED.  He verbalized understanding.    Thank you for allowing me to participate in this patient's care.    Sincerely,         TALHA Jimenez  [facility]   [facility]  [address] [place] [address]  Office: [phone]

## 2025-06-12 NOTE — TELEPHONE ENCOUNTER
Camelia with [facility] called to FU on this.    Her fax is [phone].    Deedee MCKENNA RN  Triage [facility]  06/12/25 13:23 EDT

## 2025-08-11 ENCOUNTER — HOSPITAL ENCOUNTER (OUTPATIENT)
Dept: CT IMAGING | Facility: HOSPITAL | Age: 75
Discharge: HOME | End: 2025-08-11
Admitting: INTERNAL MEDICINE
Payer: MEDICARE

## 2025-08-11 DIAGNOSIS — R91.1 LUNG NODULE: ICD-10-CM

## 2025-08-11 PROCEDURE — 71250 CT THORAX DX C-: CPT

## 2025-08-20 ENCOUNTER — TRANSCRIBE ORDERS (OUTPATIENT)
Dept: ADMINISTRATIVE | Facility: HOSPITAL | Age: 75
End: 2025-08-20
Payer: MEDICARE

## 2025-08-20 DIAGNOSIS — R91.1 LUNG NODULE: Primary | ICD-10-CM

## (undated) DEVICE — CATH DIAG IMPULSE PIG 5F 100CM

## (undated) DEVICE — GLIDESHEATH SLENDER STAINLESS STEEL KIT: Brand: GLIDESHEATH SLENDER

## (undated) DEVICE — CATH DIAG IMPULSE FR5 5F 100CM

## (undated) DEVICE — TR BAND RADIAL ARTERY COMPRESSION DEVICE: Brand: TR BAND

## (undated) DEVICE — RUNTHROUGH NS EXTRA FLOPPY PTCA GUIDEWIRE: Brand: RUNTHROUGH

## (undated) DEVICE — CATH DIAG IMPULSE FL3.5 5F 100CM

## (undated) DEVICE — PK CATH CARD 40

## (undated) DEVICE — KT MANIFLD CARDIAC

## (undated) DEVICE — GLIDESHEATH BASIC HYDROPHILIC COATED INTRODUCER SHEATH: Brand: GLIDESHEATH

## (undated) DEVICE — BALN PRESS WEDGE 5F 110CM

## (undated) DEVICE — DGW .035 FC J3MM 260CM TEF: Brand: EMERALD